# Patient Record
Sex: FEMALE | Race: WHITE | NOT HISPANIC OR LATINO | Employment: FULL TIME
[De-identification: names, ages, dates, MRNs, and addresses within clinical notes are randomized per-mention and may not be internally consistent; named-entity substitution may affect disease eponyms.]

---

## 2018-02-04 ENCOUNTER — HEALTH MAINTENANCE LETTER (OUTPATIENT)
Age: 63
End: 2018-02-04

## 2018-02-05 ENCOUNTER — OFFICE VISIT (OUTPATIENT)
Dept: FAMILY MEDICINE | Facility: CLINIC | Age: 63
End: 2018-02-05
Payer: COMMERCIAL

## 2018-02-05 VITALS
HEART RATE: 82 BPM | WEIGHT: 293 LBS | BODY MASS INDEX: 45.99 KG/M2 | OXYGEN SATURATION: 94 % | HEIGHT: 67 IN | TEMPERATURE: 98.4 F | SYSTOLIC BLOOD PRESSURE: 132 MMHG | DIASTOLIC BLOOD PRESSURE: 80 MMHG

## 2018-02-05 DIAGNOSIS — R01.1 UNDIAGNOSED CARDIAC MURMURS: ICD-10-CM

## 2018-02-05 DIAGNOSIS — Z13.6 CARDIOVASCULAR SCREENING; LDL GOAL LESS THAN 130: ICD-10-CM

## 2018-02-05 DIAGNOSIS — Z11.59 NEED FOR HEPATITIS C SCREENING TEST: ICD-10-CM

## 2018-02-05 DIAGNOSIS — Z23 NEED FOR TDAP VACCINATION: ICD-10-CM

## 2018-02-05 DIAGNOSIS — E66.01 MORBID OBESITY (H): ICD-10-CM

## 2018-02-05 DIAGNOSIS — R60.0 PERIPHERAL EDEMA: Primary | ICD-10-CM

## 2018-02-05 LAB — HBA1C MFR BLD: 5.7 % (ref 4.3–6)

## 2018-02-05 PROCEDURE — 90471 IMMUNIZATION ADMIN: CPT | Performed by: FAMILY MEDICINE

## 2018-02-05 PROCEDURE — 36415 COLL VENOUS BLD VENIPUNCTURE: CPT | Performed by: FAMILY MEDICINE

## 2018-02-05 PROCEDURE — 99203 OFFICE O/P NEW LOW 30 MIN: CPT | Mod: 25 | Performed by: FAMILY MEDICINE

## 2018-02-05 PROCEDURE — 90715 TDAP VACCINE 7 YRS/> IM: CPT | Performed by: FAMILY MEDICINE

## 2018-02-05 PROCEDURE — 80061 LIPID PANEL: CPT | Performed by: FAMILY MEDICINE

## 2018-02-05 PROCEDURE — 82947 ASSAY GLUCOSE BLOOD QUANT: CPT | Performed by: FAMILY MEDICINE

## 2018-02-05 PROCEDURE — 83036 HEMOGLOBIN GLYCOSYLATED A1C: CPT | Performed by: FAMILY MEDICINE

## 2018-02-05 PROCEDURE — 86803 HEPATITIS C AB TEST: CPT | Performed by: FAMILY MEDICINE

## 2018-02-05 NOTE — PROGRESS NOTES
SUBJECTIVE:   Madyson Murray is a 62 year old female who presents to clinic today for the following health issues:    Concern - leg swelling   Onset: 2012 May     Description:   Bilateral leg swelling     Intensity: moderate    Progression of Symptoms:  constant    Accompanying Signs & Symptoms:  Redness     Precipitating factors:   Worsened by: Constant    Alleviating factors:  Improved by: nothing    Therapies Tried and outcome: nothing tried     Bilateral LE edema and erythema- Pt has had bilateral LE edema and erythema since her last total right knee replacement in 2012. She also had total knee replacement in 2010 and one in 2012. She notes her legs are constantly red. Since her last knee surgery, her legs have never been swollen like they currently are. Her bilateral edema is worse during the day, is on her feet all day long. Her swelling improves overnight, is not as severe in the mornings. No chest pain, SOB, recurrent cough. She thinks she had echocardiogram before her knee surgeries, never was told the results. 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 She also notes she has back pain, she throws her back out a lot.    Sleeplessness- Pt is morbidly obese; she works early mornings so she does not sleep all night long, takes naps in the afternoons due to being tired. She normally works from 4am-12pm.        Problem list and histories reviewed & adjusted, as indicated.  Additional history: as documented    Reviewed and updated as needed this visit by clinical staff  Tobacco  Allergies  Meds  Problems  Med Hx  Surg Hx  Fam Hx  Soc Hx        Reviewed and updated as needed this visit by Provider  Allergies  Meds  Problems       ROS:  Constitutional, HEENT, cardiovascular, pulmonary, gi and gu systems are negative, except as otherwise noted.    This document serves as a record of the services and decisions personally performed and made by Ga Ken MD. It  "was created on his behalf by Tyshawn Palacios, a trained medical scribe. The creation of this document is based on the provider's statements to the medical scribe.  Tyshawn Palacios 3:20 PM February 5, 2018    OBJECTIVE:     /80  Pulse 82  Temp 98.4  F (36.9  C) (Oral)  Ht 1.702 m (5' 7\")  Wt 133.8 kg (295 lb)  SpO2 94%  BMI 46.2 kg/m2  Body mass index is 46.2 kg/(m^2).  GENERAL: healthy, alert and no distress  NECK: no adenopathy, no asymmetry, masses, or scars and thyroid normal to palpation  RESP: lungs clear to auscultation - no rales, rhonchi or wheezes  CV: regular rate and rhythm, normal S1 S2, no S3 or S4, no click or rub, peripheral pulses strong. Mild peripheral edema noted bilaterally. Diminished erythema with straight leg raise bilaterally. 2/6 systolic blowing murmur heard best at the right upper sternal border; does not radiate to carotid arteries or axilla.  ABDOMEN: soft, nontender, no hepatosplenomegaly, no masses and bowel sounds normal  SKIN: diffuse dryness, especially in lower extremities    Diagnostic Test Results:  No results found for this or any previous visit (from the past 24 hour(s)).    ASSESSMENT/PLAN:     (R60.9) Peripheral edema  (primary encounter diagnosis)  Comment: Given that she is asymptomatic for CHF, DVT, or pulmonary HTN, discussed with pt that venous insufficiency is likely contributing to her bilateral leg edema. Discussed with pt that obesity is a contributing factor. Furthermore, since her edema is improved in the mornings, this also suggests venous insufficiency is most likely. Discussed with pt complications of uncontrolled venous insufficiency such as lichenification, or venous ulcers; however I reassured her that did not see any signs of either of these things. Recommended frequent use of compression stockings, as well as healthy diet and frequent exercise to promote weight loss which in turn will help improve her venous insufficiency. Lastly, recommended " moisturizing lotions for her skin.  Plan: Follow up if needed.    (E66.01) Morbid obesity (H)  Comment: Given she is morbidly obese, I discussed with pt that this is a risk factor for DAE. Furthermore, I discussed with pt that undiagnosed DAE may be source of her daytime drowsiness; thus, I recommended she see sleep specialist for further evaluation. Discussed with her the importance of getting a good night sleep in regards to having energy during the day which will hopefully motivate her to start exercising and ultimately help her lose weight.  Plan: SLEEP EVALUATION & MANAGEMENT REFERRAL - [facility]         [phone]  (Age 18 and up), Glucose,         Hemoglobin A1c        Follow up if needed.    (R01.1) Undiagnosed cardiac murmurs  Comment: Given her systolic heart murmur heard today upon examination, I strongly recommended we have her  do echocardiogram; she was in agreement with this plan. Will order an echocardiogram in [place] for further evaluation. If echocardiogram does not show any significant findings, can simply continue to monitor her heart murmur.  Plan: Echocardiogram Complete        Follow up based on results.    (Z13.6) CARDIOVASCULAR SCREENING; LDL GOAL LESS THAN 130  Comment: Pt fasting today, will check her lipids today for further evaluation.  Plan: Lipid panel reflex to direct LDL Fasting        Follow up based on labs.    (Z11.59) Need for hepatitis C screening test  Comment: Pt due for Hep C screening, discussed with pt that people born between 5028-3057 are at higher risk of Hep C, pt agreed to screen for Hep C today.  Plan: Hepatitis C Screen Reflex to HCV RNA Quant and         Genotype        Follow up based on labs.    (Z23) Need for Tdap vaccination  Comment: Pt due for TDAP; she agreed to receive one today.  Plan: TDAP VACCINE (ADACEL)        Follow up if needed.    Pt is overdue for physical; she still has her uterus and is due for Pap.  Advised pt that we normally do Pap until 65. Pt was fasting today and agreed to do blood work while she was here. She will schedule an appointment to do her next physical within the next month.    The information in this document, created by the medical scribe for me, accurately reflects the services I personally performed and the decisions made by me. I have reviewed and approved this document for accuracy prior to leaving the patient care area.  February 5, 2018 3:20 PM    Ga Ken Jr, MD  [facility]

## 2018-02-05 NOTE — NURSING NOTE
"Chief Complaint   Patient presents with     Leg Swelling       Initial /80  Pulse 82  Temp 98.4  F (36.9  C) (Oral)  Ht 5' 7\" (1.702 m)  Wt 295 lb (133.8 kg)  SpO2 94%  BMI 46.2 kg/m2 Estimated body mass index is 46.2 kg/(m^2) as calculated from the following:    Height as of this encounter: 5' 7\" (1.702 m).    Weight as of this encounter: 295 lb (133.8 kg).  Medication Reconciliation: complete  "

## 2018-02-05 NOTE — MR AVS SNAPSHOT
After Visit Summary   2/5/2018    Madyson Murray    MRN: [id number]           Patient Information     Date Of Birth          1955        Visit Information        Provider Department      2/5/2018 2:40 PM Ga Ken Jr., MD [facility] [place]        Today's Diagnoses     Peripheral edema    -  1    Morbid obesity (H)        Undiagnosed cardiac murmurs        CARDIOVASCULAR SCREENING; LDL GOAL LESS THAN 130        Need for hepatitis C screening test        Need for Tdap vaccination           Follow-ups after your visit        Additional Services     SLEEP EVALUATION & MANAGEMENT REFERRAL - [facility] [phone]  (Age 18 and up)       Please be aware that coverage of these services is subject to the terms and limitations of your health insurance plan.  Call member services at your health plan with any benefit or coverage questions.      Please bring the following to your appointment:    >>   List of current medications   >>   This referral request   >>   Any documents/labs given to you for this referral                      Follow-up notes from your care team     Return in about 4 weeks (around 3/5/2018) for Preventative Visit.      Future tests that were ordered for you today     Open Future Orders        Priority Expected Expires Ordered    Echocardiogram Complete Routine  2/5/2019 2/5/2018    SLEEP EVALUATION & MANAGEMENT REFERRAL - [facility] [phone]  (Age 18 and up) Routine  2/5/2019 2/5/2018            Who to contact     If you have questions or need follow up information about today's clinic visit or your schedule please contact [facility] [place] directly at [phone].  Normal or non-critical lab and imaging results will be communicated to you by MyChart, letter or phone within 4 business days after the clinic has received the results. If you do not hear from us within 7 days, please contact the clinic  "through [employer] or phone. If you have a critical or abnormal lab result, we will notify you by phone as soon as possible.  Submit refill requests through [employer] or call your pharmacy and they will forward the refill request to us. Please allow 3 business days for your refill to be completed.          Additional Information About Your Visit        [employer] Information     [employer] lets you send messages to your doctor, view your test results, renew your prescriptions, schedule appointments and more. To sign up, go to [email or web address] . Click on \"Log in\" on the left side of the screen, which will take you to the Welcome page. Then click on \"Sign up Now\" on the right side of the page.     You will be asked to enter the access code listed below, as well as some personal information. Please follow the directions to create your username and password.     Your access code is: [email or web address]  Expires: 2018  3:56 PM     Your access code will  in 90 days. If you need help or a new code, please call your [place] clinic or [phone].        Care EveryWhere ID     This is your Care EveryWhere ID. This could be used by other organizations to access your [facility] medical records  [id number]        Your Vitals Were     Pulse Temperature Height Pulse Oximetry BMI (Body Mass Index)       82 98.4  F (36.9  C) (Oral) 5' 7\" (1.702 m) 94% 46.2 kg/m2        Blood Pressure from Last 3 Encounters:   18 132/80    Weight from Last 3 Encounters:   18 295 lb (133.8 kg)              We Performed the Following     Glucose     Hemoglobin A1c     Hepatitis C Screen Reflex to HCV RNA Quant and Genotype     Lipid panel reflex to direct LDL Fasting     TDAP VACCINE (ADACEL)        Primary Care Provider Office Phone # Fax #    Ga Ken Jr., [phone] [address]  [place] [address]        Equal Access to Services     DOMITILA YUN AH: wilda Guzman, " chase longoria barberhalina jeterjules chen ah. So [facility] [phone].    ATENCIÓN: Si habla faustino, tiene a israel disposición servicios gratuitos de asistencia lingüística. Llame al [phone].    We comply with applicable federal civil rights laws and [place] laws. We do not discriminate on the basis of race, color, national origin, age, disability, sex, sexual orientation, or gender identity.            Thank you!     Thank you for choosing [facility] [place]  for your care. Our goal is always to provide you with excellent care. Hearing back from our patients is one way we can continue to improve our services. Please take a few minutes to complete the written survey that you may receive in the mail after your visit with us. Thank you!             Your Updated Medication List - Protect others around you: Learn how to safely use, store and throw away your medicines at www.disposemymeds.org.      Notice  As of 2/5/2018  3:56 PM    You have not been prescribed any medications.

## 2018-02-06 LAB
CHOLEST SERPL-MCNC: 186 MG/DL
GLUCOSE SERPL-MCNC: 84 MG/DL (ref 70–99)
HCV AB SERPL QL IA: NONREACTIVE
HDLC SERPL-MCNC: 62 MG/DL
LDLC SERPL CALC-MCNC: 111 MG/DL
NONHDLC SERPL-MCNC: 124 MG/DL
TRIGL SERPL-MCNC: 66 MG/DL

## 2018-02-06 NOTE — PROGRESS NOTES
Ms. Murray,    -Cholesterol levels (LDL,HDL, Triglycerides) are normal. ADVISE: rechecking in 5 years.  -The 10-year ASCVD risk score (Linnette OPAL Jr, et al., 2013) is: 3.1%.  This is great Madyson.  Normal is less than 5%.    Values used to calculate the score:      Age: 62 years      Sex: Female      Is Non- : No      Diabetic: No      Tobacco smoker: No      Systolic Blood Pressure: 132 mmHg      Is BP treated: No      HDL Cholesterol: 62 mg/dL      Total Cholesterol: 186 mg/dL   -A1C (diabetic test) is normal and indicates that your blood sugar has been in a normal range the last 3 months.  -Glucose (diabetic screening test) is normal.  -Hepatitis C antibody screen test shows no signs of a previous hepatitis C infection.    If you have further questions about the interpretation of your labs, labtestsonline.org is a good website to check out for further information.    Please contact the clinic if you have additional questions.  Thank you.    Sincerely,    Ga Ken MD

## 2018-02-15 ENCOUNTER — OFFICE VISIT (OUTPATIENT)
Dept: SLEEP MEDICINE | Facility: CLINIC | Age: 63
End: 2018-02-15
Payer: COMMERCIAL

## 2018-02-15 VITALS
WEIGHT: 293 LBS | HEIGHT: 67 IN | BODY MASS INDEX: 45.99 KG/M2 | HEART RATE: 71 BPM | SYSTOLIC BLOOD PRESSURE: 140 MMHG | DIASTOLIC BLOOD PRESSURE: 87 MMHG | RESPIRATION RATE: 16 BRPM | OXYGEN SATURATION: 96 %

## 2018-02-15 DIAGNOSIS — G47.30 OBSERVED SLEEP APNEA: ICD-10-CM

## 2018-02-15 DIAGNOSIS — R06.83 SNORING: Primary | ICD-10-CM

## 2018-02-15 DIAGNOSIS — Z82.0 FAMILY HISTORY OF SLEEP APNEA: ICD-10-CM

## 2018-02-15 DIAGNOSIS — G47.26 SHIFT WORK SLEEP DISORDER: ICD-10-CM

## 2018-02-15 DIAGNOSIS — R03.0 ELEVATED BLOOD PRESSURE READING WITHOUT DIAGNOSIS OF HYPERTENSION: ICD-10-CM

## 2018-02-15 PROCEDURE — 99244 OFF/OP CNSLTJ NEW/EST MOD 40: CPT | Performed by: PHYSICIAN ASSISTANT

## 2018-02-15 RX ORDER — ZOLPIDEM TARTRATE 5 MG/1
TABLET ORAL
Qty: 1 TABLET | Refills: 0 | Status: SHIPPED | OUTPATIENT
Start: 2018-02-15 | End: 2018-04-16

## 2018-02-15 NOTE — PROGRESS NOTES
Sleep Consultation:    Date on this visit: 2/15/2018    Madyson Murray is sent by Ga Ken Jr, MD for a sleep consultation regarding morbid obesity.    Primary Physician: Ga Ken Jr.     Madyson Murray reports daytime sleepiness and middle of the night awakenings for over 10 years, worse in the last couple of years. Her medical history is significant for morbid obesity. She also has lower extremity edema and a heart murmur.    Madyson goes to sleep at 7:30-9:00 PM during the week. She is often asleep on the couch before that. She wakes up at 1:30-2:30 AM sometimes before her alarm. She has to get up early enough to take her dog for a walk before work.  She falls asleep in 5 minutes.  Madyson denies difficulty falling asleep.  She wakes up 2-4 times a night for 5-10 minutes before falling back to sleep.  Madyson wakes up to uncertain reasons.  She infrequently gets up for the restroom. On weekends, Madyson goes to sleep at 9:00 PM.  She wakes up at 4:00-5:00 AM without an alarm. She falls asleep in 5 minutes.  Patient gets an average of 5-6 hours of sleep per night.     Patient does watch TV in bed (mostly in the morning) and does not use electronics in bed, and read in bed. She sometimes worries about being able to get to sleep if she knows she has to get up at 1:30 AM.     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  She works early morning shifts.  She works 4 AM to noon. She works 3-11 AM on weekends. She works 6 days per week. She works at [employer]. She has been on this shift for a couple of years. Prior to that, she started at 6 AM. She thinks her sleep was probably a little better then. She is  and lives alone. Both of her siblings have DAE.    Madyson does snore but she does not know how loudly or how frequently. Patient does not have a regular bed partner. There is report of gasping and snorting.  She has had witnessed apneas in the past, when she was observed sleeping. This has been observed by a couple of  friends at different times when they room together. She may have had alcohol on some of those occasions. Patient sleeps on her back and side. She has occasional snort arousals and morning dry mouth, denies morning confusion and restless legs. She has [place] [employer] occasionally and her legs ache a lot. She wakes with a headache 1-2 times per week. It hurts at her temples and lasts a couple of hours. She takes ibuprofen almost every morning. She sometimes gets them in the day too. Madyson has occasional bruxism (no bite guard) and denies any sleep walking, sleep talking, dream enactment, sleep paralysis, cataplexy and hypnogogic/hypnopompic hallucinations.    Madyson has claustrophobia, denies difficulty breathing through her nose, reflux at night and heartburn.      Madyson has gained 25 pounds in 10 years.  Patient describes themself as a morning person.  She would prefer to go to sleep at 9:30 PM and wake up at 6:30 AM.  Patient's [place] Sleepiness score 17/24 consistent with excessive daytime sleepiness.      Madyson naps 6 times per week for 120-180 minutes, feels refreshed after naps. Her naps are usually around 4-6 PM. She sleeps more on the couch than in bed. She takes frequent inadvertant naps.  She admits dozing while driving. It is most likely to occur on longer drives. The most recent episode was 1 month(s) ago.  Patient was counseled on the importance of driving while alert, to pull over if drowsy, or nap before getting into the vehicle if sleepy.  She uses 1-2 cups/day (on days off) of coffee, 1 sodas/day. Last caffeine intake is usually with lunch.    Allergies:    Allergies   Allergen Reactions     Liquid Adhesive        Medications:    Current Outpatient Prescriptions   Medication Sig Dispense Refill     IBUPROFEN PO Take 200 mg by mouth daily 4 tablets daily         Problem List:  Patient Active Problem List    Diagnosis Date Noted     Morbid obesity (H) 02/05/2018     Priority: Medium     CARDIOVASCULAR  SCREENING; LDL GOAL LESS THAN 130 02/05/2018     Priority: Medium        Past Medical/Surgical History:  History reviewed. No pertinent past medical history.  Past Surgical History:   Procedure Laterality Date     ORTHOPEDIC SURGERY      2010 and 2012       Social History:  Social History     Social History     Marital status:      Spouse name: N/A     Number of children: N/A     Years of education: N/A     Occupational History     Not on file.     Social History Main Topics     Smoking status: Former Smoker     Packs/day: 1.00     Years: 15.00     Smokeless tobacco: Never Used      Comment: quit around age 32     Alcohol use Yes      Comment: 2-4 drinks couple times per week     Drug use: No     Sexual activity: Yes     Other Topics Concern     Not on file     Social History Narrative       Family History:  Family History   Problem Relation Age of Onset     Hypertension Mother      DIABETES Father      Hypertension Father      Parkinsonism Father      DIABETES Sister      Hypertension Sister      Sleep Apnea Sister      Sleep Apnea Brother        Review of Systems:  A complete review of systems reviewed by me is negative with the exeption of what has been mentioned in the history of present illness.  CONSTITUTIONAL: NEGATIVE for weight loss, fever, sweats or night sweats, drug allergies.  CONSTITUTIONAL:  POSITIVE for  weight gain and chills  EYES: NEGATIVE for blind spots, double vision.  EYES:  POSITIVE for changes in vision  ENT: NEGATIVE for ear pain, sore throat, sinus pain, bloody nose  ENT:  POSITIVE for  post-nasal drip and runny nose  CARDIAC: NEGATIVE for fast heartbeats or fluttering in chest, chest pain or pressure, breathlessness when lying flat.  CARDIAC:  POSITIVE for  swollen legs and swollen feet  NEUROLOGIC: NEGATIVE numbness in the arms or legs.  NEUROLOGIC:  POSITIVE for  headaches and weakness in the arms or legs  DERMATOLOGIC: NEGATIVE for rashes, new moles or change in  "mole(s)  PULMONARY: NEGATIVE SOB at rest, productive cough, coughing up blood, whistling when breathing.    PULMONARY:  POSITIVE for  SOB with activity, dry cough and wheezing   GASTROINTESTINAL: NEGATIVE for nausea or vomitting, loose or watery stools, fat or grease in stools, constipation, abdominal pain, bowel movements black in color or blood noted.  GENITOURINARY: NEGATIVE for pain during urination, blood in urine, urinating more frequently than usual, irregular menstrual periods.  MUSCULOSKELETAL:  POSITIVE for  muscle pain, bone or joint pain and swollen joints  ENDOCRINE: NEGATIVE for increased thirst or urination, diabetes.  LYMPHATIC: NEGATIVE for swollen lymph nodes, lumps or bumps in the breasts or nipple discharge.    Physical Examination:  Vitals: BP (!) 184/103  Pulse 71  Resp 16  Ht 1.702 m (5' 7\")  Wt 135.2 kg (298 lb)  SpO2 96%  BMI 46.67 kg/m2  Neck Circumference: 41 cm.     [place] Total Score 2/15/2018   Total score - [place] 17       GENERAL APPEARANCE: healthy, alert, no distress and cooperative  EYES: Eyes grossly normal to inspection, PERRL, conjunctivae and sclerae normal and lids and lashes normal  HENT: nose and mouth without ulcers or lesions, oropharynx small and crowded, tongue base enlarged, tonsillar hypertrophy and upper dentures. Several lower teeth are broken  NECK: no adenopathy, no asymmetry, masses, or scars, thyroid normal to palpation and trachea midline and normal to palpation  RESP: lungs clear to auscultation - no rales, rhonchi or wheezes  CV: regular rates and rhythm, normal S1 S2, no S3 or S4, no irregular beats and grade 2/6 systolic murmur heard best over the upper right sternal border  LYMPHATICS: normal ant/post cervical and supraclavicular nodes  MS: extremities normal- no gross deformities noted and pitting 2+ lower extremity edema bilaterally  NEURO: Normal strength and tone, mentation intact, speech normal and cranial nerves 2-12 intact  Mallampati Class: " IV.  Tonsillar Stage: 3  extending beyond pillars.    Impression/Plan:    (R06.83) Snoring  (primary encounter diagnosis), (G47.30) Observed sleep apnea, (Z68.42) BMI 45.0-49.9, adult (H), (R03.0) Elevated blood pressure reading without diagnosis of hypertension, (Z82.0) Family history of sleep apnea  Comment: Madyson has a high risk for sleep apnea. She snores, has observed apnea, daytime sleepiness (ESS 17/24), BMI 46, age >50 (60), neck >40 cm (41 cm). She also had high blood pressure readings today, but is not diagnosed with HTN. Her sleepiness is likely confounded by her irregular work schedule. She has morning headaches 1-2 times per week. She has some risk for hypoventilation given her BMI of 46. Her SpO2 was normal today at 96%. It was 94% at her office visit with her primary. She will be having an ECHO for her murmur next week.  Plan: Comprehensive Sleep Study        Split night PSG with CPAP/BiPAP titration if indicated. 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 She often falls asleep unintentionally after work. She sometimes has trouble getting to sleep on time if she naps close to bedtime.   Plan: We talked about scheduling a nap right when she gets home and setting an alarm to make sure naps are not too long (no more than 2 hours). She was advised to make sure that light is completely blocked from her windows. Consider sunglasses in the evening before bedtime. We will work on this more after her study if needed.      Literature provided regarding sleep apnea.      She will follow up with me in approximately two weeks after her sleep study has been competed to review the results and discuss plan of care.       Polysomnography reviewed.  Obstructive sleep apnea reviewed.  Complications of untreated sleep apnea were reviewed.  45 minutes was spent during this visit, over 50% in counseling and coordination of  care.   Bennett Goltz, PA-C    CC: Ga Ken Jr, MD

## 2018-02-15 NOTE — MR AVS SNAPSHOT
"              After Visit Summary   2/15/2018    Madyson Murray    MRN: [id number]           Patient Information     Date Of Birth          1955        Visit Information        Provider Department      2/15/2018 2:00 PM Goltz, Bennett Ezra, PA-C [facility]        Today's Diagnoses     Snoring    -  1    Observed sleep apnea        BMI 45.0-49.9, adult (H)        Elevated blood pressure reading without diagnosis of hypertension        Family history of sleep apnea        Shift work sleep disorder          Care Instructions    MY TREATMENT INFORMATION FOR SLEEP APNEA-  Madyson Murray    DOCTOR : Bennett Ezra Goltz, PA-C  SLEEP CENTER : [facility]     MY CONTACT NUMBER: [phone]    Am I having a sleep study at a sleep center?  Make sure you have an appointment for the study before you leave!    Am I having a home sleep study?  Watch this video:  https://www.Granicus.com/watch?v=CteI_GhyP9g&[email or web address]    Frequently asked questions:  1. What is Obstructive Sleep Apnea (DAE)? DAE is the most common type of sleep apnea. Apnea means, \"without breath.\"  Apnea is most often caused by narrowing or collapse of the upper airway as muscles relax during sleep.   Almost everyone has occasional apneas. Most people with sleep apnea have had brief interruptions at night frequently for many years.  The severity of sleep apnea is related to how frequent and severe the events are.   2. What are the consequences of DAE? Symptoms include: feeling sleepy during the day, snoring loudly, gasping or stopping of breathing, trouble sleeping, and occasionally morning headaches or heartburn at night.  Sleepiness can be serious and even increase the risk of falling asleep while driving. Other health consequences may include development of high blood pressure and other cardiovascular disease in persons who are susceptible. Untreated DAE can contribute to heart disease, stroke and diabetes.   3. What are " the treatment options? In most situations, sleep apnea is a lifelong disease that must be managed with daily therapy. Medications are not effective for sleep apnea and surgery is generally not considered until other therapies have been tried. Your treatment is your choice . Continuous Positive Airway (CPAP) works right away and is the therapy that is effective in nearly everyone. An oral device to hold your jaw forward is usually the next most reliable option. Other options include postioning devices (to keep you off your back), weight loss, and surgery including a tongue pacing device. There is more detail about some of these options below.    Important tips for using CPAP and similar devices   Know your equipment:  CPAP is continuous positive airway pressure that prevents obstructive sleep apnea by keeping the throat from collapsing while you are sleeping. In most cases, the device is  smart  and can slowly self-adjusts if your throat collapses and keeps a record every day of how well you are treated-this information is available to you and your care team.  BPAP is bilevel positive airway pressure that keeps your throat open and also assists each breath with a pressure boost to maintain adequate breathing.  Special kinds of BPAP are used in patients who have inadequate breathing from lung or heart disease. In most cases, the device is  smart  and can slowly self-adjusts to assist breathing. Like CPAP, the device keeps a record of how well you are treated.  Your mask is your connection to the device. You get to choose what feels most comfortable and the staff will help to make sure if fits. Here: are some examples of the different masks that are available:       Key points to remember on your journey with sleep apnea:  1. Sleep study.  PAP devices often need to be adjusted during a sleep study to show that they are effective and adjusted right.  2. Good tips to remember: Try wearing just the mask during a quiet  time during the day so your body adapts to wearing it. A humidifier is recommended for comfort in most cases to prevent drying of your nose and throat. Allergy medication from your provider may help you if you are having nasal congestion.  3. Getting settled-in. It takes more than one night for most of us to get used to wearing a mask. Try wearing just the mask during a quiet time during the day so your body adapts to wearing it. A humidifier is recommended for comfort in most cases. Our team will work with you carefully on the first day and will be in contact within 4 days and again at 2 and 4 weeks for advice and remote device adjustments. Your therapy is evaluated by the device each day.   4. Use it every night. The more you are able to sleep naturally for 7-8 hours, the more likely you will have good sleep and to prevent health risks or symptoms from sleep apnea. Even if you use it 4 hours it helps. Occasionally all of us are unable to use a medical therapy, in sleep apnea, it is not dangerous to miss one night.   5. Communicate. Call our skilled team on the number provided on the first day if your visit for problems that make it difficult to wear the device. Over 2 out of 3 patients can learn to wear the device long-term with help from our team. Remember to call our team or your sleep providers if you are unable to wear the device as we may have other solutions for those who cannot adapt to mask CPAP therapy. It is recommended that you sleep your sleep provider within the first 3 months and yearly after that if you are not having problems.   Take care of your equipment. Make sure you clean your mask and tubing using directions every day and that your filter and mask are replaced as recommended or if they are not working.     BESIDES CPAP, WHAT OTHER THERAPIES ARE THERE?    Positioning Device  Positioning devices are generally used when sleep apnea is mild and only occurs on your back.This example shows a pillow  that straps around the waist. It may be appropriate for those whose sleep study shows milder sleep apnea that occurs primarily when lying flat on one's back. Preliminary studies have shown benefit but effectiveness at home may need to be verified by a home sleep test. These devices are generally not covered by medical insurance.  Examples of devices that maintain sleeping on the back to prevent snoring and mild sleep apnea.    Belt type body positioner  Http://Prescreen.Ugenie/    Electronic reminder  Http://nightshifttherapy.com/  Http://www.FINXI.Ugenie.au/    Oral Appliance  What is oral appliance therapy?  An oral appliance device fits on your teeth at night like a retainer used after having braces. The device is made by a specialized dentist and requires several visits over 1-2 months before a manufactured device is made to fit your teeth and is adjusted to prevent your sleep apnea. Once an oral device is working properly, snoring should be improved. A home sleep test may be recommended at that time if to determine whether the sleep apnea is adequately treated.       Some things to remember:  -Oral devices are often, but not always, covered by your medical insurance. Be sure to check with your insurance provider.   -If you are referred for oral therapy, you will be given a list of specialized dentists to consider or you may choose to visit the Web site of the American Academy of Dental Sleep Medicine  -Oral devices are less likely to work if you have severe sleep apnea or are extremely overweight.     More detailed information  An oral appliance is a small acrylic device that fits over the upper and lower teeth  (similar to a retainer or a mouth guard). This device slightly moves jaw forward, which moves the base of the tongue forward, opens the airway, improves breathing for effective treat snoring and obstructive sleep apnea in perhaps 7 out of 10 people .  The best working devices are custom-made by a dental  device  after a mold is made of the teeth 1, 2, 3.  When is an oral appliance indicated?  Oral appliance therapy is recommended as a first-line treatment for patients with primary snoring, mild sleep apnea, and for patients with moderate sleep apnea who prefer appliance therapy to use of CPAP4, 5. Severity of sleep apnea is determined by sleep testing and is based on the number of respiratory events per hour of sleep.   How successful is oral appliance therapy?  The success rate of oral appliance therapy in patients with mild sleep apnea is 75-80% while in patients with moderate sleep apnea it is 50-70%. The chance of success in patients with severe sleep apnea is 40-50%. The research also shows that oral appliances have a beneficial effect on the cardiovascular health of DAE patients at the same magnitude as CPAP therapy7.  Oral appliances should be a second-line treatment in cases of severe sleep apnea, but if not completely successful then a combination therapy utilizing CPAP plus oral appliance therapy may be effective. Oral appliances tend to be effective in a broad range of patients although studies show that the patients who have the highest success are females, younger patients, those with milder disease, and less severe obesity. 3, 6.   Finding a dentist that practices dental sleep medicine  Specific training is available through the American Academy of Dental Sleep Medicine for dentists interested in working in the field of sleep. To find a dentist who is educated in the field of sleep and the use of oral appliances, near you, visit the Web site of the American Academy of Dental Sleep Medicine.    References  1. Brooklyn et al. Objectively measured vs self-reported compliance during oral appliance therapy for sleep-disordered breathing. Chest 2013; 144(5): 8835-8990.  2. Alex et al. Objective measurement of compliance during oral appliance therapy for sleep-disordered breathing.  Thorax 2013; 68(1): 91-96.  3. Luke et al. Mandibular advancement devices in 620 men and women with DAE and snoring: tolerability and predictors of treatment success. Chest 2004; 125: 3828-0574.  4. Mckinley et al. Oral appliances for snoring and ADE: a review. Sleep 2006; 29: 244-262.  5. Fatimah et al. Oral appliance treatment for DAE: an update. J Clin Sleep Med 2014; 10(2): 215-227.  6. Bere et al. Predictors of OSAH treatment outcome. J Dent Res 2007; 86: 0970-6915.  Weight Loss:    Weight loss is a long-term strategy that may improve sleep apnea in some patients.    Weight management is a personal decision and the decision should be based on your interest and the potential benefits.  If you are interested in exploring weight loss strategies, the following discussion covers the impact on weight loss on sleep apnea and the approaches that may be successful.    Being overweight does not necessarily mean you will have health consequences.  Those who have BMI over 35 or over 27 with existing medical conditions carries greater risk.   Weight loss decreases severity of sleep apnea in most people with obesity. For those with mild obesity who have developed snoring with weight gain, even 15-30 pound weight loss can improve and occasionally eliminate sleep apnea.  Structured and life-long dietary and health habits are necessary to lose weight and keep healthier weight levels.     Though there may be significant health benefits from weight loss, long-term weight loss is very difficult to achieve- studies show success with dietary management in less than 10% of people. In addition, substantial weight loss may require years of dietary control and may be difficult if patients have severe obesity. In these cases, surgical management may be considered.  Finally, older individuals who have tolerated obesity without health complications may be less likely to benefit from weight loss strategies.      Your BMI is  Body mass index is 46.67 kg/(m^2).  Weight management is a personal decision.  If you are interested in exploring weight loss strategies, the following discussion covers the approaches that may be successful. Body mass index (BMI) is one way to tell whether you are at a healthy weight, overweight, or obese. It measures your weight in relation to your height.  A BMI of 18.5 to 24.9 is in the healthy range. A person with a BMI of 25 to 29.9 is considered overweight, and someone with a BMI of 30 or greater is considered obese. More than two-thirds of American adults are considered overweight or obese.  Being overweight or obese increases the risk for further weight gain. Excess weight may lead to heart disease and diabetes.  Creating and following plans for healthy eating and physical activity may help you improve your health.  Weight control is part of healthy lifestyle and includes exercise, emotional health, and healthy eating habits. Careful eating habits lifelong are the mainstay of weight control. Though there are significant health benefits from weight loss, long-term weight loss with diet alone may be very difficult to achieve- studies show long-term success with dietary management in less than 10% of people. Attaining a healthy weight may be especially difficult to achieve in those with severe obesity. In some cases, medications, devices and surgical management might be considered.  What can you do?  If you are overweight or obese and are interested in methods for weight loss, you should discuss this with your provider.     Consider reducing daily calorie intake by 500 calories.     Keep a food journal.     Avoiding skipping meals, consider cutting portions instead.    Diet combined with exercise helps maintain muscle while optimizing fat loss. Strength training is particularly important for building and maintaining muscle mass. Exercise helps reduce stress, increase energy, and improves fitness. Increasing  exercise without diet control, however, may not burn enough calories to loose weight.       Start walking three days a week 10-20 minutes at a time    Work towards walking thirty minutes five days a week     Eventually, increase the speed of your walking for 1-2 minutes at time    In addition, we recommend that you review healthy lifestyles and methods for weight loss available through the National Institutes of Health patient information sites:  http://win.niddk.nih.gov/publications/index.htm    And look into health and wellness programs that may be available through your health insurance provider, employer, local community center, or ally club.    Weight management plan: Patient was referred to their PCP to discuss a diet and exercise plan.    Surgery:    Surgery for obstructive sleep apnea is considered generally only when other therapies fail to work. Surgery may be discussed with you if you are having a difficult time tolerating CPAP and or when there is an abnormal structure that requires surgical correction.  Nose and throat surgeries often enlarge the airway to prevent collapse.  Most of these surgeries create pain for 1-2 weeks and up to half of the most common surgeries are not effective throughout life.  You should carefully discuss the benefits and drawbacks to surgery with your sleep provider and surgeon to determine if it is the best solution for you.   More information  Surgery for DAE is directed at areas that are responsible for narrowing or complete obstruction of the airway during sleep.  There are a wide range of procedures available to enlarge and/or stabilize the airway to prevent blockage of breathing in the three major areas where it can occur: the palate, tongue, and nasal regions.  Successful surgical treatment depends on the accurate identification of the factors responsible for obstructive sleep apnea in each person.  A personalized approach is required because there is no single  treatment that works well for everyone.  Because of anatomic variation, consultation with an examination by a sleep surgeon is a critical first step in determining what surgical options are best for each patient.  In some cases, examination during sedation may be recommended in order to guide the selection of procedures.  Patients will be counseled about risks and benefits as well as the typical recovery course after surgery. Surgery is typically not a cure for a person s DAE.  However, surgery will often significantly improve one s DAE severity (termed  success rate ).  Even in the absence of a cure, surgery will decrease the cardiovascular risk associated with OSA7; improve overall quality of life8 (sleepiness, functionality, sleep quality, etc).  Palate Procedures:  Patients with DAE often have narrowing of their airway in the region of their tonsils and uvula.  The goals of palate procedures are to widen the airway in this region as well as to help the tissues resist collapse.  Modern palate procedure techniques focus on tissue conservation and soft tissue rearrangement, rather than tissue removal.  Often the uvula is preserved in this procedure. Residual sleep apnea is common in patient after pharyngoplasty with an average reduction in sleep apnea events of 33%2.    Tongue Procedures:  ExamWhile patients are awake, the muscles that surround the throat are active and keep this region open for breathing. These muscles relax during sleep, allowing the tongue and other structures to collapse and block breathing.  There are several different tongue procedures available.  Selection of a tongue base procedure depends on characteristics seen on physical exam.  Generally, procedures are aimed at removing bulky tissues in this area or preventing the back of the tongue from falling back during sleep.  Success rates for tongue surgery range from 50-62%3.  Hypoglossal Nerve Stimulation:  Hypoglossal nerve stimulation has  recently received approval from the [place] Food and Drug Administration for the treatment of obstructive sleep apnea.  This is based on research showing that the system was safe and effective in treating sleep apnea6.  Results showed that the median AHI score decreased 68%, from 29.3 to 9.0. This therapy uses an implant system that senses breathing patterns and delivers mild stimulation to airway muscles, which keeps the airway open during sleep.  The system consists of three fully implanted components: a small generator (similar in size to a pacemaker), a breathing sensor, and a stimulation lead.  Using a small handheld remote, a patient turns the therapy on before bed and off upon awakening.    Candidates for this device must be greater than 22 years of age, have moderate to severe DAE (AHI between 20-65), BMI less than 32, have tried CPAP/oral appliance without success, and have appropriate upper airway anatomy (determined by a sleep endoscopy performed by Dr. Max).  Hypoglossal Nerve Stimulation Pathway:    The sleep surgeon s office will work with the patient through the insurance prior-authorization process (including communications and appeals).    Nasal Procedures:  Nasal obstruction can interfere with nasal breathing during the day and night.  Studies have shown that relief of nasal obstruction can improve the ability of some patients to tolerate positive airway pressure therapy for obstructive sleep apnea1.  Treatment options include medications such as nasal saline, topical corticosteroid and antihistamine sprays, and oral medications such as antihistamines or decongestants. Non-surgical treatments can include external nasal dilators for selected patients. If these are not successful by themselves, surgery can improve the nasal airway either alone or in combination with these other options.  Combination Procedures:  Combination of surgical procedures and other treatments may be recommended,  particularly if patients have more than one area of narrowing or persistent positional disease.  The success rate of combination surgery ranges from 66-80%2,3.    References  1. Wayne CONTRERAS. The Role of the Nose in Snoring and Obstructive Sleep Apnoea: An Update.  Eur Arch Otorhinolaryngol. 2011; 268: 1365-73.  2.  Boubacar SM; Reji JA; Homero JR; Pallanch JF; Clarisa MB; Norma SG; Clive WOOD. Surgical modifications of the upper airway for obstructive sleep apnea in adults: a systematic review and meta-analysis. SLEEP 2010;33(10):5686-9994. Len THOMAS. Hypopharyngeal surgery in obstructive sleep apnea: an evidence-based medicine review.  Arch Otolaryngol Head Neck Surg. 2006 Feb;132(2):206-13.  3. Harvey YH1, Alvin Y, Vinnie ADDY. The efficacy of anatomically based multilevel surgery for obstructive sleep apnea. Otolaryngol Head Neck Surg. 2003 Oct;129(4):327-35.  4. Lne THOMAS, Goldberg A. Hypopharyngeal Surgery in Obstructive Sleep Apnea: An Evidence-Based Medicine Review. Arch Otolaryngol Head Neck Surg. 2006 Feb;132(2):206-13.  5. Vipul PJ et al. Upper-Airway Stimulation for Obstructive Sleep Apnea.  N Engl J Med. 2014 Jan 9;370(2):139-49.  6. April Y et al. Increased Incidence of Cardiovascular Disease in Middle-aged Men with Obstructive Sleep Apnea. Am J Respir Crit Care Med; 2002 166: 159-165  Morris EM et al. Studying Life Effects and Effectiveness of Palatopharyngoplasty (SLEEP) study: Subjective Outcomes of Isolated Uvulopalatopharyngoplasty. Otolaryngol Head Neck Surg. 2011; 144: 623-631.          Follow-ups after your visit        Follow-up notes from your care team     Return in about 2 weeks (around 3/1/2018) for Sleep Study Review.      Your next 10 appointments already scheduled     Feb 22, 2018  3:00 PM CST   Ech Complete with ECECH1   [facility] ([address] [address]   [phone]           1. Please bring or wear a  comfortable two-piece outfit. 2. You may eat, drink and take your normal medicines. 3. For any questions that cannot be answered, please contact the ordering physician            Mar 05, 2018  2:20 PM CST   SHORT with Ga Ken Jr., MD   [facility]    [address] [place] [address] [phone]            Mar 27, 2018  8:30 PM CDT   Psg Split W/Tcm with BED 2 SH SLEEP   [facility] ([facility])    [address]  [facility] [address] [phone]            Apr 16, 2018  3:30 PM CDT   Return Sleep Patient with Bennett Ezra Goltz, PA-C   [facility] ([facility])    [address] [address] [phone]              Future tests that were ordered for you today     Open Future Orders        Priority Expected Expires Ordered    Comprehensive Sleep Study Routine  8/14/2018 2/15/2018            Who to contact     If you have questions or need follow up information about today's clinic visit or your schedule please contact [facility] directly at [phone].  Normal or non-critical lab and imaging results will be communicated to you by Onyvaxhart, letter or phone within 4 business days after the clinic has received the results. If you do not hear from us within 7 days, please contact the clinic through Onyvaxhart or phone. If you have a critical or abnormal lab result, we will notify you by phone as soon as possible.  Submit refill requests through CRAZE or call your pharmacy and they will forward the refill request to us. Please allow 3 business days for your refill to be completed.          Additional Information About Your Visit        CRAZE Information     CRAZE gives you secure access to your electronic health record. If you see a primary care provider, you can also send messages to your care team and make  "appointments. If you have questions, please call your primary care clinic.  If you do not have a primary care provider, please call [phone] and they will assist you.        Care EveryWhere ID     This is your Care EveryWhere ID. This could be used by other organizations to access your [facility] medical records  [id number]        Your Vitals Were     Pulse Respirations Height Pulse Oximetry BMI (Body Mass Index)       71 16 1.702 m (5' 7\") 96% 46.67 kg/m2        Blood Pressure from Last 3 Encounters:   02/15/18 140/87   02/05/18 132/80    Weight from Last 3 Encounters:   02/15/18 135.2 kg (298 lb)   02/05/18 133.8 kg (295 lb)                 Today's Medication Changes          These changes are accurate as of 2/15/18  2:54 PM.  If you have any questions, ask your nurse or doctor.               Start taking these medicines.        Dose/Directions    zolpidem 5 MG tablet   Commonly known as:  AMBIEN   Started by:  Goltz, Bennett Ezra, PA-C        Take tablet by mouth 15 minutes prior to sleep, for Sleep Study   Quantity:  1 tablet   Refills:  0            Where to get your medicines      Some of these will need a paper prescription and others can be bought over the counter.  Ask your nurse if you have questions.     Bring a paper prescription for each of these medications     zolpidem 5 MG tablet                Primary Care Provider Office Phone # Fax #    Ga Ken Jr., [phone] [id number]  [place] [address]        Equal Access to Services     DOMITILA YUN AH: Hadii gil ku hadasho Soomaali, waaxda luqadaha, qaybta kaalmada coronaegyada, chase chen . So [facility] [phone].    ATENCIÓN: Si habla español, tiene a israel disposición servicios gratuitos de asistencia lingüística. Llame al [phone].    We comply with applicable federal civil rights laws and [place] laws. We do not discriminate on the basis of race, color, national origin, age, " disability, sex, sexual orientation, or gender identity.            Thank you!     Thank you for choosing [facility] [place]  for your care. Our goal is always to provide you with excellent care. Hearing back from our patients is one way we can continue to improve our services. Please take a few minutes to complete the written survey that you may receive in the mail after your visit with us. Thank you!             Your Updated Medication List - Protect others around you: Learn how to safely use, store and throw away your medicines at www.disposemymeds.org.          This list is accurate as of 2/15/18  2:54 PM.  Always use your most recent med list.                   Brand Name Dispense Instructions for use Diagnosis    IBUPROFEN PO      Take 200 mg by mouth daily 4 tablets daily        zolpidem 5 MG tablet    AMBIEN    1 tablet    Take tablet by mouth 15 minutes prior to sleep, for Sleep Study

## 2018-02-15 NOTE — PATIENT INSTRUCTIONS
"MY TREATMENT INFORMATION FOR SLEEP APNEA-  Madyson Murray    DOCTOR : Bennett Ezra Goltz, PA-C  SLEEP CENTER : [facility]     MY CONTACT NUMBER: [phone]    Am I having a sleep study at a sleep center?  Make sure you have an appointment for the study before you leave!    Am I having a home sleep study?  Watch this video:  https://www.[employer].com/watch?v=CteI_GhyP9g&list=[email or web address]_[email or web address]    Frequently asked questions:  1. What is Obstructive Sleep Apnea (DAE)? DAE is the most common type of sleep apnea. Apnea means, \"without breath.\"  Apnea is most often caused by narrowing or collapse of the upper airway as muscles relax during sleep.   Almost everyone has occasional apneas. Most people with sleep apnea have had brief interruptions at night frequently for many years.  The severity of sleep apnea is related to how frequent and severe the events are.   2. What are the consequences of DAE? Symptoms include: feeling sleepy during the day, snoring loudly, gasping or stopping of breathing, trouble sleeping, and occasionally morning headaches or heartburn at night.  Sleepiness can be serious and even increase the risk of falling asleep while driving. Other health consequences may include development of high blood pressure and other cardiovascular disease in persons who are susceptible. Untreated DAE can contribute to heart disease, stroke and diabetes.   3. What are the treatment options? In most situations, sleep apnea is a lifelong disease that must be managed with daily therapy. Medications are not effective for sleep apnea and surgery is generally not considered until other therapies have been tried. Your treatment is your choice . Continuous Positive Airway (CPAP) works right away and is the therapy that is effective in nearly everyone. An oral device to hold your jaw forward is usually the next most reliable option. Other options include postioning devices (to keep you off your back), weight loss, and " surgery including a tongue pacing device. There is more detail about some of these options below.    Important tips for using CPAP and similar devices   Know your equipment:  CPAP is continuous positive airway pressure that prevents obstructive sleep apnea by keeping the throat from collapsing while you are sleeping. In most cases, the device is  smart  and can slowly self-adjusts if your throat collapses and keeps a record every day of how well you are treated-this information is available to you and your care team.  BPAP is bilevel positive airway pressure that keeps your throat open and also assists each breath with a pressure boost to maintain adequate breathing.  Special kinds of BPAP are used in patients who have inadequate breathing from lung or heart disease. In most cases, the device is  smart  and can slowly self-adjusts to assist breathing. Like CPAP, the device keeps a record of how well you are treated.  Your mask is your connection to the device. You get to choose what feels most comfortable and the staff will help to make sure if fits. Here: are some examples of the different masks that are available:       Key points to remember on your journey with sleep apnea:  1. Sleep study.  PAP devices often need to be adjusted during a sleep study to show that they are effective and adjusted right.  2. Good tips to remember: Try wearing just the mask during a quiet time during the day so your body adapts to wearing it. A humidifier is recommended for comfort in most cases to prevent drying of your nose and throat. Allergy medication from your provider may help you if you are having nasal congestion.  3. Getting settled-in. It takes more than one night for most of us to get used to wearing a mask. Try wearing just the mask during a quiet time during the day so your body adapts to wearing it. A humidifier is recommended for comfort in most cases. Our team will work with you carefully on the first day and will be  in contact within 4 days and again at 2 and 4 weeks for advice and remote device adjustments. Your therapy is evaluated by the device each day.   4. Use it every night. The more you are able to sleep naturally for 7-8 hours, the more likely you will have good sleep and to prevent health risks or symptoms from sleep apnea. Even if you use it 4 hours it helps. Occasionally all of us are unable to use a medical therapy, in sleep apnea, it is not dangerous to miss one night.   5. Communicate. Call our skilled team on the number provided on the first day if your visit for problems that make it difficult to wear the device. Over 2 out of 3 patients can learn to wear the device long-term with help from our team. Remember to call our team or your sleep providers if you are unable to wear the device as we may have other solutions for those who cannot adapt to mask CPAP therapy. It is recommended that you sleep your sleep provider within the first 3 months and yearly after that if you are not having problems.   Take care of your equipment. Make sure you clean your mask and tubing using directions every day and that your filter and mask are replaced as recommended or if they are not working.     BESIDES CPAP, WHAT OTHER THERAPIES ARE THERE?    Positioning Device  Positioning devices are generally used when sleep apnea is mild and only occurs on your back.This example shows a pillow that straps around the waist. It may be appropriate for those whose sleep study shows milder sleep apnea that occurs primarily when lying flat on one's back. Preliminary studies have shown benefit but effectiveness at home may need to be verified by a home sleep test. These devices are generally not covered by medical insurance.  Examples of devices that maintain sleeping on the back to prevent snoring and mild sleep apnea.    Belt type body positioner  Http://Kodableosa.com/    Electronic reminder  Http://nightshifttherapy.com/   Http://www.PacketFront.Acquis[employer].au/    Oral Appliance  What is oral appliance therapy?  An oral appliance device fits on your teeth at night like a retainer used after having braces. The device is made by a specialized dentist and requires several visits over 1-2 months before a manufactured device is made to fit your teeth and is adjusted to prevent your sleep apnea. Once an oral device is working properly, snoring should be improved. A home sleep test may be recommended at that time if to determine whether the sleep apnea is adequately treated.       Some things to remember:  -Oral devices are often, but not always, covered by your medical insurance. Be sure to check with your insurance provider.   -If you are referred for oral therapy, you will be given a list of specialized dentists to consider or you may choose to visit the Web site of the American Academy of Dental Sleep Medicine  -Oral devices are less likely to work if you have severe sleep apnea or are extremely overweight.     More detailed information  An oral appliance is a small acrylic device that fits over the upper and lower teeth  (similar to a retainer or a mouth guard). This device slightly moves jaw forward, which moves the base of the tongue forward, opens the airway, improves breathing for effective treat snoring and obstructive sleep apnea in perhaps 7 out of 10 people .  The best working devices are custom-made by a dental device  after a mold is made of the teeth 1, 2, 3.  When is an oral appliance indicated?  Oral appliance therapy is recommended as a first-line treatment for patients with primary snoring, mild sleep apnea, and for patients with moderate sleep apnea who prefer appliance therapy to use of CPAP4, 5. Severity of sleep apnea is determined by sleep testing and is based on the number of respiratory events per hour of sleep.   How successful is oral appliance therapy?  The success rate of oral appliance therapy in patients with  mild sleep apnea is 75-80% while in patients with moderate sleep apnea it is 50-70%. The chance of success in patients with severe sleep apnea is 40-50%. The research also shows that oral appliances have a beneficial effect on the cardiovascular health of DAE patients at the same magnitude as CPAP therapy7.  Oral appliances should be a second-line treatment in cases of severe sleep apnea, but if not completely successful then a combination therapy utilizing CPAP plus oral appliance therapy may be effective. Oral appliances tend to be effective in a broad range of patients although studies show that the patients who have the highest success are females, younger patients, those with milder disease, and less severe obesity. 3, 6.   Finding a dentist that practices dental sleep medicine  Specific training is available through the American Academy of Dental Sleep Medicine for dentists interested in working in the field of sleep. To find a dentist who is educated in the field of sleep and the use of oral appliances, near you, visit the Web site of the American Academy of Dental Sleep Medicine.    References  1. Brooklyn et al. Objectively measured vs self-reported compliance during oral appliance therapy for sleep-disordered breathing. Chest 2013; 144(5): 7556-6759.  2. Alex, et al. Objective measurement of compliance during oral appliance therapy for sleep-disordered breathing. Thorax 2013; 68(1): 91-96.  3. Luke et al. Mandibular advancement devices in 620 men and women with DAE and snoring: tolerability and predictors of treatment success. Chest 2004; 125: 9949-3910.  4. Mckinley, et al. Oral appliances for snoring and DAE: a review. Sleep 2006; 29: 244-262.  5. Fatimah et al. Oral appliance treatment for DAE: an update. J Clin Sleep Med 2014; 10(2): 215-227.  6. Bere, et al. Predictors of OSAH treatment outcome. J Dent Res 2007; 86: 8347-6373.  Weight Loss:    Weight loss is a long-term strategy  that may improve sleep apnea in some patients.    Weight management is a personal decision and the decision should be based on your interest and the potential benefits.  If you are interested in exploring weight loss strategies, the following discussion covers the impact on weight loss on sleep apnea and the approaches that may be successful.    Being overweight does not necessarily mean you will have health consequences.  Those who have BMI over 35 or over 27 with existing medical conditions carries greater risk.   Weight loss decreases severity of sleep apnea in most people with obesity. For those with mild obesity who have developed snoring with weight gain, even 15-30 pound weight loss can improve and occasionally eliminate sleep apnea.  Structured and life-long dietary and health habits are necessary to lose weight and keep healthier weight levels.     Though there may be significant health benefits from weight loss, long-term weight loss is very difficult to achieve- studies show success with dietary management in less than 10% of people. In addition, substantial weight loss may require years of dietary control and may be difficult if patients have severe obesity. In these cases, surgical management may be considered.  Finally, older individuals who have tolerated obesity without health complications may be less likely to benefit from weight loss strategies.      Your BMI is Body mass index is 46.67 kg/(m^2).  Weight management is a personal decision.  If you are interested in exploring weight loss strategies, the following discussion covers the approaches that may be successful. Body mass index (BMI) is one way to tell whether you are at a healthy weight, overweight, or obese. It measures your weight in relation to your height.  A BMI of 18.5 to 24.9 is in the healthy range. A person with a BMI of 25 to 29.9 is considered overweight, and someone with a BMI of 30 or greater is considered obese. More than  two-thirds of American adults are considered overweight or obese.  Being overweight or obese increases the risk for further weight gain. Excess weight may lead to heart disease and diabetes.  Creating and following plans for healthy eating and physical activity may help you improve your health.  Weight control is part of healthy lifestyle and includes exercise, emotional health, and healthy eating habits. Careful eating habits lifelong are the mainstay of weight control. Though there are significant health benefits from weight loss, long-term weight loss with diet alone may be very difficult to achieve- studies show long-term success with dietary management in less than 10% of people. Attaining a healthy weight may be especially difficult to achieve in those with severe obesity. In some cases, medications, devices and surgical management might be considered.  What can you do?  If you are overweight or obese and are interested in methods for weight loss, you should discuss this with your provider.     Consider reducing daily calorie intake by 500 calories.     Keep a food journal.     Avoiding skipping meals, consider cutting portions instead.    Diet combined with exercise helps maintain muscle while optimizing fat loss. Strength training is particularly important for building and maintaining muscle mass. Exercise helps reduce stress, increase energy, and improves fitness. Increasing exercise without diet control, however, may not burn enough calories to loose weight.       Start walking three days a week 10-20 minutes at a time    Work towards walking thirty minutes five days a week     Eventually, increase the speed of your walking for 1-2 minutes at time    In addition, we recommend that you review healthy lifestyles and methods for weight loss available through the National Institutes of Health patient information sites:  http://win.niddk.nih.gov/publications/index.htm    And look into health and wellness  programs that may be available through your health insurance provider, employer, local community center, or ally club.    Weight management plan: Patient was referred to their PCP to discuss a diet and exercise plan.    Surgery:    Surgery for obstructive sleep apnea is considered generally only when other therapies fail to work. Surgery may be discussed with you if you are having a difficult time tolerating CPAP and or when there is an abnormal structure that requires surgical correction.  Nose and throat surgeries often enlarge the airway to prevent collapse.  Most of these surgeries create pain for 1-2 weeks and up to half of the most common surgeries are not effective throughout life.  You should carefully discuss the benefits and drawbacks to surgery with your sleep provider and surgeon to determine if it is the best solution for you.   More information  Surgery for DAE is directed at areas that are responsible for narrowing or complete obstruction of the airway during sleep.  There are a wide range of procedures available to enlarge and/or stabilize the airway to prevent blockage of breathing in the three major areas where it can occur: the palate, tongue, and nasal regions.  Successful surgical treatment depends on the accurate identification of the factors responsible for obstructive sleep apnea in each person.  A personalized approach is required because there is no single treatment that works well for everyone.  Because of anatomic variation, consultation with an examination by a sleep surgeon is a critical first step in determining what surgical options are best for each patient.  In some cases, examination during sedation may be recommended in order to guide the selection of procedures.  Patients will be counseled about risks and benefits as well as the typical recovery course after surgery. Surgery is typically not a cure for a person s DAE.  However, surgery will often significantly improve one s DAE  severity (termed  success rate ).  Even in the absence of a cure, surgery will decrease the cardiovascular risk associated with OSA7; improve overall quality of life8 (sleepiness, functionality, sleep quality, etc).  Palate Procedures:  Patients with DAE often have narrowing of their airway in the region of their tonsils and uvula.  The goals of palate procedures are to widen the airway in this region as well as to help the tissues resist collapse.  Modern palate procedure techniques focus on tissue conservation and soft tissue rearrangement, rather than tissue removal.  Often the uvula is preserved in this procedure. Residual sleep apnea is common in patient after pharyngoplasty with an average reduction in sleep apnea events of 33%2.    Tongue Procedures:  ExamWhile patients are awake, the muscles that surround the throat are active and keep this region open for breathing. These muscles relax during sleep, allowing the tongue and other structures to collapse and block breathing.  There are several different tongue procedures available.  Selection of a tongue base procedure depends on characteristics seen on physical exam.  Generally, procedures are aimed at removing bulky tissues in this area or preventing the back of the tongue from falling back during sleep.  Success rates for tongue surgery range from 50-62%3.  Hypoglossal Nerve Stimulation:  Hypoglossal nerve stimulation has recently received approval from the [place] Food and Drug Administration for the treatment of obstructive sleep apnea.  This is based on research showing that the system was safe and effective in treating sleep apnea6.  Results showed that the median AHI score decreased 68%, from 29.3 to 9.0. This therapy uses an implant system that senses breathing patterns and delivers mild stimulation to airway muscles, which keeps the airway open during sleep.  The system consists of three fully implanted components: a small generator (similar in  size to a pacemaker), a breathing sensor, and a stimulation lead.  Using a small handheld remote, a patient turns the therapy on before bed and off upon awakening.    Candidates for this device must be greater than 22 years of age, have moderate to severe DAE (AHI between 20-65), BMI less than 32, have tried CPAP/oral appliance without success, and have appropriate upper airway anatomy (determined by a sleep endoscopy performed by Dr. Max).  Hypoglossal Nerve Stimulation Pathway:    The sleep surgeon s office will work with the patient through the insurance prior-authorization process (including communications and appeals).    Nasal Procedures:  Nasal obstruction can interfere with nasal breathing during the day and night.  Studies have shown that relief of nasal obstruction can improve the ability of some patients to tolerate positive airway pressure therapy for obstructive sleep apnea1.  Treatment options include medications such as nasal saline, topical corticosteroid and antihistamine sprays, and oral medications such as antihistamines or decongestants. Non-surgical treatments can include external nasal dilators for selected patients. If these are not successful by themselves, surgery can improve the nasal airway either alone or in combination with these other options.  Combination Procedures:  Combination of surgical procedures and other treatments may be recommended, particularly if patients have more than one area of narrowing or persistent positional disease.  The success rate of combination surgery ranges from 66-80%2,3.    References  1. Wayne CONTRERAS. The Role of the Nose in Snoring and Obstructive Sleep Apnoea: An Update.  Eur Arch Otorhinolaryngol. 2011; 268: 1365-73.  2.  Boubacar SM; Reji JA; Homero JR; Pallanch JF; Clarisa LIMON; Norma ORTIZ; Clive WOOD. Surgical modifications of the upper airway for obstructive sleep apnea in adults: a systematic review and meta-analysis. SLEEP 2010;33(10):4190-7078.  Len THOMAS. Hypopharyngeal surgery in obstructive sleep apnea: an evidence-based medicine review.  Arch Otolaryngol Head Neck Surg. 2006 Feb;132(2):206-13.  3. Harvey YH1, Alvin Y, Vinnie ADDY. The efficacy of anatomically based multilevel surgery for obstructive sleep apnea. Otolaryngol Head Neck Surg. 2003 Oct;129(4):327-35.  4. Len THOMAS, Goldberg A. Hypopharyngeal Surgery in Obstructive Sleep Apnea: An Evidence-Based Medicine Review. Arch Otolaryngol Head Neck Surg. 2006 Feb;132(2):206-13.  5. Vipul PJ et al. Upper-Airway Stimulation for Obstructive Sleep Apnea.  N Engl J Med. 2014 Jan 9;370(2):139-49.  6. April Y et al. Increased Incidence of Cardiovascular Disease in Middle-aged Men with Obstructive Sleep Apnea. Am J Respir Crit Care Med; 2002 166: 159-165  Alexandra EM et al. Studying Life Effects and Effectiveness of Palatopharyngoplasty (SLEEP) study: Subjective Outcomes of Isolated Uvulopalatopharyngoplasty. Otolaryngol Head Neck Surg. 2011; 144: 623-631.

## 2018-02-15 NOTE — NURSING NOTE
"Chief Complaint   Patient presents with     Sleep Problem     Referred due to having a heart murmur, swelling in legs       Initial BP (!) 184/103  Pulse 71  Resp 16  Ht 1.702 m (5' 7\")  Wt 135.2 kg (298 lb)  SpO2 96%  BMI 46.67 kg/m2 Estimated body mass index is 46.67 kg/(m^2) as calculated from the following:    Height as of this encounter: 1.702 m (5' 7\").    Weight as of this encounter: 135.2 kg (298 lb).  Medication Reconciliation: complete  Neck 41cm   ESS 17  Prema Ba MA    "

## 2018-02-22 ENCOUNTER — RADIANT APPOINTMENT (OUTPATIENT)
Dept: CARDIOLOGY | Facility: CLINIC | Age: 63
End: 2018-02-22
Attending: FAMILY MEDICINE
Payer: COMMERCIAL

## 2018-02-22 DIAGNOSIS — R01.1 UNDIAGNOSED CARDIAC MURMURS: ICD-10-CM

## 2018-02-22 DIAGNOSIS — I27.20 PULMONARY HYPERTENSION (H): Primary | ICD-10-CM

## 2018-02-22 PROCEDURE — 93306 TTE W/DOPPLER COMPLETE: CPT | Performed by: INTERNAL MEDICINE

## 2018-02-22 NOTE — PROGRESS NOTES
Ms. Murray,    Your echocardiogram has demonstrated a few things.  First, it shows a possible explanation of your murmur which may be coming from a leaky mitral valve.  Second, it shows mild elevated pressures in the artery leading from your heart to your lungs.  This is called pulmonary hypertension.      At times, we need to go on a hunt to find out why you have pulmonary hypertension.  For this reason, I'd like you to see one of my cardiology colleagues at our [place] clinic.  I particularly like either Yovani Don or Jaden Forde for a cardiologist.  They may be able to help us figure out why you've developed this as well as things we can do to treat it.  As an aside, I have a suspicion that obstructive sleep apnea may be contributing to it so please follow through and have your sleep study done as treating your suspected obstructive sleep apnea may help with the pulmonary hypertension.    You can schedule with either Dr. Don or Dr. Forde by calling [facility] [phone].   [email or web address]     If you have further questions about the interpretation of your labs, [email or web address] is a good website to check out for further information.    Please contact the clinic if you have additional questions.  Thank you.    Sincerely,    Ga Ken MD

## 2018-02-23 ENCOUNTER — TELEPHONE (OUTPATIENT)
Dept: FAMILY MEDICINE | Facility: CLINIC | Age: 63
End: 2018-02-23

## 2018-02-23 NOTE — TELEPHONE ENCOUNTER
TC called patient to inform next Card appointment here at [facility] is 3/14/18  Patient stated her PCP suggested Dr HENSON and Eula HENSON does not come to  and Eula would have 3/14/18  TC did schedule patient for  3/14/18 (Wed) 2:45 PM 30 min Jaden Forde MD     Patient wants to know if her PCP is ok with this or if she should be seen sooner at the [place] location?  Please call patient back at  [phone]  Taylor ATKINS

## 2018-02-23 NOTE — TELEPHONE ENCOUNTER
Reason for Call:  Other appointment    Detailed comments: pt has referral for Cardiology order from Dr Ken. wants TC to call her and Schedule appt at [facility].    Phone Number Patient can be reached at: Home number on file [phone] (home)    Best Time: any    Can we leave a detailed message on this number? YES    Call taken on 2/23/2018 at 12:23 PM by Jessenia Pino

## 2018-03-05 ENCOUNTER — OFFICE VISIT (OUTPATIENT)
Dept: FAMILY MEDICINE | Facility: CLINIC | Age: 63
End: 2018-03-05
Payer: COMMERCIAL

## 2018-03-05 VITALS
OXYGEN SATURATION: 98 % | WEIGHT: 292 LBS | HEIGHT: 67 IN | BODY MASS INDEX: 45.83 KG/M2 | SYSTOLIC BLOOD PRESSURE: 136 MMHG | HEART RATE: 80 BPM | DIASTOLIC BLOOD PRESSURE: 84 MMHG | TEMPERATURE: 98.5 F

## 2018-03-05 DIAGNOSIS — R82.90 NONSPECIFIC FINDING ON EXAMINATION OF URINE: ICD-10-CM

## 2018-03-05 DIAGNOSIS — Z00.00 ROUTINE GENERAL MEDICAL EXAMINATION AT A HEALTH CARE FACILITY: ICD-10-CM

## 2018-03-05 DIAGNOSIS — Z12.31 VISIT FOR SCREENING MAMMOGRAM: ICD-10-CM

## 2018-03-05 DIAGNOSIS — R26.81 UNSTEADINESS: Primary | ICD-10-CM

## 2018-03-05 DIAGNOSIS — K08.9 POOR DENTITION: ICD-10-CM

## 2018-03-05 DIAGNOSIS — Z12.4 SCREENING FOR MALIGNANT NEOPLASM OF CERVIX: ICD-10-CM

## 2018-03-05 DIAGNOSIS — Z12.11 SCREEN FOR COLON CANCER: ICD-10-CM

## 2018-03-05 LAB
ALBUMIN UR-MCNC: NEGATIVE MG/DL
APPEARANCE UR: CLEAR
BACTERIA #/AREA URNS HPF: ABNORMAL /HPF
BILIRUB UR QL STRIP: NEGATIVE
COLOR UR AUTO: YELLOW
GLUCOSE UR STRIP-MCNC: NEGATIVE MG/DL
HGB UR QL STRIP: ABNORMAL
KETONES UR STRIP-MCNC: NEGATIVE MG/DL
LEUKOCYTE ESTERASE UR QL STRIP: ABNORMAL
MUCOUS THREADS #/AREA URNS LPF: PRESENT /LPF
NITRATE UR QL: NEGATIVE
NON-SQ EPI CELLS #/AREA URNS LPF: ABNORMAL /LPF
PH UR STRIP: 5.5 PH (ref 5–7)
RBC #/AREA URNS AUTO: ABNORMAL /HPF
SOURCE: ABNORMAL
SP GR UR STRIP: 1.02 (ref 1–1.03)
UROBILINOGEN UR STRIP-ACNC: 0.2 EU/DL (ref 0.2–1)
WBC #/AREA URNS AUTO: ABNORMAL /HPF

## 2018-03-05 PROCEDURE — 80053 COMPREHEN METABOLIC PANEL: CPT | Performed by: FAMILY MEDICINE

## 2018-03-05 PROCEDURE — 81001 URINALYSIS AUTO W/SCOPE: CPT | Performed by: FAMILY MEDICINE

## 2018-03-05 PROCEDURE — 99396 PREV VISIT EST AGE 40-64: CPT | Performed by: FAMILY MEDICINE

## 2018-03-05 PROCEDURE — 36415 COLL VENOUS BLD VENIPUNCTURE: CPT | Performed by: FAMILY MEDICINE

## 2018-03-05 PROCEDURE — G0145 SCR C/V CYTO,THINLAYER,RESCR: HCPCS | Performed by: FAMILY MEDICINE

## 2018-03-05 PROCEDURE — 87624 HPV HI-RISK TYP POOLED RSLT: CPT | Performed by: FAMILY MEDICINE

## 2018-03-05 PROCEDURE — 84443 ASSAY THYROID STIM HORMONE: CPT | Performed by: FAMILY MEDICINE

## 2018-03-05 PROCEDURE — 87086 URINE CULTURE/COLONY COUNT: CPT | Performed by: FAMILY MEDICINE

## 2018-03-05 NOTE — PROGRESS NOTES
SUBJECTIVE:   CC: Madyson Murray is an 62 year old woman who presents for preventive health visit.     Healthy Habits:    Do you get at least three servings of calcium containing foods daily (dairy, green leafy vegetables, etc.)? yes    Amount of exercise or daily activities, outside of work: no    Problems taking medications regularly No    Medication side effects: No    Have you had an eye exam in the past two years? yes    Do you see a dentist twice per year? no    Do you have sleep apnea, excessive snoring or daytime drowsiness?yes, pt already has a sleep study scheduled for the end of the month      Pulmonary HTN- Pt had echocardiogram done on 2/22/18 that showed she has pulmonary HTN. She has follow up appointment with Dr. Forde on 3/14/18 to see what we should do to manage this.    Preventative measures- Pt has not had mammogram for a few years, but when she did do them yearly in the past she never had to have a follow up US. She does not have family hx of breast cancer. Pt does not want to do tomogram given she has never had an abnormal mammogram in the past. Pt wants to do mammogram every 2 years. She has never had an abnormal Pap smear. She thinks her last Pap was done in 2010 or 2011. She is due for colon cancer screening. She has elected to do FIT kit today. She does not have an advanced directive. She has not been to the dentist in awhile, but knows it is going to be very expensive when she goes because she knows she has multiple dental problems. She knows it is going to cost much more than $3500 for her dental problems. Pt tries to avoid sun exposure as much as possible.    Balance issues- Pt has noticed her balance has been worsening; she is wondering if this is due to her aging.  She brings this up at the very end of her visit.    Pt has had both of her knees replaced, in 2010 and in 2012.    Pt has sleep study on 3/27/18.        Today's PHQ-2 Score:   PHQ-2 ( 1999 [employer]) 2/5/2018   Q1: Little  interest or pleasure in doing things 0   Q2: Feeling down, depressed or hopeless 0   PHQ-2 Score 0     Abuse: Current or Past(Physical, Sexual or Emotional)- No  Do you feel safe in your environment - Yes    Social History   Substance Use Topics     Smoking status: Former Smoker     Packs/day: 1.00     Years: 15.00     Smokeless tobacco: Never Used      Comment: quit around age 32     Alcohol use Yes      Comment: 2-4 drinks couple times per week     If you drink alcohol do you typically have >3 drinks per day or >7 drinks per week? No                     Reviewed orders with patient.  Reviewed health maintenance and updated orders accordingly - Yes    Patient over age 50, mutual decision to screen reflected in health maintenance.    Pertinent mammograms are reviewed under the imaging tab.  History of abnormal Pap smear: NO - age 30- 65 PAP every 3 years recommended    Reviewed and updated as needed this visit by clinical staff  Tobacco  Allergies  Meds       Reviewed and updated as needed this visit by Provider       ROS:  C: NEGATIVE for fever, chills, change in weight  I: NEGATIVE for worrisome rashes, moles or lesions  E: NEGATIVE for vision changes or irritation  ENT: NEGATIVE for ear, mouth and throat problems  R: NEGATIVE for significant cough or SOB  B: NEGATIVE for masses, tenderness or discharge  CV: NEGATIVE for chest pain, palpitations or peripheral edema  GI: NEGATIVE for nausea, abdominal pain, heartburn, or change in bowel habits  : NEGATIVE for unusual urinary or vaginal symptoms. No vaginal bleeding.  M: NEGATIVE for significant arthralgias or myalgia  N: NEGATIVE for weakness, dizziness or paresthesias. POSITIVE for unsteadiness.  P: NEGATIVE for changes in mood or affect     This document serves as a record of the services and decisions personally performed and made by Ga Ken MD. It was created on his behalf by Tyshawn Palacios, a trained medical scribe. The creation of this  "document is based on the provider's statements to the medical scribe.  Tyshawn Palacios 2:58 PM March 5, 2018    OBJECTIVE:   /84 (BP Location: Right arm, Patient Position: Sitting, Cuff Size: Adult Large)  Pulse 80  Temp 98.5  F (36.9  C) (Oral)  Ht 1.702 m (5' 7\")  Wt 132.5 kg (292 lb)  SpO2 98%  BMI 45.73 kg/m2  EXAM:  Patient was offered chaperone for her breast/pelvic exam today.  She was indifferent to this resource.    GENERAL APPEARANCE: healthy, alert and no distress  EYES: Eyes grossly normal to inspection, PERRL and conjunctivae and sclerae normal  HENT: ear canals and TM's normal, nose and mouth without ulcers or lesions, oropharynx clear and oral mucous membranes moist.  Very poor dentition.  NECK: no adenopathy, no asymmetry, masses, or scars and thyroid normal to palpation  RESP: lungs clear to auscultation - no rales, rhonchi or wheezes  BREAST: normal without masses, tenderness or nipple discharge and no palpable axillary masses or adenopathy  CV: regular rate and rhythm, normal S1 S2, no S3 or S4, no murmur, click or rub, no peripheral edema and peripheral pulses strong  ABDOMEN: soft, nontender, no hepatosplenomegaly, no masses and bowel sounds normal   (female): normal female external genitalia, normal urethral meatus, vaginal mucosal atrophy noted, normal cervix, adnexae, and uterus without masses or abnormal discharge  MS: no musculoskeletal defects are noted and gait is age appropriate without ataxia  SKIN: no suspicious lesions or rashes  NEURO: Normal strength and tone, sensory exam grossly normal, mentation intact and speech normal  PSYCH: mentation appears normal and affect normal/bright    Results for orders placed or performed in visit on 03/05/18 (from the past 24 hour(s))   *UA reflex to Microscopic   Result Value Ref Range    Color Urine Yellow     Appearance Urine Clear     Glucose Urine Negative NEG^Negative mg/dL    Bilirubin Urine Negative NEG^Negative    Ketones Urine " Negative NEG^Negative mg/dL    Specific Gravity Urine 1.025 1.003 - 1.035    Blood Urine Trace (A) NEG^Negative    pH Urine 5.5 5.0 - 7.0 pH    Protein Albumin Urine Negative NEG^Negative mg/dL    Urobilinogen Urine 0.2 0.2 - 1.0 EU/dL    Nitrite Urine Negative NEG^Negative    Leukocyte Esterase Urine Moderate (A) NEG^Negative    Source Midstream Urine    Urine Microscopic   Result Value Ref Range    WBC Urine 0 - 5 OTO5^0 - 5 /HPF    RBC Urine O - 2 OTO2^O - 2 /HPF    Squamous Epithelial /LPF Urine Moderate (A) FEW^Few /LPF    Bacteria Urine Many (A) NEG^Negative /HPF    Mucous Urine Present (A) NEG^Negative /LPF       ASSESSMENT/PLAN:     (R26.81) Unsteadiness  (primary encounter diagnosis)  Comment: Discussed with pt that we can check her electrolytes, thyroid and UA today for further evaluation; she was in agreement with this. Furthermore, offered to have pt see OT to evaluate her balance issues. Pt declined this resource at this time, but will continue to monitor her balance issue and will follow up if it worsens or persists.  Plan: Comprehensive metabolic panel (BMP + Alb, Alk         Phos, ALT, AST, Total. Bili, TP), TSH with free        T4 reflex, *UA reflex to Microscopic        Follow up based on labs.    (Z00.00) Routine general medical examination at a health care facility  Comment: Recent echocardiogram showed pt has pulmonary HTN; she has consult appointment with Dr. Forde on 3/14/18. Discussed with pt that Dr. Forde in [facility] may give us medication options to manage her pulmonary HTN, or may recommend further cardiac testing to hopefully find the underlying cause to her pulmonary hypertension. Lastly, pt has sleep study on 3/27/18.  Plan: Will have her follow up in 1 year for her next preventative visit.    (Z12.11) Screen for colon cancer  Comment: Pt due for colon cancer screening, pt has elected to do FIT kit today, can continue to do yearly FIT as long as they are consistently negative, will  recommend colonoscopy with positive FIT. Discussed with pt that we can stop doing colon cancer screening at age 75.  Plan: Fecal colorectal cancer screen (FIT)        Follow up based on labs.    (Z12.31) Visit for screening mammogram  Comment: Pt has never had an abnormal mammogram and does not have a family hx of breast cancer. Discussed with pt having her do a 3D mammogram; it can detect things that normal mammogram cannot, but is more expensive. Furthermore, insurance companies leave it up to patients to do mammogram every year or every other year. Pt declined to do 3D mammogram and wants to do mammogram every 2 years. Discussed with her that if her mammogram is abnormal, I will be able to see these results and will have her follow up with radiology to do a follow up US.  Plan: MA SCREENING DIGITAL BILAT - Future  (s+30)        Follow up based on results.    (Z12.4) Screening for malignant neoplasm of cervix  Comment: Pt last had Pap in 2010 or 2011 but has never had an abnormal Pap. Discussed with pt that HPV is the most common STD. Vast majority of women that contract this infection do not have residual effects from the infection after it resolves. However, some women have residual inflammation. Discussed with her that given she has never had an abnormal Pap smear, if her Pap smear is normal today with negative HPV testing, this will be the last Pap smear she will need to do.  Plan: Pap imaged thin layer screen with HPV -         recommended age 30 - 65 years (select HPV order        below)        Follow up based on labs.    (K08.9) Poor dentition  Comment: Pt has financial constraints that limit her dental care. Offered to have pt see our care coordinator Jericho Gallego to discuss financial options to help her afford dental care; pt agreed to talk to her. Advised pt that Jericho will reach out to her.  Plan: CARE COORDINATION REFERRAL        Follow up if needed.    COUNSELING:   Reviewed preventive health  "counseling, as reflected in patient instructions       Regular exercise       Healthy diet/nutrition       Immunizations    UTD          Safe sex practices/STD prevention       Colon cancer screening     reports that she has quit smoking. She has a 15.00 pack-year smoking history. She has never used smokeless tobacco.    Estimated body mass index is 45.73 kg/(m^2) as calculated from the following:    Height as of this encounter: 1.702 m (5' 7\").    Weight as of this encounter: 132.5 kg (292 lb).   Weight management plan: Discussed healthy diet and exercise guidelines and patient will follow up in 12 months in clinic to re-evaluate.    Counseling Resources:  ATP IV Guidelines  Pooled Cohorts Equation Calculator  Breast Cancer Risk Calculator  FRAX Risk Assessment  ICSI Preventive Guidelines  Dietary Guidelines for Americans, 2010  USDA's MyPlate  ASA Prophylaxis  Lung CA Screening    The information in this document, created by the medical scribe for me, accurately reflects the services I personally performed and the decisions made by me. I have reviewed and approved this document for accuracy prior to leaving the patient care area.  March 5, 2018 2:58 PM    Ga Ken Jr, MD  [facility]  "

## 2018-03-05 NOTE — NURSING NOTE
"Chief Complaint   Patient presents with     Physical       Initial /84 (BP Location: Right arm, Patient Position: Sitting, Cuff Size: Adult Large)  Pulse 80  Temp 98.5  F (36.9  C) (Oral)  Ht 5' 7\" (1.702 m)  Wt 292 lb (132.5 kg)  SpO2 98%  BMI 45.73 kg/m2 Estimated body mass index is 45.73 kg/(m^2) as calculated from the following:    Height as of this encounter: 5' 7\" (1.702 m).    Weight as of this encounter: 292 lb (132.5 kg).  Medication Reconciliation: complete   Lidia Dixon MA    "

## 2018-03-05 NOTE — MR AVS SNAPSHOT
After Visit Summary   3/5/2018    Madyson Murray    MRN: [id number]           Patient Information     Date Of Birth          1955        Visit Information        Provider Department      3/5/2018 2:20 PM Ga Ken Jr., MD [facility]        Today's Diagnoses     Routine general medical examination at a health care facility        Screen for colon cancer        Visit for screening mammogram        Screening for malignant neoplasm of cervix          Care Instructions      Preventive Health Recommendations  Female Ages 50 - 64    Yearly exam: See your health care provider every year in order to  o Review health changes.   o Discuss preventive care.    o Review your medicines if your doctor has prescribed any.      Get a Pap test every three years (unless you have an abnormal result and your provider advises testing more often).    If you get Pap tests with HPV test, you only need to test every 5 years, unless you have an abnormal result.     You do not need a Pap test if your uterus was removed (hysterectomy) and you have not had cancer.    You should be tested each year for STDs (sexually transmitted diseases) if you're at risk.     Have a mammogram every 1 to 2 years.    Have a colonoscopy at age 50, or have a yearly FIT test (stool test). These exams screen for colon cancer.      Have a cholesterol test every 5 years, or more often if advised.    Have a diabetes test (fasting glucose) every three years. If you are at risk for diabetes, you should have this test more often.     If you are at risk for osteoporosis (brittle bone disease), think about having a bone density scan (DEXA).    Shots: Get a flu shot each year. Get a tetanus shot every 10 years.    Nutrition:     Eat at least 5 servings of fruits and vegetables each day.    Eat whole-grain bread, whole-wheat pasta and brown rice instead of white grains and rice.    Talk to your provider about Calcium and Vitamin D.      Lifestyle    Exercise at least 150 minutes a week (30 minutes a day, 5 days a week). This will help you control your weight and prevent disease.    Limit alcohol to one drink per day.    No smoking.     Wear sunscreen to prevent skin cancer.     See your dentist every six months for an exam and cleaning.    See your eye doctor every 1 to 2 years.            Follow-ups after your visit        Follow-up notes from your care team     Return in about 1 year (around 3/5/2019) for Preventative Visit.      Your next 10 appointments already scheduled     Mar 14, 2018  2:45 PM CDT   New Visit with Jaden Forde MD   [facility] ([facility])    [address] [address]   [phone]            Mar 27, 2018  8:30 PM CDT   Psg Split W/Tcm with BED 2 SH SLEEP   [facility] ([facility])    [address] [address]   [phone]            Apr 16, 2018  3:30 PM CDT   Return Sleep Patient with Bennett Ezra Goltz, PA-C   [facility] ([facility])    [address]  [facility] [address]   [phone]              Future tests that were ordered for you today     Open Future Orders        Priority Expected Expires Ordered    MA SCREENING DIGITAL BILAT - Future  (s+30) Routine  3/5/2019 3/5/2018    Fecal colorectal cancer screen (FIT) Routine 3/26/2018 5/28/2018 3/5/2018            Who to contact     If you have questions or need follow up information about today's clinic visit or your schedule please contact [facility] [place] directly at [phone].  Normal or non-critical lab and imaging results will be communicated to you by MyChart, letter or phone within 4 business days after the clinic has received the results. If you do not hear from us within 7 days, please contact the clinic through [employer] or  "phone. If you have a critical or abnormal lab result, we will notify you by phone as soon as possible.  Submit refill requests through [employer] or call your pharmacy and they will forward the refill request to us. Please allow 3 business days for your refill to be completed.          Additional Information About Your Visit        [employer] Information     [employer] gives you secure access to your electronic health record. If you see a primary care provider, you can also send messages to your care team and make appointments. If you have questions, please call your primary care clinic.  If you do not have a primary care provider, please call [phone] and they will assist you.        Care EveryWhere ID     This is your Care EveryWhere ID. This could be used by other organizations to access your [place] medical records  [id number]        Your Vitals Were     Pulse Temperature Height Pulse Oximetry BMI (Body Mass Index)       80 98.5  F (36.9  C) (Oral) 5' 7\" (1.702 m) 98% 45.73 kg/m2        Blood Pressure from Last 3 Encounters:   03/05/18 136/84   02/15/18 140/87   02/05/18 132/80    Weight from Last 3 Encounters:   03/05/18 292 lb (132.5 kg)   02/15/18 298 lb (135.2 kg)   02/05/18 295 lb (133.8 kg)              We Performed the Following     Pap imaged thin layer screen with HPV - recommended age 30 - 65 years (select HPV order below)        Primary Care Provider Office Phone # Fax #    Ga Ken Jr., [phone] [address]  [place] [address]        Equal Access to Services     [facility]: Hadii aad ku hadasho Soomaali, waaxda luqadaha, qaybta kaalmada adeegcaro, chase chen . So [facility] [phone].    ATENCIÓN: Si habla español, tiene a israel disposición servicios gratuitos de asistencia lingüística. Llame al [phone].    We comply with applicable federal civil rights laws and [place] laws. We do not discriminate on the basis of race, color, " national origin, age, disability, sex, sexual orientation, or gender identity.            Thank you!     Thank you for choosing [facility]  for your care. Our goal is always to provide you with excellent care. Hearing back from our patients is one way we can continue to improve our services. Please take a few minutes to complete the written survey that you may receive in the mail after your visit with us. Thank you!             Your Updated Medication List - Protect others around you: Learn how to safely use, store and throw away your medicines at www.disposemymeds.org.          This list is accurate as of 3/5/18  3:21 PM.  Always use your most recent med list.                   Brand Name Dispense Instructions for use Diagnosis    IBUPROFEN PO      Take 200 mg by mouth daily 4 tablets daily        zolpidem 5 MG tablet    AMBIEN    1 tablet    Take tablet by mouth 15 minutes prior to sleep, for Sleep Study

## 2018-03-06 ENCOUNTER — CARE COORDINATION (OUTPATIENT)
Dept: CARE COORDINATION | Facility: CLINIC | Age: 63
End: 2018-03-06

## 2018-03-06 LAB
ALBUMIN SERPL-MCNC: 3.9 G/DL (ref 3.4–5)
ALP SERPL-CCNC: 88 U/L (ref 40–150)
ALT SERPL W P-5'-P-CCNC: 25 U/L (ref 0–50)
ANION GAP SERPL CALCULATED.3IONS-SCNC: 6 MMOL/L (ref 3–14)
AST SERPL W P-5'-P-CCNC: 26 U/L (ref 0–45)
BACTERIA SPEC CULT: NORMAL
BACTERIA SPEC CULT: NORMAL
BILIRUB SERPL-MCNC: 1.2 MG/DL (ref 0.2–1.3)
BUN SERPL-MCNC: 18 MG/DL (ref 7–30)
CALCIUM SERPL-MCNC: 9 MG/DL (ref 8.5–10.1)
CHLORIDE SERPL-SCNC: 104 MMOL/L (ref 94–109)
CO2 SERPL-SCNC: 28 MMOL/L (ref 20–32)
CREAT SERPL-MCNC: 0.85 MG/DL (ref 0.52–1.04)
GFR SERPL CREATININE-BSD FRML MDRD: 68 ML/MIN/1.7M2
GLUCOSE SERPL-MCNC: 81 MG/DL (ref 70–99)
POTASSIUM SERPL-SCNC: 4 MMOL/L (ref 3.4–5.3)
PROT SERPL-MCNC: 7.6 G/DL (ref 6.8–8.8)
SODIUM SERPL-SCNC: 138 MMOL/L (ref 133–144)
SPECIMEN SOURCE: NORMAL
TSH SERPL DL<=0.005 MIU/L-ACNC: 2.38 MU/L (ref 0.4–4)

## 2018-03-06 NOTE — PROGRESS NOTES
Clinic Care Coordination Contact    Referral source:  PCP -    CARE COORDINATION REFERRAL [[id number]]   Electronically signed by: Ga Ken Jr., MD on 03/05/18 1524 Status: Completed   Ordering user: Ga Ken Jr., MD 03/05/18 1524     Released by: Alexandria Horvath CMA 03/05/18 1524     Order History   Outpatient   Date/Time Action Taken User Additional Information   03/05/18 1521 Pend Ga Ken Jr., MD    03/05/18 1524 Sign Ga Ken Jr., MD    03/05/18 1524 Complete Ga Ken Jr., MD    Comments   Services are provided by a Care Coordinator for people with complex needs such as: medical, social, or financial troubles.  The Care Coordinator works with the patient and their Primary Care Provider to determine health goals, obtain resources, achieve outcomes, and develop care plans that help coordinate the patient's care.     Reason for Referral: Financial Support: dental work needed and concern about cost of this    Additional pertinent details:      Clinical Staff have discussed the Care Coordination Referral with the patient and/or caregiver: yes   Associated Diagnoses   Poor dentition [K08.9]           Per Dr. Ken's 03/05/2018 OV notes-   She has not been to the dentist in awhile, but knows it is going to be very expensive when she goes because she knows she has multiple dental problems. She knows it is going to cost much more than $3500 for her dental problems.    (K08.9) Poor dentition  Comment: Pt has financial constraints that limit her dental care. Offered to have pt see our care coordinator Jericho Gallego to discuss financial options to help her afford dental care; pt agreed to talk to her. Advised pt that Jericho will reach out to her.  Plan: CARE COORDINATION REFERRAL        Follow up if  needed.  -------------------------------------------------------------------------------------------------------------------------------------------------------------------------------------------------------------------------------------------------------------------------------------------------------------------------------------------------------------------------      Patient Active Problem List    Diagnosis Date Noted     Morbid obesity (H) 02/05/2018     Priority: Medium     CARDIOVASCULAR SCREENING; LDL GOAL LESS THAN 130 02/05/2018     Priority: Medium       UTC/Voicemail  Clinical Data: Care Coordinator Outreach  Outreach attempted x 1.  Left message on voicemail with call back information and requested return call.  Plan: Care Coordinator will try to reach patient again in 1-2 business days.    Aurora Steward, REJI  [facility] Care Coordinator - [place] and [place] Locations   Direct:  [phone] (voicemail available)

## 2018-03-06 NOTE — LETTER
"[facility] CARE COORDINATION        March 16, 2018    Madyson Murray  [address]    [facility] [address]      Dear Madyson,    I am a clinic care coordinator who works with Ga Ken Jr, MD at [facility] I have been trying to reach you recently to introduce Clinic Care Coordination and to see if there was anything I could assist you with.  I wanted to introduce myself and provide you with my contact information so that you can call me with questions or concerns about your health care. Below is a description of clinic care coordination and how I can further assist you.     The clinic care coordinator is a registered nurse and/or  who understand the health care system. The goal of clinic care coordination is to help you manage your health and improve access to the [facility] in the most efficient manner. The registered nurse can assist you in meeting your health care goals by providing education, coordinating services, and strengthening the communication among your providers. The  can assist you with financial, behavioral, psychosocial, chemical dependency, counseling, and/or psychiatric resources.    We at [facility] are focused on providing you with the highest-quality healthcare experience possible and that all starts with you.     Sincerely,     Yelena \"Jericho\" Latricia, RN -  [phone]  Aurora Steward, RN - [phone]                        Enclosed:   I have enclosed a copy of a 24 Hour Access Plan. This has helpful phone numbers for you to call when needed. Please keep this in an easy to access place to use as needed.   I have enclosed helpful educational material/resources. Please review and call me with any questions.                                          [facility]  This is a list of dental clinics in or around the [place] area that have indicated they provide dental services to patients who are on public care programs, or that offer " dental services at a reduced cost. Find a clinic in your area, and then call to make an appointment. Please note that services, hours and costs at these clinics vary, so you may find it helpful to visit the clinic website for more information.  Please note that this is not a comprehensive list of all [facility] dental clinics that provide such services, nor are these clinics expressly endorsed by the [facility]. In addition, many private practice dentists who are not listed here provide care to those who are enrolled in [place] Health Care Programs (Medical Assistance, [facility], and General Assistance Medical Care).  Also, there are special resources where limited amounts of reduced cost or free dental services are offered under certain circumstances.      [facility] (Dental cleaning,  deep cleaning - periodontal therapy  and x-rays)  [address] [place] [address]  [phone]    [facility]  [address] [address] [address] [phone]    Dentures ASAP  Dr. Unruly Bliss  [facility]  [address] [place], [place] [address] [phone]    [facility]  [address] [place], [place] [address] [phone]    [facility] (Dental cleaning,  deep cleaning- periodontal therapy  and x-rays)  [address] [place], [place] [address] [phone]        [facility] (Sliding fee scale dental services and some state medical assistance programs accepted)  [address] [place] [id number] [phone]    [facility] (Dr. Leon Charlton - specific criteria and medical necessity required)  [facility] Professional Building, 2nd Floor, [address]  [address] [place], [place] [address] [phone]    [facility]  [address] [place] [address] [place]  [phone] (main  clinic)  After Hours: Adult emergencies [phone] Pediatric emergencies [phone]    [facility]  [address] [place] [address]  [phone]    [facility] (Dental cleaning,  deep cleaning- periodontal therapy  and x-rays).  [address] [place] [address]  [phone]

## 2018-03-06 NOTE — LETTER
Health Care Home - Access Care Plan    About Me  Patient Name:  Madyson Murray    YOB: 1955  Age:                             62 year old   [place] MRN:            [id number] Telephone Information:     Home Phone [phone]   Mobile [phone]       Address:    [address] [place] [address] Email address:  [email or web address]      Emergency Contact(s)  Name Relationship Lgl Grd Work Phone Home Phone Mobile Phone   BERNADETTE DE SANTIAGO Brother   [phone]              Health Maintenance: Routine Health maintenance Reviewed: Not assessed    My Access Plan  Medical Emergency 911   Questions or concerns during clinic hours Primary Clinic Line, I will call the clinic directly: [facility] [phone]   24 Hour Appointment Line [phone] or  [phone]) (toll free)   24 Hour Nurse Line [phone] (toll free)   Questions or concerns outside clinic hours 24 Hour Appointment Line, I will call the after-hours on-call line:   [facility] [phone] or [phone]) (toll-free)   Preferred Urgent Care  (Unknown )   Preferred Hospital  (Unknown )   Preferred Pharmacy [id number] - [place], [place] - [address] Behavioral Health Crisis Line The National Suicide Prevention Lifeline at 1-274.164.3572 or 911     My Care Team Members  Patient Care Team       Relationship Specialty Notifications Start End    Ga Ken Jr., MD PCP - General Family Practice  1/29/18     Phone: [phone] Fax: [phone] [address] [place] [address]        My Medical and Care Information  Problem List   Patient Active Problem List   Diagnosis     Morbid obesity (H)     CARDIOVASCULAR SCREENING; LDL GOAL LESS THAN 130     Pulmonary hypertension     Non-rheumatic tricuspid valve insufficiency     Obstructive sleep apnea syndrome     Venous (peripheral) insufficiency      Current Medications and Allergies:  See printed  Medication Report

## 2018-03-07 NOTE — PROGRESS NOTES
Ms. Murray,    -Urine culture is normal.  There is no need for antibiotics at this point.  If new, worsening or persistent symptoms, then you should call or return for a recheck.    If you have further questions about the interpretation of your labs, [email or web address] is a good website to check out for further information.    Please contact the clinic if you have additional questions.  Thank you.    Sincerely,    Ga Ken MD

## 2018-03-08 LAB
COPATH REPORT: NORMAL
PAP: NORMAL

## 2018-03-08 NOTE — PROGRESS NOTES
Clinic Care Coordination Contact  RUST/Voicemail    Referral Source: PCP  Clinical Data: Care Coordinator Outreach  Outreach attempted x 2.  Left message on voicemail with call back information and requested return call.  Plan:  Care Coordinator will try to reach patient again in 3-5 business days.    Aurora Steward, RN  [facility] Care Coordinator - [place] and [place] Locations   Direct:  [phone] (voicemail available)

## 2018-03-09 LAB
FINAL DIAGNOSIS: NORMAL
HPV HR 12 DNA CVX QL NAA+PROBE: NEGATIVE
HPV16 DNA SPEC QL NAA+PROBE: NEGATIVE
HPV18 DNA SPEC QL NAA+PROBE: NEGATIVE
SPECIMEN DESCRIPTION: NORMAL
SPECIMEN SOURCE CVX/VAG CYTO: NORMAL

## 2018-03-13 PROCEDURE — 82274 ASSAY TEST FOR BLOOD FECAL: CPT | Performed by: FAMILY MEDICINE

## 2018-03-14 ENCOUNTER — OFFICE VISIT (OUTPATIENT)
Dept: CARDIOLOGY | Facility: CLINIC | Age: 63
End: 2018-03-14
Payer: COMMERCIAL

## 2018-03-14 VITALS
DIASTOLIC BLOOD PRESSURE: 75 MMHG | HEIGHT: 67 IN | OXYGEN SATURATION: 98 % | WEIGHT: 292 LBS | SYSTOLIC BLOOD PRESSURE: 144 MMHG | HEART RATE: 82 BPM | BODY MASS INDEX: 45.83 KG/M2

## 2018-03-14 DIAGNOSIS — G47.33 OBSTRUCTIVE SLEEP APNEA SYNDROME: ICD-10-CM

## 2018-03-14 DIAGNOSIS — I36.1 NON-RHEUMATIC TRICUSPID VALVE INSUFFICIENCY: ICD-10-CM

## 2018-03-14 DIAGNOSIS — I87.2 VENOUS (PERIPHERAL) INSUFFICIENCY: ICD-10-CM

## 2018-03-14 DIAGNOSIS — E66.01 MORBID OBESITY (H): Primary | ICD-10-CM

## 2018-03-14 DIAGNOSIS — I27.20 PULMONARY HYPERTENSION (H): ICD-10-CM

## 2018-03-14 DIAGNOSIS — R03.0 ELEVATED BLOOD PRESSURE READING WITHOUT DIAGNOSIS OF HYPERTENSION: ICD-10-CM

## 2018-03-14 PROCEDURE — 93000 ELECTROCARDIOGRAM COMPLETE: CPT | Performed by: INTERNAL MEDICINE

## 2018-03-14 PROCEDURE — 99244 OFF/OP CNSLTJ NEW/EST MOD 40: CPT | Performed by: INTERNAL MEDICINE

## 2018-03-14 NOTE — LETTER
3/14/2018    Ga Ken Jr, MD  [address] [place] [address]    RE: Madyson Murray       Dear Colleague,    I had the pleasure of seeing Madyson Murray in the [facility].    HPI and Plan:   I had the pleasure of seeing Madyson Murray in cardiology consultation on request of Dr. Ken for pulmonary hypertension.    Madyson is a pleasant 62-year-old female.  She has a past medical history of weight issues and some leg edema.  She has been using leg stockings that were given to her after her knee surgery.  She was seen by Dr. Ken recently and he heard a murmur and that prompted an echocardiogram.  Echocardiogram reveals normal LV systolic dysfunction with sigmoid septum.  There was evidence of mild to moderate tricuspid regurgitation and mild pulmonary hypertension with RVSP of 37+ right atrial pressure.  Inferior vena cava was normal in size with normal collapsibility.  The right ventricle was normal in size with normal function.  I did review the echocardiogram myself.  The septal wall thickness appears to be somewhat overestimated but it appears to be a sigmoid septum.    Patient has 15-pack-year history of smoking but none recently.  She has quit several years ago.  She does have history of snoring and inadequate sleep as well as daytime drowsiness and therefore has been referred for a sleep study which has been scheduled for end of March.    Her blood pressures are intermittently elevated.  However, there is no formal diagnosis of hypertension.  She does have some joint pains and therefore takes Motrin practically every day.    She denies chest pain.  There is no orthopnea or PND.    Her weight issues are not new but she is not a person who likes to do calorie counting.  She lives alone with her dog.  She was not interested at this time to be referred to a dietitian.    Physical examination revealed blood pressure 144/75, pulse 82/min regular.  Cardiac examination as  described below.    An EKG was done today and reviewed by me.  It reveals sinus rhythm without ischemic changes.     Impression  1.  Pulmonary hypertension  At this time, her pulmonary hypertension appears to be more secondary to elevated blood pressure untreated hypertension, sleep apnea and possibly diastolic dysfunction.  I agree with proceeding with sleep study.  I would also suggest doing a 24 hour ambulatory blood pressure instrument to see if there is evidence of hypertension and if confirmed, should be treated aggressively.  She does follow a low-salt diet.  Weight loss would be useful and she understands that.  Given her history of smoking, there may be an element of lung parenchymal disease but clinically with only 15-pack-year history of smoking and  based on lung auscultatory findings, it is less likely.    2.  Sleep apnea, will proceed with sleep study.    3.  Leg edema  This may be related to venous insufficiency.  Will start with venous incompetency testing.  If abnormal, we should get her prescription compression stockings.  If those fail, she may be a candidate for venous ablation.    4.  We talked at length about decreasing usage of Motrin only on as-needed basis and using instead extra strength Tylenol which is allowed up to less than 3 g per day.    I will see her back in follow-up after the 24 hour ambulatory blood pressure instrument and venous ultrasound is completed.  I will also review the sleep study results with her at that time.    Thank you for allowing us to participate in the care of this nice patient.    Sincerely,    Jaden Forde MD    Orders Placed This Encounter   Procedures     US Venous Competency Bilateral     Follow-Up with Cardiologist     24 Hour Blood Pressure Monitor - Adult       No orders of the defined types were placed in this encounter.      There are no discontinued medications.      Encounter Diagnoses   Name Primary?     Morbid obesity (H) Yes     Pulmonary  hypertension      Non-rheumatic tricuspid valve insufficiency      Obstructive sleep apnea syndrome      Venous (peripheral) insufficiency      Elevated blood pressure reading without diagnosis of hypertension        CURRENT MEDICATIONS:  Current Outpatient Prescriptions   Medication Sig Dispense Refill     IBUPROFEN PO Take 200 mg by mouth daily 4 tablets daily       zolpidem (AMBIEN) 5 MG tablet Take tablet by mouth 15 minutes prior to sleep, for Sleep Study 1 tablet 0       ALLERGIES     Allergies   Allergen Reactions     Liquid Adhesive        PAST MEDICAL HISTORY:  Past Medical History:   Diagnosis Date     Pulmonary hypertension      Tricuspid regurgitation        PAST SURGICAL HISTORY:  Past Surgical History:   Procedure Laterality Date     ORTHOPEDIC SURGERY      2010 and 2012       FAMILY HISTORY:  Family History   Problem Relation Age of Onset     Hypertension Mother      DIABETES Father      Hypertension Father      Parkinsonism Father      DIABETES Sister      Hypertension Sister      Sleep Apnea Sister      Sleep Apnea Brother        SOCIAL HISTORY:  Social History     Social History     Marital status:      Spouse name: N/A     Number of children: N/A     Years of education: N/A     Social History Main Topics     Smoking status: Former Smoker     Packs/day: 1.00     Years: 15.00     Smokeless tobacco: Never Used      Comment: quit around age 32     Alcohol use Yes      Comment: 2-4 drinks couple times per week     Drug use: No     Sexual activity: Yes     Other Topics Concern     None     Social History Narrative       Review of Systems:  Skin:  Negative       Eyes:  Positive for visual blurring;glasses    ENT:  Negative      Respiratory:  Positive for cough;shortness of breath     Cardiovascular:  Negative      Gastroenterology: Negative      Genitourinary:  Negative      Musculoskeletal:  Positive for back pain    Neurologic:  Positive for headaches    Psychiatric:  Negative     "  Heme/Lymph/Imm:  Negative      Endocrine:  Negative        Physical Exam:  Vitals: /75  Pulse 82  Ht 1.702 m (5' 7\")  Wt 132.5 kg (292 lb)  SpO2 98%  BMI 45.73 kg/m2    Constitutional:    obese      Skin:  warm and dry to the touch          Head:  normocephalic        Eyes:  pupils equal and round        Lymph:No Cervical lymphadenopathy present     ENT:           Neck:  carotid pulses are full and equal bilaterally        Respiratory:  clear to auscultation         Cardiac: regular rhythm;normal S1 and S2       systolic ejection murmur;grade 2;RUSB                                                 GI:    obese      Extremities and Muscular Skeletal:      1+;trace;bilateral LE edema          Neurological:  no gross motor deficits        Psych:  Alert and Oriented x 3        CC  Ga Ken Jr., MD  [address] [place] [address]                Thank you for allowing me to participate in the care of your patient.      Sincerely,     Jaden Forde MD     [facility]    cc:   Ga Ken Jr., MD  [address] [place] [address]        "

## 2018-03-14 NOTE — MR AVS SNAPSHOT
"              After Visit Summary   3/14/2018    Madyson Murray    MRN: [id number]           Patient Information     Date Of Birth          1955        Visit Information        Provider Department      3/14/2018 2:45 PM Jaden Forde MD [facility]        Today's Diagnoses     Morbid obesity (H)    -  1    Pulmonary hypertension        Non-rheumatic tricuspid valve insufficiency        Obstructive sleep apnea syndrome        Venous (peripheral) insufficiency        Elevated blood pressure reading without diagnosis of hypertension           Follow-ups after your visit        Additional Services     Follow-Up with Cardiologist                 Your next 10 appointments already scheduled     Mar 19, 2018  3:15 PM CDT   (Arrive by 3:00 PM)   MA SCREENING DIGITAL BILATERAL with SHBCMA2   [facility] [place] [facility])    [address] [place] [address], [address]  [facility] [address] [phone]           Do not use any powder, lotion or deodorant under your arms or on your breast. If you do, we will ask you to remove it before your exam.  Wear comfortable, two-piece clothing.  If you have any allergies, tell your care team.  Bring any previous mammograms from other facilities or have them mailed to the breast center. Three-dimensional (3D) mammograms are available at [place] locations in [facility], [facility], [place], [place], and [place]. [facility] locations include [place] and [facility] in [place]. Benefits of 3D mammograms include: - Improved rate of cancer detection - Decreases your chance of having to go back for more tests, which means fewer: - \"False-positive\" results (This means that there is an abnormal area but it isn't cancer.) - Invasive testing procedures, such as a biopsy or surgery - Can provide clearer images of the breast if you have dense breast tissue. 3D " mammography is an optional exam that anyone can have with a 2D mammogram. It doesn't replace or take the place of a 2D mammogram. 2D mammograms remain an effective screening test for all women.  Not all insurance companies cover the cost of a 3D mammogram. Check with your insurance.            Mar 20, 2018  2:00 PM CDT   24 Hour Blood Pressure Monitor with SHMON   [facility] ([facility])    [address]  [place] [address] [phone]            Mar 21, 2018  3:00 PM CDT   (Arrive by 2:45 PM)   US LOWER EXTREMITY VENOUS COMPETENCY BILATERAL with SUVUS1   [facility] [place] ([facility])    [address] [facility] [address]  [place] [address] [phone]           Please bring a list of your medicines (including vitamins, minerals and over-the-counter drugs). Also, tell your doctor about any allergies you may have. Wear comfortable clothes and leave your valuables at home.  You do not need to do anything special to prepare for your exam.  Please call the Imaging Department at your exam site with any questions.            Mar 27, 2018  8:30 PM CDT   Psg Split W/Tcm with BED 2 SH SLEEP   [facility] ([facility])    [address]  [place] [address] [phone]            Apr 16, 2018  3:30 PM CDT   Return Sleep Patient with Bennett Ezra Goltz, PA-C   [facility] ([facility])    [address]  [facility] [id number] [phone]              Future tests that were ordered for you today     Open Future Orders        Priority Expected Expires Ordered    Follow-Up with Cardiologist Routine 4/13/2018 3/14/2019 3/14/2018    US Venous Competency Bilateral Routine 4/13/2018 3/14/2019 3/14/2018    24 Hour Blood Pressure Monitor - Adult Routine 3/21/2018 3/14/2019 3/14/2018            Who to  "contact     If you have questions or need follow up information about today's clinic visit or your schedule please contact [facility]   [place] directly at [phone].  Normal or non-critical lab and imaging results will be communicated to you by MyChart, letter or phone within 4 business days after the clinic has received the results. If you do not hear from us within 7 days, please contact the clinic through MyChart or phone. If you have a critical or abnormal lab result, we will notify you by phone as soon as possible.  Submit refill requests through [employer] or call your pharmacy and they will forward the refill request to us. Please allow 3 business days for your refill to be completed.          Additional Information About Your Visit        5o9hart Information     [employer] gives you secure access to your electronic health record. If you see a primary care provider, you can also send messages to your care team and make appointments. If you have questions, please call your primary care clinic.  If you do not have a primary care provider, please call [phone] and they will assist you.        Care EveryWhere ID     This is your Care EveryWhere ID. This could be used by other organizations to access your [facility] medical records  [id number]        Your Vitals Were     Pulse Height Pulse Oximetry BMI (Body Mass Index)          82 1.702 m (5' 7\") 98% 45.73 kg/m2         Blood Pressure from Last 3 Encounters:   03/14/18 144/75   03/05/18 136/84   02/15/18 140/87    Weight from Last 3 Encounters:   03/14/18 132.5 kg (292 lb)   03/05/18 132.5 kg (292 lb)   02/15/18 135.2 kg (298 lb)              We Performed the Following     EKG 12-lead complete w/read - Clinics        Primary Care Provider Office Phone # Fax #    Ga Ken Jr., [phone] [address]  [place] [address]        Equal Access to Services     DOMITILA YUN AH: Pili mayes " Sergei, wilda lutonyadaha, rosi kamarisela brannon, [place] coronajules niurkavioleta laXochiltmanish sil. So [facility] [phone].    ATENCIÓN: Si habla faustino, tiene a israel disposición servicios gratuitos de asistencia lingüística. Carol al [phone].    We comply with applicable federal civil rights laws and [place] laws. We do not discriminate on the basis of race, color, national origin, age, disability, sex, sexual orientation, or gender identity.            Thank you!     Thank you for choosing [facility]   [place]  for your care. Our goal is always to provide you with excellent care. Hearing back from our patients is one way we can continue to improve our services. Please take a few minutes to complete the written survey that you may receive in the mail after your visit with us. Thank you!             Your Updated Medication List - Protect others around you: Learn how to safely use, store and throw away your medicines at www.disposemymeds.org.          This list is accurate as of 3/14/18  3:51 PM.  Always use your most recent med list.                   Brand Name Dispense Instructions for use Diagnosis    IBUPROFEN PO      Take 200 mg by mouth daily 4 tablets daily        zolpidem 5 MG tablet    AMBIEN    1 tablet    Take tablet by mouth 15 minutes prior to sleep, for Sleep Study

## 2018-03-14 NOTE — LETTER
3/14/2018    Ga Ken Jr, MD  [address] [place] [address]    RE: Madyson Murray       Dear Colleague,    I had the pleasure of seeing Madyson Murray in the [facility].    HPI and Plan:   I had the pleasure of seeing Madyson Murray in cardiology consultation on request of Dr. Ken for pulmonary hypertension.    Madyson is a pleasant 62-year-old female.  She has a past medical history of weight issues and some leg edema.  She has been using leg stockings that were given to her after her knee surgery.  She was seen by Dr. Ken recently and he heard a murmur and that prompted an echocardiogram.  Echocardiogram reveals normal LV systolic dysfunction with sigmoid septum.  There was evidence of mild to moderate tricuspid regurgitation and mild pulmonary hypertension with RVSP of 37+ right atrial pressure.  Inferior vena cava was normal in size with normal collapsibility.  The right ventricle was normal in size with normal function.  I did review the echocardiogram myself.  The septal wall thickness appears to be somewhat overestimated but it appears to be a sigmoid septum.    Patient has 15-pack-year history of smoking but none recently.  She has quit several years ago.  She does have history of snoring and inadequate sleep as well as daytime drowsiness and therefore has been referred for a sleep study which has been scheduled for end of March.    Her blood pressures are intermittently elevated.  However, there is no formal diagnosis of hypertension.  She does have some joint pains and therefore takes Motrin practically every day.    She denies chest pain.  There is no orthopnea or PND.    Her weight issues are not new but she is not a person who likes to do calorie counting.  She lives alone with her dog.  She was not interested at this time to be referred to a dietitian.    Physical examination revealed blood pressure 144/75, pulse 82/min regular.  Cardiac examination as  described below.    An EKG was done today and reviewed by me.  It reveals sinus rhythm without ischemic changes.     Impression  1.  Pulmonary hypertension  At this time, her pulmonary hypertension appears to be more secondary to elevated blood pressure untreated hypertension, sleep apnea and possibly diastolic dysfunction.  I agree with proceeding with sleep study.  I would also suggest doing a 24 hour ambulatory blood pressure instrument to see if there is evidence of hypertension and if confirmed, should be treated aggressively.  She does follow a low-salt diet.  Weight loss would be useful and she understands that.  Given her history of smoking, there may be an element of lung parenchymal disease but clinically with only 15-pack-year history of smoking and  based on lung auscultatory findings, it is less likely.    2.  Sleep apnea, will proceed with sleep study.    3.  Leg edema  This may be related to venous insufficiency.  Will start with venous incompetency testing.  If abnormal, we should get her prescription compression stockings.  If those fail, she may be a candidate for venous ablation.    4.  We talked at length about decreasing usage of Motrin only on as-needed basis and using instead extra strength Tylenol which is allowed up to less than 3 g per day.    I will see her back in follow-up after the 24 hour ambulatory blood pressure instrument and venous ultrasound is completed.  I will also review the sleep study results with her at that time.    Thank you for allowing us to participate in the care of this nice patient.    Sincerely,    Jaden Forde MD    Orders Placed This Encounter   Procedures     US Venous Competency Bilateral     Follow-Up with Cardiologist     24 Hour Blood Pressure Monitor - Adult       No orders of the defined types were placed in this encounter.      There are no discontinued medications.      Encounter Diagnoses   Name Primary?     Morbid obesity (H) Yes     Pulmonary  hypertension      Non-rheumatic tricuspid valve insufficiency      Obstructive sleep apnea syndrome      Venous (peripheral) insufficiency      Elevated blood pressure reading without diagnosis of hypertension        CURRENT MEDICATIONS:  Current Outpatient Prescriptions   Medication Sig Dispense Refill     IBUPROFEN PO Take 200 mg by mouth daily 4 tablets daily       zolpidem (AMBIEN) 5 MG tablet Take tablet by mouth 15 minutes prior to sleep, for Sleep Study 1 tablet 0       ALLERGIES     Allergies   Allergen Reactions     Liquid Adhesive        PAST MEDICAL HISTORY:  Past Medical History:   Diagnosis Date     Pulmonary hypertension      Tricuspid regurgitation        PAST SURGICAL HISTORY:  Past Surgical History:   Procedure Laterality Date     ORTHOPEDIC SURGERY      2010 and 2012       FAMILY HISTORY:  Family History   Problem Relation Age of Onset     Hypertension Mother      DIABETES Father      Hypertension Father      Parkinsonism Father      DIABETES Sister      Hypertension Sister      Sleep Apnea Sister      Sleep Apnea Brother        SOCIAL HISTORY:  Social History     Social History     Marital status:      Spouse name: N/A     Number of children: N/A     Years of education: N/A     Social History Main Topics     Smoking status: Former Smoker     Packs/day: 1.00     Years: 15.00     Smokeless tobacco: Never Used      Comment: quit around age 32     Alcohol use Yes      Comment: 2-4 drinks couple times per week     Drug use: No     Sexual activity: Yes     Other Topics Concern     None     Social History Narrative       Review of Systems:  Skin:  Negative       Eyes:  Positive for visual blurring;glasses    ENT:  Negative      Respiratory:  Positive for cough;shortness of breath     Cardiovascular:  Negative      Gastroenterology: Negative      Genitourinary:  Negative      Musculoskeletal:  Positive for back pain    Neurologic:  Positive for headaches    Psychiatric:  Negative     "  Heme/Lymph/Imm:  Negative      Endocrine:  Negative        Physical Exam:  Vitals: /75  Pulse 82  Ht 1.702 m (5' 7\")  Wt 132.5 kg (292 lb)  SpO2 98%  BMI 45.73 kg/m2    Constitutional:    obese      Skin:  warm and dry to the touch          Head:  normocephalic        Eyes:  pupils equal and round        Lymph:No Cervical lymphadenopathy present     ENT:           Neck:  carotid pulses are full and equal bilaterally        Respiratory:  clear to auscultation         Cardiac: regular rhythm;normal S1 and S2       systolic ejection murmur;grade 2;RUSB                                                 GI:    obese      Extremities and Muscular Skeletal:      1+;trace;bilateral LE edema          Neurological:  no gross motor deficits        Psych:  Alert and Oriented x 3        Thank you for allowing me to participate in the care of your patient.    Sincerely,     Jaden Forde MD     [facility]    " Statement Selected

## 2018-03-14 NOTE — PROGRESS NOTES
HPI and Plan:   I had the pleasure of seeing Madyson Murray in cardiology consultation on request of Dr. Ken for pulmonary hypertension.    Madyson is a pleasant 62-year-old female.  She has a past medical history of weight issues and some leg edema.  She has been using leg stockings that were given to her after her knee surgery.  She was seen by Dr. Ken recently and he heard a murmur and that prompted an echocardiogram.  Echocardiogram reveals normal LV systolic dysfunction with sigmoid septum.  There was evidence of mild to moderate tricuspid regurgitation and mild pulmonary hypertension with RVSP of 37+ right atrial pressure.  Inferior vena cava was normal in size with normal collapsibility.  The right ventricle was normal in size with normal function.  I did review the echocardiogram myself.  The septal wall thickness appears to be somewhat overestimated but it appears to be a sigmoid septum.    Patient has 15-pack-year history of smoking but none recently.  She has quit several years ago.  She does have history of snoring and inadequate sleep as well as daytime drowsiness and therefore has been referred for a sleep study which has been scheduled for end of March.    Her blood pressures are intermittently elevated.  However, there is no formal diagnosis of hypertension.  She does have some joint pains and therefore takes Motrin practically every day.    She denies chest pain.  There is no orthopnea or PND.    Her weight issues are not new but she is not a person who likes to do calorie counting.  She lives alone with her dog.  She was not interested at this time to be referred to a dietitian.    Physical examination revealed blood pressure 144/75, pulse 82/min regular.  Cardiac examination as described below.    An EKG was done today and reviewed by me.  It reveals sinus rhythm without ischemic changes.     Impression  1.  Pulmonary hypertension  At this time, her pulmonary hypertension appears to be more  secondary to elevated blood pressure untreated hypertension, sleep apnea and possibly diastolic dysfunction.  I agree with proceeding with sleep study.  I would also suggest doing a 24 hour ambulatory blood pressure instrument to see if there is evidence of hypertension and if confirmed, should be treated aggressively.  She does follow a low-salt diet.  Weight loss would be useful and she understands that.  Given her history of smoking, there may be an element of lung parenchymal disease but clinically with only 15-pack-year history of smoking and  based on lung auscultatory findings, it is less likely.    2.  Sleep apnea, will proceed with sleep study.    3.  Leg edema  This may be related to venous insufficiency.  Will start with venous incompetency testing.  If abnormal, we should get her prescription compression stockings.  If those fail, she may be a candidate for venous ablation.    4.  We talked at length about decreasing usage of Motrin only on as-needed basis and using instead extra strength Tylenol which is allowed up to less than 3 g per day.    I will see her back in follow-up after the 24 hour ambulatory blood pressure instrument and venous ultrasound is completed.  I will also review the sleep study results with her at that time.    Thank you for allowing us to participate in the care of this nice patient.    Sincerely,    Jaden Forde MD    Orders Placed This Encounter   Procedures     US Venous Competency Bilateral     Follow-Up with Cardiologist     24 Hour Blood Pressure Monitor - Adult       No orders of the defined types were placed in this encounter.      There are no discontinued medications.      Encounter Diagnoses   Name Primary?     Morbid obesity (H) Yes     Pulmonary hypertension      Non-rheumatic tricuspid valve insufficiency      Obstructive sleep apnea syndrome      Venous (peripheral) insufficiency      Elevated blood pressure reading without diagnosis of hypertension        CURRENT  "MEDICATIONS:  Current Outpatient Prescriptions   Medication Sig Dispense Refill     IBUPROFEN PO Take 200 mg by mouth daily 4 tablets daily       zolpidem (AMBIEN) 5 MG tablet Take tablet by mouth 15 minutes prior to sleep, for Sleep Study 1 tablet 0       ALLERGIES     Allergies   Allergen Reactions     Liquid Adhesive        PAST MEDICAL HISTORY:  Past Medical History:   Diagnosis Date     Pulmonary hypertension      Tricuspid regurgitation        PAST SURGICAL HISTORY:  Past Surgical History:   Procedure Laterality Date     ORTHOPEDIC SURGERY      2010 and 2012       FAMILY HISTORY:  Family History   Problem Relation Age of Onset     Hypertension Mother      DIABETES Father      Hypertension Father      Parkinsonism Father      DIABETES Sister      Hypertension Sister      Sleep Apnea Sister      Sleep Apnea Brother        SOCIAL HISTORY:  Social History     Social History     Marital status:      Spouse name: N/A     Number of children: N/A     Years of education: N/A     Social History Main Topics     Smoking status: Former Smoker     Packs/day: 1.00     Years: 15.00     Smokeless tobacco: Never Used      Comment: quit around age 32     Alcohol use Yes      Comment: 2-4 drinks couple times per week     Drug use: No     Sexual activity: Yes     Other Topics Concern     None     Social History Narrative       Review of Systems:  Skin:  Negative       Eyes:  Positive for visual blurring;glasses    ENT:  Negative      Respiratory:  Positive for cough;shortness of breath     Cardiovascular:  Negative      Gastroenterology: Negative      Genitourinary:  Negative      Musculoskeletal:  Positive for back pain    Neurologic:  Positive for headaches    Psychiatric:  Negative      Heme/Lymph/Imm:  Negative      Endocrine:  Negative        Physical Exam:  Vitals: /75  Pulse 82  Ht 1.702 m (5' 7\")  Wt 132.5 kg (292 lb)  SpO2 98%  BMI 45.73 kg/m2    Constitutional:    obese      Skin:  warm and dry to the " touch          Head:  normocephalic        Eyes:  pupils equal and round        Lymph:No Cervical lymphadenopathy present     ENT:           Neck:  carotid pulses are full and equal bilaterally        Respiratory:  clear to auscultation         Cardiac: regular rhythm;normal S1 and S2       systolic ejection murmur;grade 2;RUSB                                                 GI:    obese      Extremities and Muscular Skeletal:      1+;trace;bilateral LE edema          Neurological:  no gross motor deficits        Psych:  Alert and Oriented x 3        CC  Ga Ken Jr., MD  [address] [place] [address]

## 2018-03-14 NOTE — NURSING NOTE
"Chief Complaint   Patient presents with     New Patient     Pulmonary Hypertension       Initial /75  Pulse 82  Ht 1.702 m (5' 7\")  Wt 132.5 kg (292 lb)  SpO2 98%  BMI 45.73 kg/m2 Estimated body mass index is 45.73 kg/(m^2) as calculated from the following:    Height as of this encounter: 1.702 m (5' 7\").    Weight as of this encounter: 132.5 kg (292 lb).  Medication Reconciliation: complete  "

## 2018-03-16 DIAGNOSIS — Z12.11 SCREEN FOR COLON CANCER: ICD-10-CM

## 2018-03-16 NOTE — PROGRESS NOTES
Clinic Care Coordination Contact  [facility]/Voicemail    Referral Source: PCP  Clinical Data: Care Coordinator Outreach  Outreach attempted x 3.    Plan:   Care Coordinator will mail out care coordination introduction letter with care coordinator contact information and explanation of care coordination services.   Will include resources for dental care in the mail.   Care Coordinator will do no further outreaches at this time.    Aurora Steward, RN  [facility] Care Coordinator - [place] and [place] Locations   Direct:  [phone] (voicemail available)

## 2018-03-18 LAB — HEMOCCULT STL QL IA: NEGATIVE

## 2018-03-18 NOTE — PROGRESS NOTES
Ms. Murray,    -FIT test (screening test for colon cancer) was normal. ADVISE - rechecking in 1 year.    If you have further questions about the interpretation of your labs, [email or web address] is a good website to check out for further information.    Please contact the clinic if you have additional questions.  Thank you.    Sincerely,    Ga Ken MD

## 2018-03-20 ENCOUNTER — HOSPITAL ENCOUNTER (OUTPATIENT)
Dept: CARDIOLOGY | Facility: CLINIC | Age: 63
Discharge: HOME | End: 2018-03-20
Attending: INTERNAL MEDICINE | Admitting: INTERNAL MEDICINE
Payer: COMMERCIAL

## 2018-03-20 DIAGNOSIS — R03.0 ELEVATED BLOOD PRESSURE READING WITHOUT DIAGNOSIS OF HYPERTENSION: ICD-10-CM

## 2018-03-20 PROCEDURE — 93790 AMBL BP MNTR W/SW I&R: CPT | Performed by: INTERNAL MEDICINE

## 2018-03-20 PROCEDURE — 93786 AMBL BP MNTR W/SW REC ONLY: CPT

## 2018-03-21 ENCOUNTER — RADIANT APPOINTMENT (OUTPATIENT)
Dept: VASCULAR ULTRASOUND | Facility: CLINIC | Age: 63
End: 2018-03-21
Attending: INTERNAL MEDICINE
Payer: COMMERCIAL

## 2018-03-21 DIAGNOSIS — I87.2 VENOUS (PERIPHERAL) INSUFFICIENCY: ICD-10-CM

## 2018-03-21 PROCEDURE — 93970 EXTREMITY STUDY: CPT | Mod: 26 | Performed by: INTERNAL MEDICINE

## 2018-03-27 ENCOUNTER — THERAPY VISIT (OUTPATIENT)
Dept: SLEEP MEDICINE | Facility: CLINIC | Age: 63
End: 2018-03-27
Payer: COMMERCIAL

## 2018-03-27 DIAGNOSIS — Z82.0 FAMILY HISTORY OF SLEEP APNEA: ICD-10-CM

## 2018-03-27 DIAGNOSIS — G47.30 OBSERVED SLEEP APNEA: ICD-10-CM

## 2018-03-27 DIAGNOSIS — R06.83 SNORING: ICD-10-CM

## 2018-03-27 DIAGNOSIS — R03.0 ELEVATED BLOOD PRESSURE READING WITHOUT DIAGNOSIS OF HYPERTENSION: ICD-10-CM

## 2018-03-27 PROCEDURE — 95811 POLYSOM 6/>YRS CPAP 4/> PARM: CPT | Performed by: PSYCHIATRY & NEUROLOGY

## 2018-03-27 NOTE — MR AVS SNAPSHOT
After Visit Summary   3/27/2018    Madyson Murray    MRN: [id number]           Patient Information     Date Of Birth          1955        Visit Information        Provider Department      3/27/2018 8:30 PM BED 2 SH SLEEP [facility]        Today's Diagnoses     Snoring        Observed sleep apnea        BMI 45.0-49.9, adult (H)        Elevated blood pressure reading without diagnosis of hypertension        Family history of sleep apnea          Care Instructions    [facility]    1. Your sleep study will be reviewed by a sleep physician within the next few days.     2. Please follow up in the sleep clinic as scheduled, or, make an appointment with your sleep provider to be seen within two weeks to discuss the results of the sleep study.    3. If you have any questions or problems with your treatment plan, please contact your sleep clinic provider at [phone] to further manage your condition.    4. Please review your attached medication list, and, at your follow-up appointment advise your sleep clinic provider about any changes.    5. Go to [email or web address] for more information about your sleep problems.    ONEYDA Garcia  March 28, 2018                  Follow-ups after your visit        Your next 10 appointments already scheduled     Apr 16, 2018  3:30 PM CDT   Return Sleep Patient with Bennett Ezra Goltz, PA-C   [facility] ([facility])    [address] [place] [id number] [phone]            May 23, 2018  3:45 PM CDT   Return Visit with Jaden Forde MD   [facility] ([address] [id number] [phone]              Who to contact     If you have questions or need follow up information about today's clinic visit or your schedule please contact  [facility] directly at [phone].  Normal or non-critical lab and imaging results will be communicated to you by MyChart, letter or phone within 4 business days after the clinic has received the results. If you do not hear from us within 7 days, please contact the clinic through MyChart or phone. If you have a critical or abnormal lab result, we will notify you by phone as soon as possible.  Submit refill requests through [employer] or call your pharmacy and they will forward the refill request to us. Please allow 3 business days for your refill to be completed.          Additional Information About Your Visit        [employer] Information     [employer] gives you secure access to your electronic health record. If you see a primary care provider, you can also send messages to your care team and make appointments. If you have questions, please call your primary care clinic.  If you do not have a primary care provider, please call [phone] and they will assist you.        Care EveryWhere ID     This is your Care EveryWhere ID. This could be used by other organizations to access your [facility] medical records  [id number]         Blood Pressure from Last 3 Encounters:   03/14/18 144/75   03/05/18 136/84   02/15/18 140/87    Weight from Last 3 Encounters:   03/14/18 132.5 kg (292 lb)   03/05/18 132.5 kg (292 lb)   02/15/18 135.2 kg (298 lb)              We Performed the Following     Comprehensive Sleep Study        Primary Care Provider Office Phone # Fax #    Ga Ken Jr., [phone] [address]  [place] [address]        Equal Access to Services     [facility]: Hadii aad ku hadasho Soomaali, waaxda luqadaha, qaybta kaalmada chase brannon . So [facility] [phone].    ATENCIÓN: Si habla español, tiene a israel disposición servicios gratuitos de asistencia lingüística. Llame al [phone].    We comply with applicable federal civil  rights laws and [place] laws. We do not discriminate on the basis of race, color, national origin, age, disability, sex, sexual orientation, or gender identity.            Thank you!     Thank you for choosing [facility]  for your care. Our goal is always to provide you with excellent care. Hearing back from our patients is one way we can continue to improve our services. Please take a few minutes to complete the written survey that you may receive in the mail after your visit with us. Thank you!             Your Updated Medication List - Protect others around you: Learn how to safely use, store and throw away your medicines at www.disposemymeds.org.          This list is accurate as of 3/27/18 11:59 PM.  Always use your most recent med list.                   Brand Name Dispense Instructions for use Diagnosis    IBUPROFEN PO      Take 200 mg by mouth daily 4 tablets daily        zolpidem 5 MG tablet    AMBIEN    1 tablet    Take tablet by mouth 15 minutes prior to sleep, for Sleep Study

## 2018-03-28 NOTE — PROCEDURES
" SLEEP STUDY INTERPRETATION  SPLIT NIGHT STUDY      Patient: CARRI ANDERS  YOB: 1955  Study Date: 3/27/2018  MRN: [id number]  Referring Provider: Jr Ken MD, Stephen  Ordering Provider: Goltz, PA-C, Bennett    Indications for Polysomnography: The patient is a 62 y old Female who is 5' 7\" and weighs 298.0 lbs. Her BMI is 46.8, [place] sleepiness scale 17.0 and neck circumference is 41.0 cm. Relevant medical history includes snoring, witnessed apneas. A diagnostic polysomnogram was performed to evaluate for sleep apnea. After 125.5 minutes of sleep time the patient exhibited sufficient respiratory events qualifying her for a CPAP trial which was then initiated.    Polysomnogram Data: A full night polysomnogram recorded the standard physiologic parameters including EEG, EOG, EMG, ECG, nasal and oral airflow. Respiratory parameters of chest and abdominal movements were recorded with respiratory inductance plethysmography. Oxygen saturation was recorded by pulse oximetry.  Hypopnea scoring rule used: 1B 4%    Diagnostic PSG  Sleep Architecture: Sleep fragmentation  The total recording time of the polysomnogram was 149.9 minutes. The total sleep time was 125.5 minutes. Sleep latency was 5.4 minutes. REM latency was 112.5 minutes. Arousal index was 34.9 arousals per hour. Sleep efficiency was 83.7%. Wake after sleep onset was 14.0 minutes. The patient spent 6.4% of total sleep time in Stage N1, 54.2% in Stage N2, 26.7% in Stage N3, and 12.7% in REM. Time in REM supine was 16.0 minutes.    Respiration: Severe DAE with hypoxemia    Events ? The polysomnogram revealed a presence of 29 obstructive, - central, and - mixed apneas resulting in an apnea index of 13.9 events per hour. There were 53 obstructive hypopneas and - central hypopneas resulting in an obstructive hypopnea index of 25.3 and central hypopnea index of - events per hour. The combined apnea/hypopnea index was 39.2 events per hour (central " apnea/hypopnea index was - events per hour).  The REM AHI was 71.3 events per hour. The supine AHI was 40.5 events per hour. The RERA index was 2.4 events per hour. The RDI was 41.6 events per hour.    Snoring - was reported as mild.    Respiratory rate and pattern - was notable for normal respiratory rate and pattern.    Sustained Sleep Associated Hypoventilation - Transcutaneous carbon dioxide monitoring was used, however significant hypoventilation was not present with a maximum change from 42.1 to 48.5 mmHg and 0 minutes at or greater than 55 mmHg.    Sleep Associated Hypoxemia - (Greater than 5 minutes O2 sat at or below 88%) was present. Baseline oxygen saturation was 89.8%. Lowest oxygen saturation was 64.0%. Time spent less than or equal to 88% was 31.0 minutes. Time spent less than or equal to 89% was 46.1 minutes.     Treatment PSG  Sleep Architecture: Decreased sleep fragmentation  At 12:20:43 AM the patient was placed on PAP treatment and was titrated at pressures ranging from CPAP 5 cmH2O up to CPAP 13 cmH2O. The total recording time of the treatment portion of the study was 275.3 minutes. The total sleep time was 239.5 minutes. During the treatment portion of the study the sleep latency was 4.0 minutes. REM latency was 47.0 minutes. Arousal index was 8.5 arousals per hour. Sleep efficiency was 87.0%. Wake after sleep onset was 23.5 minutes. The patient spent 3.3% of total sleep time in Stage N1, 37.6% in Stage N2, 21.3% in Stage N3, and 37.8% in REM. Time in REM supine was 90.5 minutes.     Respiration: Sleep disordered breathing noted on the baseline portion of the study was nearly fully resolved with CPAP therapy.  Of note the patient did have REM supine during this portion of the study.      The final pressure was CPAP 13 cmH2O with an AHI of 7.3 events per hour. Time in REM supine on final pressure was 22.0 minutes.     Movement Activity: The patient had elevated periodic limb movements (PLMs)  during the baseline portion of the study. The majority of these were not associated with cortical arousal.    Periodic Limb Movements  o During the diagnostic portion of the study, there were 61 PLMs recorded. The PLM index was 29.2 movements per hour. The PLM Arousal Index was 2.9 per hour.  o During the treatment portion of the study, there were 44 PLMs recorded. The PLM index was 11.0 movements per hour. The PLM Arousal Index was 0.3 per hour.    REM EMG Activity - Excessive muscle activity was not present.    Nocturnal Behavior - Abnormal sleep related behaviors were not noted.    Bruxism - None apparent.    Cardiac Summary: Limited one lead EKG which demonstrated predominantly narrow QRS complexes preceded by P waves suggestive of sinus rhythm.  Occasional wide QRS complexes suggestive of PVC s.  During the diagnostic portion of the study, the average pulse rate was 65.5 bpm. The minimum pulse rate was 54.9 bpm while the maximum pulse rate was 73.2 bpm.    During the treatment portion of the study, the average pulse rate was 58.7 bpm. The minimum pulse rate was 50.7 bpm while the maximum pulse rate was 73.2 bpm.     Assessment/Recommendations:     Severe DAE with hypoxemia   o Sleep disordered breathing noted on the baseline portion of the study was nearly fully resolved with CPAP therapy.  Of note the patient did have REM supine during this portion of the study.    o Recommend autotitration CPAP and close clinical follow up.     The patient had elevated periodic limb movements (PLMs) during the baseline portion of the study. The majority of these were not associated with cortical arousal.    Advice regarding the risks of drowsy driving.    Suggest optimizing sleep schedule and avoiding sleep deprivation.    Weight management     Diagnostic Code(s): G47.33, G47.36      Cortez Cornejo MD 3-

## 2018-03-28 NOTE — PROGRESS NOTES
Completed a split night PSG per provider order.    Preliminary AHI >30.  A final therapeutic PAP pressure was achieved.    Supine REM was seen on therapeutic pressure.    Patient reports feeling refreshed in AM.

## 2018-03-28 NOTE — PATIENT INSTRUCTIONS
[place] [facility]    1. Your sleep study will be reviewed by a sleep physician within the next few days.     2. Please follow up in the sleep clinic as scheduled, or, make an appointment with your sleep provider to be seen within two weeks to discuss the results of the sleep study.    3. If you have any questions or problems with your treatment plan, please contact your sleep clinic provider at [phone] to further manage your condition.    4. Please review your attached medication list, and, at your follow-up appointment advise your sleep clinic provider about any changes.    5. Go to [email or web address] for more information about your sleep problems.    Angela Davies, DEDEGT  March 28, 2018

## 2018-03-29 LAB — SLPCOMP: NORMAL

## 2018-04-04 ENCOUNTER — TELEPHONE (OUTPATIENT)
Dept: CARDIOLOGY | Facility: CLINIC | Age: 63
End: 2018-04-04

## 2018-04-05 NOTE — TELEPHONE ENCOUNTER
Elevated blood pressures. Start lotrel 2.5/10mg per day and bmp at next visit. Follow low salt diet

## 2018-04-05 NOTE — TELEPHONE ENCOUNTER
Call out to Pt to discuss BP results, and medication recommendation.  Dr Forde would also like her to see Vida in vein clinic.     Notes Recorded by Jaden Forde MD on 4/5/2018 at 8:43 AM  See emelina in vein clinic to discuss compression therapy. Can see her and cancel appt with me in May. Vida can fu on BP also.  ------     DAYO Gonsalez RN

## 2018-04-16 ENCOUNTER — OFFICE VISIT (OUTPATIENT)
Dept: SLEEP MEDICINE | Facility: CLINIC | Age: 63
End: 2018-04-16
Payer: COMMERCIAL

## 2018-04-16 VITALS
RESPIRATION RATE: 16 BRPM | DIASTOLIC BLOOD PRESSURE: 72 MMHG | WEIGHT: 293 LBS | SYSTOLIC BLOOD PRESSURE: 142 MMHG | OXYGEN SATURATION: 95 % | HEART RATE: 81 BPM | BODY MASS INDEX: 45.99 KG/M2 | HEIGHT: 67 IN

## 2018-04-16 DIAGNOSIS — G47.33 OSA (OBSTRUCTIVE SLEEP APNEA): Primary | ICD-10-CM

## 2018-04-16 DIAGNOSIS — G47.34 NOCTURNAL HYPOXEMIA: ICD-10-CM

## 2018-04-16 PROCEDURE — 99214 OFFICE O/P EST MOD 30 MIN: CPT | Performed by: PHYSICIAN ASSISTANT

## 2018-04-16 NOTE — MR AVS SNAPSHOT
After Visit Summary   4/16/2018    Madyson Murray    MRN: [id number]           Patient Information     Date Of Birth          1955        Visit Information        Provider Department      4/16/2018 3:30 PM Goltz, Bennett Ezra, PA-C [facility]        Today's Diagnoses     DAE (obstructive sleep apnea)    -  1    Nocturnal hypoxemia          Care Instructions    You will be provided with an auto-titrating CPAP with a pressure range of 13-16 cm with heated humidity to limit nasal congestion. Adjust the heat level on humidifier to find a setting that prevents dry nose but does not cause condensation in the hose or mask. Use distilled water in the humidifier.  The CPAP has a ramp function that starts the pressure lower than your prescribed pressure and gradually increases it over a number of minutes.  This may make it easier to fall asleep.    Try to use the CPAP every-night, all night (minimum of 4 hours). Many insurances require that we prove you are using the CPAP at least 4 hours on at least 70% of nights over a 30 day period. We have 90 days to meet those criteria.            Discussed weight management and the impact of weight gain on sleep apnea.  Let me know if you snore or feel the pressure is too high.    You can get new supplies (mask, hose and filter) for your CPAP every 3-6 months, covered by insurance. You do not need to get supplies that often, but they are available if you would like them.  You may exchange the mask once within the first month if you feel the initial mask does not fit well.  Contact your medical equipment provider for equipment issues.  Please let me know if you have any return of snoring, daytime sleepiness or poor sleep quality. We will want to make sure your CPAP is adequately treating your apnea.  There is a website called CPAP.com that has accessories that may make CPAP use easier. Please visit it at your convenience.  Our phone number is  [phone].    Follow-up 2 month.  Bring your CPAP machine with you to the follow up appointment.    Your BMI is Body mass index is 45.88 kg/(m^2).  Weight management is a personal decision.  If you are interested in exploring weight loss strategies, the following discussion covers the approaches that may be successful. Body mass index (BMI) is one way to tell whether you are at a healthy weight, overweight, or obese. It measures your weight in relation to your height.  A BMI of 18.5 to 24.9 is in the healthy range. A person with a BMI of 25 to 29.9 is considered overweight, and someone with a BMI of 30 or greater is considered obese. More than two-thirds of American adults are considered overweight or obese.  Being overweight or obese increases the risk for further weight gain. Excess weight may lead to heart disease and diabetes.  Creating and following plans for healthy eating and physical activity may help you improve your health.  Weight control is part of healthy lifestyle and includes exercise, emotional health, and healthy eating habits. Careful eating habits lifelong are the mainstay of weight control. Though there are significant health benefits from weight loss, long-term weight loss with diet alone may be very difficult to achieve- studies show long-term success with dietary management in less than 10% of people. Attaining a healthy weight may be especially difficult to achieve in those with severe obesity. In some cases, medications, devices and surgical management might be considered.  What can you do?  If you are overweight or obese and are interested in methods for weight loss, you should discuss this with your provider.     Consider reducing daily calorie intake by 500 calories.     Keep a food journal.     Avoiding skipping meals, consider cutting portions instead.    Diet combined with exercise helps maintain muscle while optimizing fat loss. Strength training is particularly important for  building and maintaining muscle mass. Exercise helps reduce stress, increase energy, and improves fitness. Increasing exercise without diet control, however, may not burn enough calories to loose weight.       Start walking three days a week 10-20 minutes at a time    Work towards walking thirty minutes five days a week     Eventually, increase the speed of your walking for 1-2 minutes at time    In addition, we recommend that you review healthy lifestyles and methods for weight loss available through the National Institutes of Health patient information sites:  http://win.niddk.nih.gov/publications/index.htm    And look into health and wellness programs that may be available through your health insurance provider, employer, local community center, or ally club.    Weight management plan: Patient was referred to their PCP to discuss a diet and exercise plan.              Follow-ups after your visit        Follow-up notes from your care team     Return in about 2 months (around 6/16/2018) for CPAP compliance recheck.      Your next 10 appointments already scheduled     May 23, 2018  3:45 PM CDT   Return Visit with Jaden Forde MD   [address] [address] [phone]              Who to contact     If you have questions or need follow up information about today's clinic visit or your schedule please contact [facility] directly at [phone].  Normal or non-critical lab and imaging results will be communicated to you by MyChart, letter or phone within 4 business days after the clinic has received the results. If you do not hear from us within 7 days, please contact the clinic through MyChart or phone. If you have a critical or abnormal lab result, we will notify you by phone as soon as possible.  Submit refill requests through anywayanyday or call your pharmacy and they  "will forward the refill request to us. Please allow 3 business days for your refill to be completed.          Additional Information About Your Visit        [employer]Career[employer] Information     [email or web address] gives you secure access to your electronic health record. If you see a primary care provider, you can also send messages to your care team and make appointments. If you have questions, please call your primary care clinic.  If you do not have a primary care provider, please call [phone] and they will assist you.        Care EveryWhere ID     This is your Care EveryWhere ID. This could be used by other organizations to access your [place] medical records  [id number]        Your Vitals Were     Pulse Respirations Height Pulse Oximetry BMI (Body Mass Index)       81 16 1.702 m (5' 7.01\") 95% 45.88 kg/m2        Blood Pressure from Last 3 Encounters:   04/16/18 142/72   03/14/18 144/75   03/05/18 136/84    Weight from Last 3 Encounters:   04/16/18 132.9 kg (293 lb)   03/14/18 132.5 kg (292 lb)   03/05/18 132.5 kg (292 lb)              We Performed the Following     Comprehensive DME          Today's Medication Changes          These changes are accurate as of 4/16/18  4:08 PM.  If you have any questions, ask your nurse or doctor.               Stop taking these medicines if you haven't already. Please contact your care team if you have questions.     zolpidem 5 MG tablet   Commonly known as:  AMBIEN                    Primary Care Provider Office Phone # Fax #    Ga Ken Jr., [phone] [address]  [place] [address]        Equal Access to Services     [facility]: Hadii aad torri hadasho Soomaali, waaxda luqadaha, qaybta kaalmada chase brannon . So [facility] [phone].    ATENCIÓN: Si habla español, tiene a israel disposición servicios gratuitos de asistencia lingüística. Llame al [phone].    We comply with applicable federal civil rights laws and " [place] laws. We do not discriminate on the basis of race, color, national origin, age, disability, sex, sexual orientation, or gender identity.            Thank you!     Thank you for choosing [place] [facility]  for your care. Our goal is always to provide you with excellent care. Hearing back from our patients is one way we can continue to improve our services. Please take a few minutes to complete the written survey that you may receive in the mail after your visit with us. Thank you!             Your Updated Medication List - Protect others around you: Learn how to safely use, store and throw away your medicines at www.disposemymeds.org.          This list is accurate as of 4/16/18  4:08 PM.  Always use your most recent med list.                   Brand Name Dispense Instructions for use Diagnosis    IBUPROFEN PO      Take 200 mg by mouth daily 4 tablets daily

## 2018-04-16 NOTE — NURSING NOTE
"Chief Complaint   Patient presents with     Study Results     Follow up psg       Initial /72  Pulse 81  Resp 16  Ht 1.702 m (5' 7.01\")  Wt 132.9 kg (293 lb)  SpO2 95%  BMI 45.88 kg/m2 Estimated body mass index is 45.88 kg/(m^2) as calculated from the following:    Height as of this encounter: 1.702 m (5' 7.01\").    Weight as of this encounter: 132.9 kg (293 lb).  Medication Reconciliation: complete     ESS 17  Prema Ba MA      "

## 2018-04-16 NOTE — PROGRESS NOTES
Sleep Study Follow-Up Visit:    Date on this visit: 4/16/2018    Madyson Murray comes in today for follow-up of her sleep study done on 3/27/2018 at the [facility] for daytime sleepiness and middle of the night awakenings for over 10 years, worse in the last couple of years. Her medical history is significant for morbid obesity. She also has lower extremity edema and a heart murmur.    Sleep latency 5.4 minutes with Ambien.  REM achieved.   REM latency 112.5 minutes.  Sleep efficiency 83.7%. Total sleep time 125.5 minutes.    Sleep architecture:  Stage 1, 6.4% (5%), stage 2, 54.2% (45-55%), stage 3, 26.7% (15-20%), stage REM, 12.7% (20-25%).  AHI was 39.2/hr, with desaturations down to 66%. S/He spent 46.1 minutes below 90% SpO2, 31 minutes below 89%. RDI 41.6/hr.  REM RDI 71.3/hr, consistent with severe REM DAE.  Supine RDI 41.2/hr, consistent with severe SUPINE DAE.  Periodic Limb Movement Index 29.2/hour, 2.9/hr were associated with arousals. Her CO2 ranged from 43 to 48 mmHg, not suggestive of hypoventilation.       CPAP titration:  Sleep latency 4 minutes.  REM latency 47 minutes.  Sleep efficiency 87%. Total sleep time 239.5 minutes. Sleep architecture:  Stage 1, 3.3% (5%), stage 2, 37.6% (45-55%), stage 3, 21.3% (15-20%), stage REM, 37.8% (20-25%).  CPAP was titrated to 13 cm with near complete elimination of apneas, hypopneas and desaturations. Supine REM was noted at this pressure. These findings were reviewed with the patient.    Past medical/surgical history, family history, social history, medications and allergies were reviewed.      Problem List:  Patient Active Problem List    Diagnosis Date Noted     Pulmonary hypertension 03/14/2018     Priority: Medium     Non-rheumatic tricuspid valve insufficiency 03/14/2018     Priority: Medium     Obstructive sleep apnea syndrome 03/14/2018     Priority: Medium     Venous (peripheral) insufficiency 03/14/2018     Priority: Medium     Morbid  obesity (H) 02/05/2018     Priority: Medium     CARDIOVASCULAR SCREENING; LDL GOAL LESS THAN 130 02/05/2018     Priority: Medium        Impression/Plan:    (G47.33) DAE (obstructive sleep apnea)  (primary encounter diagnosis), (G47.34) Nocturnal hypoxemia  Comment: Severe DAE with hypoxemia. CPAP 13 cm was nearly completely effective in a short period of REM supine. She did not have residual hypoxemia, but her baseline was sitting at 90% for periods of the night.  Plan: Comprehensive DME        Auto 13-16 cm, heated humidifier and compliance meter. The patient was informed of the mask exchange policy and the compliance goals. They were also informed that they would be followed by the sleep therapy management team during the first month of CPAP use.     She will follow up with me in about 2 month(s).     Twenty-five minutes spent with patient, all of which were spent face-to-face counseling, consulting, coordinating plan of care.      Bennett Goltz, PA-C    CC: Ga Ken Jr, MD

## 2018-04-16 NOTE — PATIENT INSTRUCTIONS
You will be provided with an auto-titrating CPAP with a pressure range of 13-16 cm with heated humidity to limit nasal congestion. Adjust the heat level on humidifier to find a setting that prevents dry nose but does not cause condensation in the hose or mask. Use distilled water in the humidifier.  The CPAP has a ramp function that starts the pressure lower than your prescribed pressure and gradually increases it over a number of minutes.  This may make it easier to fall asleep.    Try to use the CPAP every-night, all night (minimum of 4 hours). Many insurances require that we prove you are using the CPAP at least 4 hours on at least 70% of nights over a 30 day period. We have 90 days to meet those criteria.            Discussed weight management and the impact of weight gain on sleep apnea.  Let me know if you snore or feel the pressure is too high.    You can get new supplies (mask, hose and filter) for your CPAP every 3-6 months, covered by insurance. You do not need to get supplies that often, but they are available if you would like them.  You may exchange the mask once within the first month if you feel the initial mask does not fit well.  Contact your medical equipment provider for equipment issues.  Please let me know if you have any return of snoring, daytime sleepiness or poor sleep quality. We will want to make sure your CPAP is adequately treating your apnea.  There is a website called CPAP.com that has accessories that may make CPAP use easier. Please visit it at your convenience.  Our phone number is [phone].    Follow-up 2 month.  Bring your CPAP machine with you to the follow up appointment.    Your BMI is Body mass index is 45.88 kg/(m^2).  Weight management is a personal decision.  If you are interested in exploring weight loss strategies, the following discussion covers the approaches that may be successful. Body mass index (BMI) is one way to tell whether you are at a healthy weight,  overweight, or obese. It measures your weight in relation to your height.  A BMI of 18.5 to 24.9 is in the healthy range. A person with a BMI of 25 to 29.9 is considered overweight, and someone with a BMI of 30 or greater is considered obese. More than two-thirds of American adults are considered overweight or obese.  Being overweight or obese increases the risk for further weight gain. Excess weight may lead to heart disease and diabetes.  Creating and following plans for healthy eating and physical activity may help you improve your health.  Weight control is part of healthy lifestyle and includes exercise, emotional health, and healthy eating habits. Careful eating habits lifelong are the mainstay of weight control. Though there are significant health benefits from weight loss, long-term weight loss with diet alone may be very difficult to achieve- studies show long-term success with dietary management in less than 10% of people. Attaining a healthy weight may be especially difficult to achieve in those with severe obesity. In some cases, medications, devices and surgical management might be considered.  What can you do?  If you are overweight or obese and are interested in methods for weight loss, you should discuss this with your provider.     Consider reducing daily calorie intake by 500 calories.     Keep a food journal.     Avoiding skipping meals, consider cutting portions instead.    Diet combined with exercise helps maintain muscle while optimizing fat loss. Strength training is particularly important for building and maintaining muscle mass. Exercise helps reduce stress, increase energy, and improves fitness. Increasing exercise without diet control, however, may not burn enough calories to loose weight.       Start walking three days a week 10-20 minutes at a time    Work towards walking thirty minutes five days a week     Eventually, increase the speed of your walking for 1-2 minutes at time    In  addition, we recommend that you review healthy lifestyles and methods for weight loss available through the National Institutes of Health patient information sites:  http://win.niddk.nih.gov/publications/index.htm    And look into health and wellness programs that may be available through your health insurance provider, employer, local community center, or ally club.    Weight management plan: Patient was referred to their PCP to discuss a diet and exercise plan.

## 2018-04-27 ENCOUNTER — DOCUMENTATION ONLY (OUTPATIENT)
Dept: SLEEP MEDICINE | Facility: CLINIC | Age: 63
End: 2018-04-27

## 2018-04-27 NOTE — PROGRESS NOTES
Patient was offered choice of vendor and chose [facility].  Patient Madyson Murray was set up at [place] on April 27, 2018. Patient received a [employer] DreamStation Auto. Pressures were set at 13-16 cm H2O.   Patient s ramp is 7 cm H2O for 20 min and FLEX/EPR is A Flex.  Patient received a [employer] Mask name: Dreamwear  Full Face mask Size Medium, heated tubing and heated humidifier.  Patient is enrolled in the STM Program and does not need to meet compliance.

## 2018-04-30 ENCOUNTER — DOCUMENTATION ONLY (OUTPATIENT)
Dept: SLEEP MEDICINE | Facility: CLINIC | Age: 63
End: 2018-04-30

## 2018-04-30 NOTE — PROGRESS NOTES
3 DAY STM VISIT    Diagnostic AHI: 39.2    Patient contacted for 3 day STM visit  Subjective measures:  Pt states that her mask is leaking quite a bit and is very dry.  She doesn't feel like her mask is fitting her properly.  She made an appointment for a mask exchange.     Device type: Auto-CPAP  PAP settings from order::  CPAP min 13 cm  H20       CPAP max 16 cm  H20  Device settings from machine      Min CPAP 13            Max CPAP 16      Assessment: Nightly usage over four hours.  Action plan: Pt to have f/u 14 day STM visit.

## 2018-05-14 ENCOUNTER — DOCUMENTATION ONLY (OUTPATIENT)
Dept: SLEEP MEDICINE | Facility: CLINIC | Age: 63
End: 2018-05-14

## 2018-05-14 NOTE — PROGRESS NOTES
14 DAY STM VISIT    Diagnostic AHI: 39.2        Subjective measures:   Pt feeling benefit from therapy.  She is not feeling mask leak.  She is having some issues with dry mouth.      Assessment: Pt not meeting objective benchmarks for leak Patient failing following subjective benchmarks: dryness of the mouth.   Dry mouth may be caused by mask leak.   Action plan: pt to have 30 day STM visit.  Pt will watch mask fit and try using an oral rinse for dry mouth.    Device type: Auto-CPAP  PAP settings: CPAP min 13 cm  H20     CPAP max 16 cm  H20     90th % xoamjnya12.8 cm  H20    Objective measures: 14 day rolling measures         Compliance  92 %      % of night spent in large leak  11 % last  upload      AHI 2.35   last  upload      Average number of minutes 326     Average hours of usage 5.4              Objective measure goal  Compliance   Goal >70%  Leak   Goal < 10%  AHI  Goal < 5  Usage  Goal >240

## 2018-05-23 ENCOUNTER — OFFICE VISIT (OUTPATIENT)
Dept: CARDIOLOGY | Facility: CLINIC | Age: 63
End: 2018-05-23
Attending: INTERNAL MEDICINE
Payer: COMMERCIAL

## 2018-05-23 VITALS
OXYGEN SATURATION: 96 % | SYSTOLIC BLOOD PRESSURE: 136 MMHG | BODY MASS INDEX: 45.2 KG/M2 | HEIGHT: 67 IN | DIASTOLIC BLOOD PRESSURE: 74 MMHG | HEART RATE: 82 BPM | WEIGHT: 288 LBS

## 2018-05-23 DIAGNOSIS — I36.1 NON-RHEUMATIC TRICUSPID VALVE INSUFFICIENCY: ICD-10-CM

## 2018-05-23 DIAGNOSIS — E66.01 MORBID OBESITY (H): ICD-10-CM

## 2018-05-23 DIAGNOSIS — I87.2 VENOUS (PERIPHERAL) INSUFFICIENCY: ICD-10-CM

## 2018-05-23 DIAGNOSIS — I27.20 PULMONARY HYPERTENSION (H): Primary | ICD-10-CM

## 2018-05-23 PROCEDURE — 99214 OFFICE O/P EST MOD 30 MIN: CPT | Performed by: INTERNAL MEDICINE

## 2018-05-23 NOTE — MR AVS SNAPSHOT
After Visit Summary   5/23/2018    Madyson Murray    MRN: [id number]           Patient Information     Date Of Birth          1955        Visit Information        Provider Department      5/23/2018 3:45 PM Jaden Forde MD [facility]   [place]        Today's Diagnoses     Pulmonary hypertension    -  1    Non-rheumatic tricuspid valve insufficiency        Morbid obesity (H)        Venous (peripheral) insufficiency           Follow-ups after your visit        Additional Services     Follow-Up with Vascular Cardiologist           Follow-Up with Cardiologist                 Future tests that were ordered for you today     Open Future Orders        Priority Expected Expires Ordered    Echocardiogram Routine 5/23/2019 5/24/2019 5/23/2018    Follow-Up with Cardiologist Routine 5/23/2019 5/24/2019 5/23/2018    Follow-Up with Vascular Cardiologist Routine 6/6/2018 5/23/2019 5/23/2018            Who to contact     If you have questions or need follow up information about today's clinic visit or your schedule please contact [facility]   [place] directly at [phone].  Normal or non-critical lab and imaging results will be communicated to you by TVDeckhart, letter or phone within 4 business days after the clinic has received the results. If you do not hear from us within 7 days, please contact the clinic through [employer] or phone. If you have a critical or abnormal lab result, we will notify you by phone as soon as possible.  Submit refill requests through [employer] or call your pharmacy and they will forward the refill request to us. Please allow 3 business days for your refill to be completed.          Additional Information About Your Visit        TVDeckhart Information     [employer] gives you secure access to your electronic health record. If you see a primary care provider, you can also send messages to your care team and make  "appointments. If you have questions, please call your primary care clinic.  If you do not have a primary care provider, please call [phone] and they will assist you.        Care EveryWhere ID     This is your Care EveryWhere ID. This could be used by other organizations to access your [facility] medical records  [id number]        Your Vitals Were     Pulse Height Pulse Oximetry BMI (Body Mass Index)          82 1.702 m (5' 7.01\") 96% 45.09 kg/m2         Blood Pressure from Last 3 Encounters:   05/23/18 136/74   04/16/18 142/72   03/14/18 144/75    Weight from Last 3 Encounters:   05/23/18 130.6 kg (288 lb)   04/16/18 132.9 kg (293 lb)   03/14/18 132.5 kg (292 lb)              We Performed the Following     Follow-Up with Cardiologist        Primary Care Provider Office Phone # Fax #    Ga Ken Jr., [phone] [address]  [place] [address]        Equal Access to Services     [facility]: Hadii aad ku hadasho Soomaali, waaxda luqadaha, qaybta kaalmada adeegyada, waxay idiin haytrinon tanmay chen . So [facility] [phone].    ATENCIÓN: Si habla español, tiene a israel disposición servicios gratuitos de asistencia lingüística. Llame al [phone].    We comply with applicable federal civil rights laws and [place] laws. We do not discriminate on the basis of race, color, national origin, age, disability, sex, sexual orientation, or gender identity.            Thank you!     Thank you for choosing [facility]   [facility]  for your care. Our goal is always to provide you with excellent care. Hearing back from our patients is one way we can continue to improve our services. Please take a few minutes to complete the written survey that you may receive in the mail after your visit with us. Thank you!             Your Updated Medication List - Protect others around you: Learn how to safely use, store and throw away your medicines at " www.disposemymeds.org.          This list is accurate as of 5/23/18  4:03 PM.  Always use your most recent med list.                   Brand Name Dispense Instructions for use Diagnosis    IBUPROFEN PO      Take 200 mg by mouth daily 4 tablets daily

## 2018-05-23 NOTE — LETTER
5/23/2018    Ga Ken Jr, MD  [address] [place] [address]    RE: Madyson Murray       Dear Colleague,    I had the pleasure of seeing Madyson Murray in the [facility].    HPI and Plan:   I had the pleasure of seeing Madyson Murray in follow-up for pulmonary hypertension.  Today, we discussed the results of her sleep study, venous ultrasound and 24 hour ambulatory blood pressure instrument.     Her blood pressure assessment revealed an average blood pressure 150s with a night blood pressure which was higher at 170 average.  Thus she had diurnal variation with nocturnal hypertension more than daytime hypertension.  Since this was done, she has had a sleep study and was diagnosed with severe sleep apnea.  Now she is on CPAP therapy and is able to sleep better.  She is more rested in the morning.    She denies any chest pain or shortness of breath.  We did a venous ultrasound which revealed significant venous insufficiency or reflux and thus she has a venous insufficiency contributing to her leg edema.  She has not been able to find stockings that can fit her legs.    Her echocardiogram in February had shown mild to moderate pulmonary hypertension with RVSP of 37+ right atrial pressure with normal RV size and function with mild to moderate tricuspid regurgitation.      Patient has 15-pack-year history of smoking but none recently.  She has quit several years ago.       Physical examination  See below      Impression  1.  Pulmonary hypertension  At this time, her pulmonary hypertension appears to be more secondary to systemic hypertension and sleep apnea.  Now she is on CPAP therapy for sleep apnea.  This is not been optimized and she is getting used to it.  Although her blood pressures are better today, I recommended starting ACE inhibition for her blood pressure control.  However, she is very reluctant and does not want to start pills.  She tells me that she cannot remember taking  pills regularly.  We talked about following a low-salt diet.  I will have her make an appointment with her primary care physician to discuss starting ACE inhibition.  I would have a low threshold to start therapy.     2.  Systemic hypertension  See above.  She is refusing medications at this time.  I will have her see her primary care physician and recheck blood pressure at the venous clinic    3 Sleep apnea, started on CPAP.     4.   Leg edema  Secondary to venous insufficiency.  I recommended follow-up in the venous clinic and see our nurse practitioner Vida.  We will start compression stockings first and if that fails, she may be a candidate for venous ablation.     5.   We talked at length about decreasing usage of Motrin only on as-needed basis and using instead extra strength Tylenol which is allowed up to less than 3 g per day.       Thank you for allowing us to participate in the care of this nice patient.  She will follow-up in the venous clinic and with me in a year with a repeat echocardiogram prior to visit with me.     Sincerely,     Jaden Forde MD       Orders Placed This Encounter   Procedures     Follow-Up with Vascular Cardiologist     Follow-Up with Cardiologist     Echocardiogram       No orders of the defined types were placed in this encounter.      There are no discontinued medications.      Encounter Diagnoses   Name Primary?     Pulmonary hypertension Yes     Non-rheumatic tricuspid valve insufficiency      Morbid obesity (H)      Venous (peripheral) insufficiency        CURRENT MEDICATIONS:  Current Outpatient Prescriptions   Medication Sig Dispense Refill     IBUPROFEN PO Take 200 mg by mouth daily 4 tablets daily         ALLERGIES     Allergies   Allergen Reactions     Liquid Adhesive        PAST MEDICAL HISTORY:  Past Medical History:   Diagnosis Date     Pulmonary hypertension      Tricuspid regurgitation        PAST SURGICAL HISTORY:  Past Surgical History:   Procedure Laterality Date  "    ORTHOPEDIC SURGERY      2010 and 2012       FAMILY HISTORY:  Family History   Problem Relation Age of Onset     Hypertension Mother      DIABETES Father      Hypertension Father      Parkinsonism Father      DIABETES Sister      Hypertension Sister      Sleep Apnea Sister      Sleep Apnea Brother        SOCIAL HISTORY:  Social History     Social History     Marital status:      Spouse name: N/A     Number of children: N/A     Years of education: N/A     Social History Main Topics     Smoking status: Former Smoker     Packs/day: 1.00     Years: 15.00     Smokeless tobacco: Never Used      Comment: quit around age 32     Alcohol use Yes      Comment: 2-4 drinks couple times per week     Drug use: No     Sexual activity: Yes     Other Topics Concern     None     Social History Narrative       Review of Systems:  Skin:  Negative       Eyes:  Positive for visual blurring    ENT:  Negative      Respiratory:  Negative       Cardiovascular:  Negative      Gastroenterology: Negative      Genitourinary:  Negative      Musculoskeletal:  Positive for back pain    Neurologic:  Positive for headaches    Psychiatric:  Negative      Heme/Lymph/Imm:    allergies    Endocrine:  Negative        Physical Exam:  Vitals: /74  Pulse 82  Ht 1.702 m (5' 7.01\")  Wt 130.6 kg (288 lb)  SpO2 96%  BMI 45.09 kg/m2    Constitutional:    obese      Skin:  warm and dry to the touch          Head:  normocephalic        Eyes:  pupils equal and round        Lymph:No Cervical lymphadenopathy present     ENT:           Neck:  carotid pulses are full and equal bilaterally        Respiratory:  clear to auscultation         Cardiac: regular rhythm;normal S1 and S2       systolic ejection murmur;grade 2;RUSB                                                 GI:    obese      Extremities and Muscular Skeletal:      1+;trace;bilateral LE edema          Neurological:  no gross motor deficits        Psych:  Alert and Oriented x 3  "       CC  Jaden Forde MD  [address]  [address]  [place] [id number]                Thank you for allowing me to participate in the care of your patient.      Sincerely,     Jaden Forde MD     [facility]    cc:   Jaden Forde MD  [address]  [address]  [place] [address]

## 2018-05-23 NOTE — PROGRESS NOTES
HPI and Plan:   I had the pleasure of seeing Madyson Murray in follow-up for pulmonary hypertension.  Today, we discussed the results of her sleep study, venous ultrasound and 24 hour ambulatory blood pressure instrument.     Her blood pressure assessment revealed an average blood pressure 150s with a night blood pressure which was higher at 170 average.  Thus she had diurnal variation with nocturnal hypertension more than daytime hypertension.  Since this was done, she has had a sleep study and was diagnosed with severe sleep apnea.  Now she is on CPAP therapy and is able to sleep better.  She is more rested in the morning.    She denies any chest pain or shortness of breath.  We did a venous ultrasound which revealed significant venous insufficiency or reflux and thus she has a venous insufficiency contributing to her leg edema.  She has not been able to find stockings that can fit her legs.    Her echocardiogram in February had shown mild to moderate pulmonary hypertension with RVSP of 37+ right atrial pressure with normal RV size and function with mild to moderate tricuspid regurgitation.      Patient has 15-pack-year history of smoking but none recently.  She has quit several years ago.       Physical examination  See below      Impression  1.  Pulmonary hypertension  At this time, her pulmonary hypertension appears to be more secondary to systemic hypertension and sleep apnea.  Now she is on CPAP therapy for sleep apnea.  This is not been optimized and she is getting used to it.  Although her blood pressures are better today, I recommended starting ACE inhibition for her blood pressure control.  However, she is very reluctant and does not want to start pills.  She tells me that she cannot remember taking pills regularly.  We talked about following a low-salt diet.  I will have her make an appointment with her primary care physician to discuss starting ACE inhibition.  I would have a low threshold to start  therapy.     2.  Systemic hypertension  See above.  She is refusing medications at this time.  I will have her see her primary care physician and recheck blood pressure at the venous clinic    3 Sleep apnea, started on CPAP.     4.   Leg edema  Secondary to venous insufficiency.  I recommended follow-up in the venous clinic and see our nurse practitioner Vida.  We will start compression stockings first and if that fails, she may be a candidate for venous ablation.     5.   We talked at length about decreasing usage of Motrin only on as-needed basis and using instead extra strength Tylenol which is allowed up to less than 3 g per day.       Thank you for allowing us to participate in the care of this nice patient.  She will follow-up in the venous clinic and with me in a year with a repeat echocardiogram prior to visit with me.     Sincerely,     Jaden Forde MD       Orders Placed This Encounter   Procedures     Follow-Up with Vascular Cardiologist     Follow-Up with Cardiologist     Echocardiogram       No orders of the defined types were placed in this encounter.      There are no discontinued medications.      Encounter Diagnoses   Name Primary?     Pulmonary hypertension Yes     Non-rheumatic tricuspid valve insufficiency      Morbid obesity (H)      Venous (peripheral) insufficiency        CURRENT MEDICATIONS:  Current Outpatient Prescriptions   Medication Sig Dispense Refill     IBUPROFEN PO Take 200 mg by mouth daily 4 tablets daily         ALLERGIES     Allergies   Allergen Reactions     Liquid Adhesive        PAST MEDICAL HISTORY:  Past Medical History:   Diagnosis Date     Pulmonary hypertension      Tricuspid regurgitation        PAST SURGICAL HISTORY:  Past Surgical History:   Procedure Laterality Date     ORTHOPEDIC SURGERY      2010 and 2012       FAMILY HISTORY:  Family History   Problem Relation Age of Onset     Hypertension Mother      DIABETES Father      Hypertension Father      Parkinsonism  "Father      DIABETES Sister      Hypertension Sister      Sleep Apnea Sister      Sleep Apnea Brother        SOCIAL HISTORY:  Social History     Social History     Marital status:      Spouse name: N/A     Number of children: N/A     Years of education: N/A     Social History Main Topics     Smoking status: Former Smoker     Packs/day: 1.00     Years: 15.00     Smokeless tobacco: Never Used      Comment: quit around age 32     Alcohol use Yes      Comment: 2-4 drinks couple times per week     Drug use: No     Sexual activity: Yes     Other Topics Concern     None     Social History Narrative       Review of Systems:  Skin:  Negative       Eyes:  Positive for visual blurring    ENT:  Negative      Respiratory:  Negative       Cardiovascular:  Negative      Gastroenterology: Negative      Genitourinary:  Negative      Musculoskeletal:  Positive for back pain    Neurologic:  Positive for headaches    Psychiatric:  Negative      Heme/Lymph/Imm:    allergies    Endocrine:  Negative        Physical Exam:  Vitals: /74  Pulse 82  Ht 1.702 m (5' 7.01\")  Wt 130.6 kg (288 lb)  SpO2 96%  BMI 45.09 kg/m2    Constitutional:    obese      Skin:  warm and dry to the touch          Head:  normocephalic        Eyes:  pupils equal and round        Lymph:No Cervical lymphadenopathy present     ENT:           Neck:  carotid pulses are full and equal bilaterally        Respiratory:  clear to auscultation         Cardiac: regular rhythm;normal S1 and S2       systolic ejection murmur;grade 2;RUSB                                                 GI:    obese      Extremities and Muscular Skeletal:      1+;trace;bilateral LE edema          Neurological:  no gross motor deficits        Psych:  Alert and Oriented x 3        CC  Jaden Forde MD  [address]  [address]  [place] [address]              "

## 2018-05-23 NOTE — LETTER
5/23/2018    Ga Ken Jr, MD  [address] [place] [address]    RE: Madyson Murray       Dear Colleague,    I had the pleasure of seeing Madyson Murray in the [facility].    HPI and Plan:   I had the pleasure of seeing Madyson Murray in follow-up for pulmonary hypertension.  Today, we discussed the results of her sleep study, venous ultrasound and 24 hour ambulatory blood pressure instrument.     Her blood pressure assessment revealed an average blood pressure 150s with a night blood pressure which was higher at 170 average.  Thus she had diurnal variation with nocturnal hypertension more than daytime hypertension.  Since this was done, she has had a sleep study and was diagnosed with severe sleep apnea.  Now she is on CPAP therapy and is able to sleep better.  She is more rested in the morning.    She denies any chest pain or shortness of breath.  We did a venous ultrasound which revealed significant venous insufficiency or reflux and thus she has a venous insufficiency contributing to her leg edema.  She has not been able to find stockings that can fit her legs.    Her echocardiogram in February had shown mild to moderate pulmonary hypertension with RVSP of 37+ right atrial pressure with normal RV size and function with mild to moderate tricuspid regurgitation.      Patient has 15-pack-year history of smoking but none recently.  She has quit several years ago.       Physical examination  See below      Impression  1.  Pulmonary hypertension  At this time, her pulmonary hypertension appears to be more secondary to systemic hypertension and sleep apnea.  Now she is on CPAP therapy for sleep apnea.  This is not been optimized and she is getting used to it.  Although her blood pressures are better today, I recommended starting ACE inhibition for her blood pressure control.  However, she is very reluctant and does not want to start pills.  She tells me that she cannot remember taking  pills regularly.  We talked about following a low-salt diet.  I will have her make an appointment with her primary care physician to discuss starting ACE inhibition.  I would have a low threshold to start therapy.     2.  Systemic hypertension  See above.  She is refusing medications at this time.  I will have her see her primary care physician and recheck blood pressure at the venous clinic    3 Sleep apnea, started on CPAP.     4.   Leg edema  Secondary to venous insufficiency.  I recommended follow-up in the venous clinic and see our nurse practitioner Vida.  We will start compression stockings first and if that fails, she may be a candidate for venous ablation.     5.   We talked at length about decreasing usage of Motrin only on as-needed basis and using instead extra strength Tylenol which is allowed up to less than 3 g per day.       Thank you for allowing us to participate in the care of this nice patient.  She will follow-up in the venous clinic and with me in a year with a repeat echocardiogram prior to visit with me.     Sincerely,     Jaden Forde MD       Orders Placed This Encounter   Procedures     Follow-Up with Vascular Cardiologist     Follow-Up with Cardiologist     Echocardiogram       No orders of the defined types were placed in this encounter.      There are no discontinued medications.      Encounter Diagnoses   Name Primary?     Pulmonary hypertension Yes     Non-rheumatic tricuspid valve insufficiency      Morbid obesity (H)      Venous (peripheral) insufficiency        CURRENT MEDICATIONS:  Current Outpatient Prescriptions   Medication Sig Dispense Refill     IBUPROFEN PO Take 200 mg by mouth daily 4 tablets daily         ALLERGIES     Allergies   Allergen Reactions     Liquid Adhesive        PAST MEDICAL HISTORY:  Past Medical History:   Diagnosis Date     Pulmonary hypertension      Tricuspid regurgitation        PAST SURGICAL HISTORY:  Past Surgical History:   Procedure Laterality Date  "    ORTHOPEDIC SURGERY      2010 and 2012       FAMILY HISTORY:  Family History   Problem Relation Age of Onset     Hypertension Mother      DIABETES Father      Hypertension Father      Parkinsonism Father      DIABETES Sister      Hypertension Sister      Sleep Apnea Sister      Sleep Apnea Brother        SOCIAL HISTORY:  Social History     Social History     Marital status:      Spouse name: N/A     Number of children: N/A     Years of education: N/A     Social History Main Topics     Smoking status: Former Smoker     Packs/day: 1.00     Years: 15.00     Smokeless tobacco: Never Used      Comment: quit around age 32     Alcohol use Yes      Comment: 2-4 drinks couple times per week     Drug use: No     Sexual activity: Yes     Other Topics Concern     None     Social History Narrative       Review of Systems:  Skin:  Negative       Eyes:  Positive for visual blurring    ENT:  Negative      Respiratory:  Negative       Cardiovascular:  Negative      Gastroenterology: Negative      Genitourinary:  Negative      Musculoskeletal:  Positive for back pain    Neurologic:  Positive for headaches    Psychiatric:  Negative      Heme/Lymph/Imm:    allergies    Endocrine:  Negative        Physical Exam:  Vitals: /74  Pulse 82  Ht 1.702 m (5' 7.01\")  Wt 130.6 kg (288 lb)  SpO2 96%  BMI 45.09 kg/m2    Constitutional:    obese      Skin:  warm and dry to the touch          Head:  normocephalic        Eyes:  pupils equal and round        Lymph:No Cervical lymphadenopathy present     ENT:           Neck:  carotid pulses are full and equal bilaterally        Respiratory:  clear to auscultation         Cardiac: regular rhythm;normal S1 and S2       systolic ejection murmur;grade 2;RUSB                                                 GI:    obese      Extremities and Muscular Skeletal:      1+;trace;bilateral LE edema          Neurological:  no gross motor deficits        Psych:  Alert and Oriented x 3  "         Thank you for allowing me to participate in the care of your patient.    Sincerely,     Jaden Forde MD     [facility]

## 2018-05-29 ENCOUNTER — DOCUMENTATION ONLY (OUTPATIENT)
Dept: SLEEP MEDICINE | Facility: CLINIC | Age: 63
End: 2018-05-29
Payer: COMMERCIAL

## 2018-06-01 ENCOUNTER — OFFICE VISIT (OUTPATIENT)
Dept: CARDIOLOGY | Facility: CLINIC | Age: 63
End: 2018-06-01
Attending: INTERNAL MEDICINE
Payer: COMMERCIAL

## 2018-06-01 VITALS
WEIGHT: 285 LBS | SYSTOLIC BLOOD PRESSURE: 145 MMHG | BODY MASS INDEX: 44.73 KG/M2 | DIASTOLIC BLOOD PRESSURE: 87 MMHG | HEIGHT: 67 IN | HEART RATE: 77 BPM

## 2018-06-01 DIAGNOSIS — I87.2 VENOUS (PERIPHERAL) INSUFFICIENCY: ICD-10-CM

## 2018-06-01 PROCEDURE — 99213 OFFICE O/P EST LOW 20 MIN: CPT | Performed by: NURSE PRACTITIONER

## 2018-06-01 NOTE — PROGRESS NOTES
Vein and Edema Clinic Progress Note  Madyson Murray MRN# [id number]   YOB: 1955 Age: 63 year old     Reason for visit:  Bilateral lower extremity edema           Assessment and Plan:     1. Bilateral lower extremity edema    Velcro compression stockings     Consider diuretic for both lower extremity edema and hypertension    Reviewed with Dr. Wong and at this time venous intervention would not likely benefit the patient.     In the future if edema is worse in the right lower extremity, could consider glue for the right GSV below the knee    Follow up in vein clinic as needed    2. Hypertension    Dr. Forde recommends ACE inhibitor, but patient is hesitant to take medications    3. Sleep apnea    Compliant with CPAP         History of Presenting Illness:    Madyson Murray is a very pleasant 63 year old patient of Dr. Forde who presents today to discuss bilateral lower extremity edema and hypertension.  She is a past medical history significant for pulmonary hypertension, recent diagnosis of sleep apnea (compliant with CPAP), history of tobacco abuse (15-pack-year history, but no longer smoking) and obesity.    She was referred to venous treatment clinic for an ongoing history of bilateral lower extremity edema.  Her most recent echocardiogram showed normal LV systolic function with sigmoid septum.  There is evidence to mild to moderate tricuspid regurgitation and mild pulmonary hypertension with RV SP of 37+ right atrial pressure.  She had a venous competency study that showed significant reflux in the great saphenous vein predominantly in the mid calf and ankle segments.  The left saphenous vein was incompetent at the saphenofemoral junction.  There is also bilateral small saphenous vein reflux present.  At her last visit she was prescribed compression stockings.  Unfortunately, she is unable to wear them due to discomfort.          This note was completed in part using Dragon voice recognition software.  "Although reviewed after completion, some word and grammatical errors may occur       Review of Systems:   Review of Systems:  Skin:  Negative     Eyes:  Positive for glasses  ENT:  Positive for  (2 teeth pulled this morning.)  Respiratory:  Negative    Cardiovascular:    Positive for;edema;lower extremity symptoms  Gastroenterology: Negative    Genitourinary:  not assessed    Musculoskeletal:  Positive for nocturnal cramping;back pain  Neurologic:  Positive for headaches  Psychiatric:  Negative    Heme/Lymph/Imm:  Negative    Endocrine:  Negative                Physical Exam:     Vitals: /87  Pulse 77  Ht 1.702 m (5' 7\")  Wt 129.3 kg (285 lb)  BMI 44.64 kg/m2  Constitutional:    obese      Skin:  warm and dry to the touch        Head:  normocephalic        Eyes:  pupils equal and round        Neck:  carotid pulses are full and equal bilaterally        Chest:  clear to auscultation        Cardiac: regular rhythm;normal S1 and S2       systolic ejection murmur;grade 2;RUSB          Abdomen:    obese      Extremities and Back:       Bilateral LE edema 1+    Neurological:  no gross motor deficits        CEAP classification for chronic venous disorders   Clinical classification   C0 No visible or palpable signs of venous disease   C1 Telangiectasias, reticular veins, malleolar flares   C2 Varicose veins   C3 Edema without skin changes   C4 Skin changes ascribed to venous disease (eg, pigmentation, venous eczema, lipodermatosclerosis)   C4a Pigmentation or eczema   C4b Lipodermatosclerosis or atrophie rhonda   C5 Skin changes as defined above with healed ulceration   C6 Skin changes as defined above with active ulceration   S Symptomatic, including ache, pain, tightness, skin irritation, heaviness, and muscle cramps, and other complaints attributable to venous dysfunction   A Asymptomatic   Etiologic classification   Ec Congenital   Ep Primary   Es Secondary (postthrombotic)   En No venous cause identified "   Anatomic classification   As Superficial veins   Ap  veins   Ad Deep veins   An No venous location identified   Pathophysiologic classification   Pr Reflux   Po Obstruction   Pr,o Reflux and obstruction   Pn No venous pathophysiology identifiable          Medications:     Current Outpatient Prescriptions   Medication Sig Dispense Refill     IBUPROFEN PO Take 200 mg by mouth daily 4 tablets daily             Family History   Problem Relation Age of Onset     Hypertension Mother      DIABETES Father      Hypertension Father      Parkinsonism Father      DIABETES Sister      Hypertension Sister      Sleep Apnea Sister      Sleep Apnea Brother        Social History     Social History     Marital status:      Spouse name: N/A     Number of children: N/A     Years of education: N/A     Occupational History     Not on file.     Social History Main Topics     Smoking status: Former Smoker     Packs/day: 1.00     Years: 15.00     Smokeless tobacco: Never Used      Comment: quit around age 32     Alcohol use Yes      Comment: 2-4 drinks couple times per week     Drug use: No     Sexual activity: Yes     Other Topics Concern     Not on file     Social History Narrative            Past Medical History:     Past Medical History:   Diagnosis Date     Pulmonary hypertension      Tricuspid regurgitation               Past Surgical History:     Past Surgical History:   Procedure Laterality Date     ORTHOPEDIC SURGERY      2010 and 2012              Allergies:   Liquid adhesive       Data:   All laboratory data reviewed:    LAST CHOLESTEROL:  Lab Results   Component Value Date    CHOL 186 02/05/2018     Lab Results   Component Value Date    HDL 62 02/05/2018     Lab Results   Component Value Date     02/05/2018     Lab Results   Component Value Date    TRIG 66 02/05/2018     No results found for: CHOLHDLRATIO    LAST BMP:  Lab Results   Component Value Date     03/05/2018      Lab Results   Component  Value Date    POTASSIUM 4.0 03/05/2018     Lab Results   Component Value Date    CHLORIDE 104 03/05/2018     Lab Results   Component Value Date    STUART 9.0 03/05/2018     Lab Results   Component Value Date    CO2 28 03/05/2018     Lab Results   Component Value Date    BUN 18 03/05/2018     Lab Results   Component Value Date    CR 0.85 03/05/2018     Lab Results   Component Value Date    GLC 81 03/05/2018       ALICIA NASCIMENTO, APRN, CNP      1. Compression stockings for 3 months: No    2.Weight maintenance or loss:            A. Exercise program: taking the dog for short walks 3 x day. 15-30 min.            B. Weight loss:3 lbs since last OV        C. Diet plan: none      3. Leg elevation: occasional 30 min after work.    4.Functionality Limitations:       A.Fatigue: soreness       B.Limited mobility: lots of standing at work.       C.Other:        5. Chronic use of analgesic use:         A. Medication type: occasional Ibuprofen    6. Venous ultrasound done within last 6 months: 3/2018

## 2018-06-01 NOTE — MR AVS SNAPSHOT
After Visit Summary   6/1/2018    Madyson Murray    MRN: [id number]           Patient Information     Date Of Birth          1955        Visit Information        Provider Department      6/1/2018 3:30 PM Vida Hernández APRN CNP [facility]        Today's Diagnoses     Venous (peripheral) insufficiency          Care Instructions    Today's Recommendations    1. Velcro compression stocking 20 to 30 mmHg  2. Elevation of legs  3. Low sodium diet  4. Consider blood pressure medication that is also a water pill  5. Vein study to be reviewed with Dr. Wong and will call in follow up.    Please send a [employer] [address] message or call [phone] with questions or concerns.     Scheduling number [phone].            Follow-ups after your visit        Who to contact     If you have questions or need follow up information about today's clinic visit or your schedule please contact [facility] directly at [phone].  Normal or non-critical lab and imaging results will be communicated to you by [employer], letter or phone within 4 business days after the clinic has received the results. If you do not hear from us within 7 days, please contact the clinic through [address] or phone. If you have a critical or abnormal lab result, we will notify you by phone as soon as possible.  Submit refill requests through [employer] or call your pharmacy and they will forward the refill request to us. Please allow 3 business days for your refill to be completed.          Additional Information About Your Visit        [employer] Information     [employer] [address] gives you secure access to your electronic health record. If you see a primary care provider, you can also send messages to your care team and make appointments. If you have questions, please call your primary care clinic.  If you do not have a primary care provider, please call [phone] and they will assist you.    "     Care EveryWhere ID     This is your Care EveryWhere ID. This could be used by other organizations to access your [place] medical records  [id number]        Your Vitals Were     Pulse Height BMI (Body Mass Index)             77 1.702 m (5' 7\") 44.64 kg/m2          Blood Pressure from Last 3 Encounters:   06/01/18 145/87   05/23/18 136/74   04/16/18 142/72    Weight from Last 3 Encounters:   06/01/18 129.3 kg (285 lb)   05/23/18 130.6 kg (288 lb)   04/16/18 132.9 kg (293 lb)              We Performed the Following     Follow-Up with Vascular Cardiologist        Primary Care Provider Office Phone # Fax #    Ga Ken Jr., [phone] [address]  [place] [address]        Equal Access to Services     [facility]: Hadii aad ku hadasho Soomaali, waaxda luqadaha, qaybta kaalmada adeegyada, chase looneyin hayaan tanmay chen . So [facility] [phone].    ATENCIÓN: Si habla español, tiene a israel disposición servicios gratuitos de asistencia lingüística. Radhaame al [phone].    We comply with applicable federal civil rights laws and [place] laws. We do not discriminate on the basis of race, color, national origin, age, disability, sex, sexual orientation, or gender identity.            Thank you!     Thank you for choosing [facility]  for your care. Our goal is always to provide you with excellent care. Hearing back from our patients is one way we can continue to improve our services. Please take a few minutes to complete the written survey that you may receive in the mail after your visit with us. Thank you!             Your Updated Medication List - Protect others around you: Learn how to safely use, store and throw away your medicines at www.disposemymeds.org.          This list is accurate as of 6/1/18  3:50 PM.  Always use your most recent med list.                   Brand Name Dispense Instructions for use Diagnosis    IBUPROFEN PO "      Take 200 mg by mouth daily 4 tablets daily

## 2018-06-01 NOTE — PATIENT INSTRUCTIONS
Today's Recommendations    1. Velcro compression stocking 20 to 30 mmHg  2. Elevation of legs  3. Low sodium diet  4. Consider blood pressure medication that is also a water pill  5. Vein study to be reviewed with Dr. Wong and will call in follow up.    Please send a [employer] message or call [phone] with questions or concerns.     Scheduling number [phone].

## 2018-06-01 NOTE — LETTER
6/1/2018    Ga Ken Jr, MD  [address] [facility] [address]    RE: Madyson Murray       Dear Colleague,    I had the pleasure of seeing Madyson Murray in the [facility].    Vein and Edema Clinic Progress Note  Madyson Murray MRN# [id number]   YOB: 1955 Age: 63 year old     Reason for visit:  Bilateral lower extremity edema           Assessment and Plan:     1. Bilateral lower extremity edema    Velcro compression stockings     Consider diuretic for both lower extremity edema and hypertension    Reviewed with Dr. Wong and at this time venous intervention would not likely benefit the patient.     In the future if edema is worse in the right lower extremity, could consider glue for the right GSV below the knee    Follow up in vein clinic as needed    2. Hypertension    Dr. Forde recommends ACE inhibitor, but patient is hesitant to take medications    3. Sleep apnea    Compliant with CPAP         History of Presenting Illness:    Madyson Murray is a very pleasant 63 year old patient of Dr. Forde who presents today to discuss bilateral lower extremity edema and hypertension.  She is a past medical history significant for pulmonary hypertension, recent diagnosis of sleep apnea (compliant with CPAP), history of tobacco abuse (15-pack-year history, but no longer smoking) and obesity.    She was referred to venous treatment clinic for an ongoing history of bilateral lower extremity edema.  Her most recent echocardiogram showed normal LV systolic function with sigmoid septum.  There is evidence to mild to moderate tricuspid regurgitation and mild pulmonary hypertension with RV SP of 37+ right atrial pressure.  She had a venous competency study that showed significant reflux in the great saphenous vein predominantly in the mid calf and ankle segments.  The left saphenous vein was incompetent at the saphenofemoral junction.  There is also bilateral small saphenous vein reflux present.   "At her last visit she was prescribed compression stockings.  Unfortunately, she is unable to wear them due to discomfort.          This note was completed in part using Dragon voice recognition software. Although reviewed after completion, some word and grammatical errors may occur       Review of Systems:   Review of Systems:  Skin:  Negative     Eyes:  Positive for glasses  ENT:  Positive for  (2 teeth pulled this morning.)  Respiratory:  Negative    Cardiovascular:    Positive for;edema;lower extremity symptoms  Gastroenterology: Negative    Genitourinary:  not assessed    Musculoskeletal:  Positive for nocturnal cramping;back pain  Neurologic:  Positive for headaches  Psychiatric:  Negative    Heme/Lymph/Imm:  Negative    Endocrine:  Negative                Physical Exam:     Vitals: /87  Pulse 77  Ht 1.702 m (5' 7\")  Wt 129.3 kg (285 lb)  BMI 44.64 kg/m2  Constitutional:    obese      Skin:  warm and dry to the touch        Head:  normocephalic        Eyes:  pupils equal and round        Neck:  carotid pulses are full and equal bilaterally        Chest:  clear to auscultation        Cardiac: regular rhythm;normal S1 and S2       systolic ejection murmur;grade 2;RUSB          Abdomen:    obese      Extremities and Back:       Bilateral LE edema 1+    Neurological:  no gross motor deficits        CEAP classification for chronic venous disorders   Clinical classification   C0 No visible or palpable signs of venous disease   C1 Telangiectasias, reticular veins, malleolar flares   C2 Varicose veins   C3 Edema without skin changes   C4 Skin changes ascribed to venous disease (eg, pigmentation, venous eczema, lipodermatosclerosis)   C4a Pigmentation or eczema   C4b Lipodermatosclerosis or atrophie rhonda   C5 Skin changes as defined above with healed ulceration   C6 Skin changes as defined above with active ulceration   S Symptomatic, including ache, pain, tightness, skin irritation, heaviness, and muscle " cramps, and other complaints attributable to venous dysfunction   A Asymptomatic   Etiologic classification   Ec Congenital   Ep Primary   Es Secondary (postthrombotic)   En No venous cause identified   Anatomic classification   As Superficial veins   Ap  veins   Ad Deep veins   An No venous location identified   Pathophysiologic classification   Pr Reflux   Po Obstruction   Pr,o Reflux and obstruction   Pn No venous pathophysiology identifiable          Medications:     Current Outpatient Prescriptions   Medication Sig Dispense Refill     IBUPROFEN PO Take 200 mg by mouth daily 4 tablets daily             Family History   Problem Relation Age of Onset     Hypertension Mother      DIABETES Father      Hypertension Father      Parkinsonism Father      DIABETES Sister      Hypertension Sister      Sleep Apnea Sister      Sleep Apnea Brother        Social History     Social History     Marital status:      Spouse name: N/A     Number of children: N/A     Years of education: N/A     Occupational History     Not on file.     Social History Main Topics     Smoking status: Former Smoker     Packs/day: 1.00     Years: 15.00     Smokeless tobacco: Never Used      Comment: quit around age 32     Alcohol use Yes      Comment: 2-4 drinks couple times per week     Drug use: No     Sexual activity: Yes     Other Topics Concern     Not on file     Social History Narrative            Past Medical History:     Past Medical History:   Diagnosis Date     Pulmonary hypertension      Tricuspid regurgitation               Past Surgical History:     Past Surgical History:   Procedure Laterality Date     ORTHOPEDIC SURGERY      2010 and 2012              Allergies:   Liquid adhesive       Data:   All laboratory data reviewed:    LAST CHOLESTEROL:  Lab Results   Component Value Date    CHOL 186 02/05/2018     Lab Results   Component Value Date    HDL 62 02/05/2018     Lab Results   Component Value Date      02/05/2018     Lab Results   Component Value Date    TRIG 66 02/05/2018     No results found for: JONNIE    LAST BMP:  Lab Results   Component Value Date     03/05/2018      Lab Results   Component Value Date    POTASSIUM 4.0 03/05/2018     Lab Results   Component Value Date    CHLORIDE 104 03/05/2018     Lab Results   Component Value Date    STUART 9.0 03/05/2018     Lab Results   Component Value Date    CO2 28 03/05/2018     Lab Results   Component Value Date    BUN 18 03/05/2018     Lab Results   Component Value Date    CR 0.85 03/05/2018     Lab Results   Component Value Date    GLC 81 03/05/2018       PB JOHNSON, CNP      1. Compression stockings for 3 months: No    2.Weight maintenance or loss:            A. Exercise program: taking the dog for short walks 3 x day. 15-30 min.            B. Weight loss:3 lbs since last OV        C. Diet plan: none      3. Leg elevation: occasional 30 min after work.    4.Functionality Limitations:       A.Fatigue: soreness       B.Limited mobility: lots of standing at work.       C.Other:        5. Chronic use of analgesic use:         A. Medication type: occasional Ibuprofen    6. Venous ultrasound done within last 6 months: 3/2018                              Thank you for allowing me to participate in the care of your patient.      Sincerely,     PB JOHNSON CNP     [facility]

## 2018-06-06 ENCOUNTER — TELEPHONE (OUTPATIENT)
Dept: CARDIOLOGY | Facility: CLINIC | Age: 63
End: 2018-06-06

## 2018-06-06 NOTE — TELEPHONE ENCOUNTER
Staff message received:     Please let Madyson know that I reviewed her study with Dr. Wong. At this time, we do not believe venous intervention will be beneficial. Continue compression therapy, elevation and could consider diuretics in the future. Follow up as needed with vein clinic.     Thanks     Vida      Spoke to patient and informed her of Vida's message above. Pt verbalized understanding and thanked writer for the call. No further questions or concerns.

## 2018-10-26 ENCOUNTER — DOCUMENTATION ONLY (OUTPATIENT)
Dept: SLEEP MEDICINE | Facility: CLINIC | Age: 63
End: 2018-10-26

## 2018-10-26 NOTE — PROGRESS NOTES
6 Month [facility] visit    Diagnostic AHI: 39.2    PSG    Data only recheck     Assessment: Pt meeting objective benchmarks.     Action plan:   pt to follow up per provider request (1-2 yrs)  Device type: Auto-CPAP  PAP settings: CPAP min 13 cm  H20     CPAP max 16 cm  H20     90th % [id number] cm  H20    Objective measures: 14 day rolling measures         Compliance  92 %     % of night spent in large leak  6 % last  upload      AHI 1.82   last  upload      Average number of minutes 341           Objective measure goal  Compliance   Goal >70%  Leak   Goal < 10%  AHI  Goal < 5  Usage  Goal >240

## 2019-05-06 DIAGNOSIS — G47.33 OSA (OBSTRUCTIVE SLEEP APNEA): Primary | ICD-10-CM

## 2019-07-03 ENCOUNTER — TELEPHONE (OUTPATIENT)
Dept: SLEEP MEDICINE | Facility: CLINIC | Age: 64
End: 2019-07-03

## 2019-07-03 NOTE — TELEPHONE ENCOUNTER
PATIENT CALLED STATING MACHINE IS NOT RECORDING AT NIGHT. PATIENT IS SCHEDULE FOR A PAP TROUBLESHOOT WITH SRAVANI AT THE SLEEP LAB IN [place] ON 07/08/2019 AT 2:00PM. PATIENT STATED THAT IF ISSUE RESOLVES ITSELF SHE WILL CALL AND CANCEL APPOINTMENT.

## 2019-09-18 ENCOUNTER — ANCILLARY PROCEDURE (OUTPATIENT)
Dept: GENERAL RADIOLOGY | Facility: CLINIC | Age: 64
End: 2019-09-18
Attending: FAMILY MEDICINE
Payer: COMMERCIAL

## 2019-09-18 ENCOUNTER — OFFICE VISIT (OUTPATIENT)
Dept: FAMILY MEDICINE | Facility: CLINIC | Age: 64
End: 2019-09-18
Payer: COMMERCIAL

## 2019-09-18 VITALS
WEIGHT: 288 LBS | HEIGHT: 67 IN | SYSTOLIC BLOOD PRESSURE: 138 MMHG | OXYGEN SATURATION: 94 % | DIASTOLIC BLOOD PRESSURE: 82 MMHG | BODY MASS INDEX: 45.2 KG/M2 | HEART RATE: 100 BPM | TEMPERATURE: 98.1 F

## 2019-09-18 DIAGNOSIS — I27.20 PULMONARY HYPERTENSION (H): ICD-10-CM

## 2019-09-18 DIAGNOSIS — M47.816 SPONDYLOSIS OF LUMBAR REGION WITHOUT MYELOPATHY OR RADICULOPATHY: ICD-10-CM

## 2019-09-18 DIAGNOSIS — G89.29 CHRONIC BILATERAL THORACIC BACK PAIN: ICD-10-CM

## 2019-09-18 DIAGNOSIS — Z11.4 SCREENING FOR HIV (HUMAN IMMUNODEFICIENCY VIRUS): ICD-10-CM

## 2019-09-18 DIAGNOSIS — G89.29 CHRONIC BILATERAL LOW BACK PAIN WITHOUT SCIATICA: Primary | ICD-10-CM

## 2019-09-18 DIAGNOSIS — E66.01 MORBID OBESITY (H): ICD-10-CM

## 2019-09-18 DIAGNOSIS — G89.29 CHRONIC BILATERAL LOW BACK PAIN WITHOUT SCIATICA: ICD-10-CM

## 2019-09-18 DIAGNOSIS — M54.50 CHRONIC BILATERAL LOW BACK PAIN WITHOUT SCIATICA: Primary | ICD-10-CM

## 2019-09-18 DIAGNOSIS — M54.6 CHRONIC BILATERAL THORACIC BACK PAIN: ICD-10-CM

## 2019-09-18 DIAGNOSIS — Z12.31 ENCOUNTER FOR SCREENING MAMMOGRAM FOR BREAST CANCER: ICD-10-CM

## 2019-09-18 DIAGNOSIS — M54.50 CHRONIC BILATERAL LOW BACK PAIN WITHOUT SCIATICA: ICD-10-CM

## 2019-09-18 PROCEDURE — 72100 X-RAY EXAM L-S SPINE 2/3 VWS: CPT | Mod: FY

## 2019-09-18 PROCEDURE — 99214 OFFICE O/P EST MOD 30 MIN: CPT | Performed by: FAMILY MEDICINE

## 2019-09-18 PROCEDURE — 72070 X-RAY EXAM THORAC SPINE 2VWS: CPT | Mod: FY

## 2019-09-18 RX ORDER — LISINOPRIL 5 MG/1
5 TABLET ORAL DAILY
Qty: 90 TABLET | Refills: 3 | Status: SHIPPED | OUTPATIENT
Start: 2019-09-18 | End: 2019-12-12 | Stop reason: SINTOL

## 2019-09-18 ASSESSMENT — MIFFLIN-ST. JEOR: SCORE: 1898.99

## 2019-09-18 NOTE — PROGRESS NOTES
"Subjective     Madyson Murray is a 64 year old female who presents to clinic today for the following health issues:    HPI   Back Pain       Duration: Chronic Back Pain         Specific cause: none.  Has been ongoing for at least two years.    Description:   Location of pain: low back left  Character of pain: shooting pain in a spot   Pain radiation:none  New numbness or weakness in legs, not attributed to pain:  no     Intensity: moderate, constant     History:   Pain interferes with job: No  History of back problems: recurrent self limited episodes of low back pain in the past  Any previous MRI or X-rays: Yes--at [facility].  Date 2018  Sees a specialist for back pain:  No  Therapies tried without relief: ibuprofen  Only last for a while     Alleviating factors:   Improved by: chiropractor, heat, massage and NSAIDs      Precipitating factors:  Worsened by: Lifting, Bending, Standing, Sitting, Lying Flat, Walking and Coughing    Balance is more problematic recently. Hasn't fallen, however.            Accompanying Signs & Symptoms:  Risk of Fracture:  None  Risk of Cauda Equina:  None  Risk of Infection:  None  Risk of Cancer:  None  Risk of Ankylosing Spondylitis:  Onset at age <35, male, AND morning back stiffness. no                Has a history of pulmonary hypertension for which it was recommended that she begin an ACE inhibitor.  She declined to do this with cardiology consultation last summer.    She is also due for breast cancer screening and HIV screening.  She reports is been 7 or 8 years since her last mammogram.          Reviewed and updated as needed this visit by Provider         Review of Systems   ROS COMP: Constitutional, HEENT, cardiovascular, pulmonary, gi and gu systems are negative, except as otherwise noted.      Objective    /82   Pulse 100   Temp 98.1  F (36.7  C) (Oral)   Ht 1.702 m (5' 7\")   Wt 130.6 kg (288 lb)   SpO2 94%   BMI 45.11 kg/m    Body mass index is 45.11 " kg/m .  Physical Exam   GENERAL: healthy, alert and no distress  BACK: no CVA tenderness, no paralumbar tenderness.  She does have some parathoracic tenderness on the left.  No sciatic notch tenderness.    Diagnostic Test Results:  Labs reviewed in Epic  No results found for this or any previous visit (from the past 24 hour(s)).    X-rays of the thoracic and lumbar spine demonstrate diffuse spondylosis.    Assessment & Plan     1. Chronic bilateral low back pain without sciatica  Chronic thoracic and lumbar pain secondary to spondylosis.  She is using primarily ibuprofen and chiropractic care to manage her pain.  She advises me she does not want to continue seeing her chiropractor indefinitely and therefore presented today for a second opinion.  I think she deserves evaluation by 1 of my medical spine specialist to see how we can optimize her pain control.  I think she may likely benefit from physical therapy.  I am not clear if she would benefit from additional imaging however.  May also want to involve our chronic pain clinic and perhaps surgical spine specialty if medical spine feels this is indicated.  I encouraged her to consider use of Tylenol rather than ibuprofen for pain control given the improved safety profile of Tylenol.  - XR Lumbar Spine 2/3 Views; Future  - ORTHO  REFERRAL    2. Chronic bilateral thoracic back pain  As above.  - ORTHO  REFERRAL    3. Morbid obesity (H)  This is undoubtedly contributing to some of her low back pain.  Praised her for her weight loss and encouraged her to continue this.    4. Screening for HIV (human immunodeficiency virus)  Reviewed CDC guidelines for HIV screening once during adulthood.  She believes she had this done many years ago and was negative and declined screening today.    5. Encounter for screening mammogram for breast cancer  She consents to mammography screening today.  Orders were placed for mammogram.  - MA SCREENING DIGITAL BILAT -  "Future  (s+30); Future    6. Spondylosis of lumbar region without myelopathy or radiculopathy  Osteoarthritis of thoracic and lumbar spine demonstrated on x-ray.  She is status post bilateral total knee arthroplasty and has demonstrable osteoarthritis and multiple other joints.  Please see above.  - XR Thoracic Spine 2 Views; Future    7. Pulmonary hypertension (H)  We discussed the role of ACE inhibitors in the treatment of pulmonary hypertension.  Discussed implications of pulmonary hypertension has on development of heart failure and encouraged more aggressive treatment of her hypertension with ACE inhibitor.  She consents to treatment today.  Consider recheck a basic metabolic panel at next clinic appointment.  - lisinopril (PRINIVIL/ZESTRIL) 5 MG tablet; Take 1 tablet (5 mg) by mouth daily  Dispense: 90 tablet; Refill: 3     BMI:   Estimated body mass index is 45.11 kg/m  as calculated from the following:    Height as of this encounter: 1.702 m (5' 7\").    Weight as of this encounter: 130.6 kg (288 lb).   Weight management plan: Discussed healthy diet and exercise guidelines        Work on weight loss  Regular exercise  See Patient Instructions    Return in about 4 weeks (around 10/16/2019) for sports medicine.    Ga Ken Jr, MD  [facility] [place]      "

## 2019-10-08 NOTE — PROGRESS NOTES
[facility]    Date of visit: 10/9/2019    Primary Care Provider: Dr. Ga Ken Jr.    Reason for consultation:    Madyson Murray is a 64 year old female who is seen in consultation today at the request of her primary care physician, Ga Ken Jr..     Consultation and Evaluation for: Medical spine evaluation    Review of [place] Prescription Monitoring Program (): Today I have also reviewed the patient's history of controlled substance use, as provided by [place] licensed pharmacies and prescriber dispensers.       Review of Electronic Chart: Today I have also reviewed available medical information in the patient's medical record at [facility] [place] ([facility]), including relevant provider notes, laboratory work, and imaging.       Chief Complaint:    Left low back pain    Pain history:  Madyson Murray is a 64 year old female who first started having problems with intermittent left low back pain many years ago. Over the past 2 years she has had consistent pain in her left low back. She denies any pain radiating further down her buttock, thigh or into her lower extremities. She denies pain, paresthesias or numbness in her legs. Last year she went to PT and chiropractic at [facility]. This seemed to help but pain relief lasted only about a week. She did get some exercises to do at home but doesn't do these regularly.    Pain is localized to left low back   Radiation of pain: denies  Other associated symptoms: denies  History of cancer or weight loss: denies    Pain is described as Unbearable , sharp  Quality and timing of pain: constant  Pain rating: intensity ranges from 10/10 to 4/10, and Averages 7/10 on a 0-10 scale.  Aggravating factors include: Working, being on her feet all day, lifting  Relieving factors include: Rest  Denies red flags including: bowel or bladder symptoms, fever, chills, saddle anesthesia, profound motor loss, history of cancer, history of  immune compromise, weight loss.     Impact of Pain: There is significant limitation with activity, mobility.    Current medication treatments include:  -Ibuprofen 400mg BID, typically daily, avoids on days she doesn't work        Previous medication treatments included:  none    Other treatments have included:  Madyson Murray has not been seen at a pain clinic in the past.    Behavioral interventions: none  PT: did this last year no long term benefit  Manual Medicine: did this last year, no long term benefit    Interventional:  Acupuncture: none  TENs Unit: none  Injections: none    Past Medical History:  Past Medical History:   Diagnosis Date     Pulmonary hypertension (H)      Tricuspid regurgitation      Past Surgical History:  Past Surgical History:   Procedure Laterality Date     ORTHOPEDIC SURGERY      2010 and 2012     Medications:  Current Outpatient Medications   Medication Sig Dispense Refill     IBUPROFEN PO Take 200 mg by mouth daily 4 tablets daily       lisinopril (PRINIVIL/ZESTRIL) 5 MG tablet Take 1 tablet (5 mg) by mouth daily 90 tablet 3     Allergies:     Allergies   Allergen Reactions     Liquid Adhesive        Social History:  Home situation: Lives in [place]  Occupation/Schooling:Works in Food Reporter dept at [employer]  Tobacco use: denies  Alcohol use: couple drinks/week  Drug use: denies    Chemical dependency: The patient denies any history of treatment for alcohol or drug abuse.    Family history:  Family History   Problem Relation Age of Onset     Hypertension Mother      Diabetes Father      Hypertension Father      Parkinsonism Father      Diabetes Sister      Hypertension Sister      Sleep Apnea Sister      Sleep Apnea Brother              Review of Systems:    POSTIVE IN BOLD  GENERAL: fever/chills, fatigue, general unwell feeling, weight gain/loss.  HEAD/EYES:  headache, dizziness, or vision changes.    EARS/NOSE/THROAT:  Nosebleeds, hearing loss, sinus infection, earache,  tinnitus.  IMMUNE:  Allergies, cancer, immune deficiency, or infections.  SKIN:  Urticaria, rash, hives  HEME/Lymphatic:   anemia, easy bruising, easy bleeding.  RESPIRATORY:  cough, wheezing, or shortness of breath  CARDIOVASCULAR/Circulation:  Extremity edema, syncope, hypertension, tachycardia, or angina.  GASTROINTESTINAL:  abdominal pain, nausea/emesis, diarrhea, constipation,  hematochezia, or melena.  ENDOCRINE:  Diabetes, steroid use,  thyroid disease or osteoporosis.  MUSCULOSKELETAL: neck pain, back pain, arthralgia, arthritis, or gout.  GENITOURINARY:  frequency, urgency, dysuria, difficulty voiding, hematuria or incontinence.  NEUROLOGIC:  weakness, numbness, paresthesias, seizure, tremor, stroke or memory loss.  PSYCHIATRIC:  depression, anxiety, stress, suicidal thoughts or mood swings.     Physical Exam:  Vitals:    10/09/19 1047 10/09/19 1052   BP: (!) 164/82 (!) 148/78   Pulse: 73    SpO2: 96%    Weight: 135.2 kg (298 lb 1.6 oz)      Exam:  Constitutional: healthy, alert and no distress  Head: normocephalic. Atraumatic.   Eyes: no redness or jaundice noted   ENT: oropharnx normal.  MMM.  Neck supple.    Cardiovascular: RRR no m/g/r   Respiratory: clear bilaterally  Gastrointestinal: soft, non-tender, normoactive bowel sounds   Skin: no suspicious lesions or rashes  Psychiatric: mentation appears normal and affect normal/bright    Musculoskeletal exam:  Gait/Station/Posture: Gait is normal, no antalgia. Good stride and arm swing.   Cervical spine: normal ROM  Thoracic spine:  Normal ROM  Lumbar spine: Mildly reduced in extension and rotation bilaterally. Extension and rotation to both sides causes pain in the left low back.  Myofascial tenderness:  Left lower thoracic/upper lumbar paraspinals.   Straight leg exam: neg  Octavio's maneuver: neg  Sacroiliac (SI) joint tenderness: neg  Greater trochanteric tenderness: neg  Piriformis tenderness: neg  Bilateral hip motion without discomfort     Neurologic  exam:  Motor:  5/5 UE and LE strength  Reflexes:     Biceps:     R:  2/4 L: 2/4   Brachioradialis   R:  2/4 L: 2/4   Triceps:  R:  2/4 L: 2/4   Patella:  R:  2/4 L: 2/4   Achilles:  R:  2/4 L: 2/4   Sensory:  (upper and lower extremities):   Light touch: normal     Diagnostic tests:   THORACIC SPINE TWO VIEWS 9/18/2019 4:13 PM      HISTORY: Spondylosis of lumbar region without myelopathy or  radiculopathy     COMPARISON: None.     FINDINGS: There is normal osseous alignment.  No fractures are  identified.  There is mild curvature of the spine convex towards the  right. There is loss of disc space height throughout the thoracic  spine. Prominent anterior and lateral osteophytes are seen throughout  the thoracic spine.                                                                      IMPRESSION: Multilevel degenerative change.     ÓSCAR LOPEZ MD    LUMBAR SPINE TWO-THREE  VIEWS  9/18/2019 4:11 PM      HISTORY: Chronic bilateral low back pain without sciatica; Chronic  bilateral low back pain without sciatica     COMPARISON: None.     FINDINGS: No fractures are identified. There is scoliosis convex  towards the left. There is loss of disc space height throughout the  lumbar spine. There are degenerative changes in the facet joints..                                                                      IMPRESSION: Multilevel degenerative change.     ÓSCAR LOPEZ MD    Screening tools:  Oswestry score is 13 corresponding to a 26% disability    Assessment:  Madyson Murray is a 64 year old female with a past medical history significant for pulmonary hypertension, DAE, tricuspid valve insufficiency and obesity who presents with complaints of chronic left low back pain.     Exam shows pain with palpation of the left thoracolumbar paraspinals from T11-L1. Pain with extension/rotation bilaterally in the left lumbar paraspinals. She is neurologically intact without any signs or symptoms of radiculopathy, myelopathy or cauda  equina pathology.    Based on history and exam her symptoms are likely myofascial in nature, essentially a thoracolumbar muscle strain. The differential also includes degenerative disk disease and spondylosis.    Plan:  Diagnosis reviewed, treatment options addressed, and risks/benefits discussed. The patient was involved in shared decision making regarding the plan as laid out below.    1. Education: The patient was educated as to the natural history of their disorder. Reassurance was given and the patient was encouraged to engage in activity and movement as able.  2. Physical Therapy: Will refer based on MRI results  3. Diagnostic Studies:  Thoracic and Lumbar MRI ordered due to the persistent nature of symptoms despite conservative management.  4. Medication Management:    1. Start methocarbamol 500mg. Take initially when not expecting to work or drive to ensure that there aren't significant side effects. If no side effects, can take 3-4 times daily as needed.  2. Can continue ibuprofen 400mg bid prn.  5. Further procedures recommended: Will recommend based on MRI results  6. Recommendations/follow-up for PCP:  Encourage activity and engagement with physical therapy.  7. Follow up: as needed or 1 month post procedure in clinic.          Total time spent face to face was 30 minutes and more than 50% of face to face time was spent in counseling and/or coordination of care regarding the diagnosis and recommendations above.      Colt Tatum D.O.  Medical Spine Specialist  [facility]

## 2019-10-09 ENCOUNTER — OFFICE VISIT (OUTPATIENT)
Dept: PALLIATIVE MEDICINE | Facility: CLINIC | Age: 64
End: 2019-10-09
Payer: COMMERCIAL

## 2019-10-09 VITALS
SYSTOLIC BLOOD PRESSURE: 148 MMHG | DIASTOLIC BLOOD PRESSURE: 78 MMHG | WEIGHT: 293 LBS | OXYGEN SATURATION: 96 % | BODY MASS INDEX: 46.69 KG/M2 | HEART RATE: 73 BPM

## 2019-10-09 DIAGNOSIS — M54.50 CHRONIC LEFT-SIDED LOW BACK PAIN WITHOUT SCIATICA: Primary | ICD-10-CM

## 2019-10-09 DIAGNOSIS — G89.29 CHRONIC LEFT-SIDED LOW BACK PAIN WITHOUT SCIATICA: Primary | ICD-10-CM

## 2019-10-09 PROCEDURE — 99207 ZZC CONSULT E&M CHANGED TO INITIAL LEVEL: CPT | Performed by: PHYSICAL MEDICINE & REHABILITATION

## 2019-10-09 PROCEDURE — 99204 OFFICE O/P NEW MOD 45 MIN: CPT | Performed by: PHYSICAL MEDICINE & REHABILITATION

## 2019-10-09 RX ORDER — METHOCARBAMOL 500 MG/1
500 TABLET, FILM COATED ORAL 4 TIMES DAILY PRN
Qty: 120 TABLET | Refills: 0 | Status: SHIPPED | OUTPATIENT
Start: 2019-10-09 | End: 2019-12-12

## 2019-10-09 NOTE — Clinical Note
Thanks for the referral. Ordered thoracic and lumbar MRI, will refer to PT and consider injections based on the results. [facility]

## 2019-10-09 NOTE — PATIENT INSTRUCTIONS
1. Call [phone] to schedule your MRIs    2. I ordered methocarbamol, this is a muscle relaxant that can make your drowsy and tired. Take this initially on a day after work and when you're not expecting to drive. If you dont have any side effects you can take this 3-4 times daily as needed.    3. I'll call you with the MRI results and we'll discuss the next steps (which type of PT and whether an injection might help).        ----------------------------------------------------------------  Clinic Number:  [phone]     Call with any questions about your care and for scheduling assistance.     Calls are returned Monday through Friday between 8 AM and 4:30 PM. We usually get back to you within 2 business days depending on the issue/request.    If we are prescribing your medications:    For opioid medication refills, call the clinic or send a [employer] message 7 days in advance.  Please include:    Name of requested medication    Name of the pharmacy.    For non-opioid medications, call your pharmacy directly to request a refill. Please allow 3-4 days to be processed.     Per [place] State Law:    All controlled substance prescriptions must be filled within 30 days of being written.      For those controlled substances allowing refills, pickup must occur within 30 days of last fill.      We believe regular attendance is key to your success in our program!      Any time you are unable to keep your appointment we ask that you call us at least 24 hours in advance to cancel.This will allow us to offer the appointment time to another patient.     Multiple missed appointments may lead to dismissal from the clinic.

## 2019-10-16 ENCOUNTER — ANCILLARY PROCEDURE (OUTPATIENT)
Dept: MAMMOGRAPHY | Facility: CLINIC | Age: 64
End: 2019-10-16
Attending: FAMILY MEDICINE
Payer: COMMERCIAL

## 2019-10-16 ENCOUNTER — HOSPITAL ENCOUNTER (OUTPATIENT)
Dept: MRI IMAGING | Facility: CLINIC | Age: 64
Discharge: HOME | End: 2019-10-16
Attending: PHYSICAL MEDICINE & REHABILITATION | Admitting: PHYSICAL MEDICINE & REHABILITATION
Payer: COMMERCIAL

## 2019-10-16 ENCOUNTER — HOSPITAL ENCOUNTER (OUTPATIENT)
Dept: MRI IMAGING | Facility: CLINIC | Age: 64
End: 2019-10-16
Attending: PHYSICAL MEDICINE & REHABILITATION
Payer: COMMERCIAL

## 2019-10-16 DIAGNOSIS — M54.50 CHRONIC LEFT-SIDED LOW BACK PAIN WITHOUT SCIATICA: ICD-10-CM

## 2019-10-16 DIAGNOSIS — G89.29 CHRONIC LEFT-SIDED LOW BACK PAIN WITHOUT SCIATICA: ICD-10-CM

## 2019-10-16 DIAGNOSIS — Z12.31 ENCOUNTER FOR SCREENING MAMMOGRAM FOR BREAST CANCER: ICD-10-CM

## 2019-10-16 DIAGNOSIS — M99.43 CONNECTIVE TISSUE STENOSIS OF NEURAL CANAL OF LUMBAR REGION: Primary | ICD-10-CM

## 2019-10-16 PROCEDURE — 77067 SCR MAMMO BI INCL CAD: CPT | Mod: TC

## 2019-10-16 PROCEDURE — 72146 MRI CHEST SPINE W/O DYE: CPT

## 2019-10-16 PROCEDURE — 72148 MRI LUMBAR SPINE W/O DYE: CPT

## 2019-10-16 NOTE — RESULT ENCOUNTER NOTE
Discussed results of lumbar and thoracic MRI with patient. Recommended PT and an epidural injection and she was agreeable to this plan.    DO [facility]

## 2019-10-17 NOTE — RESULT ENCOUNTER NOTE
Dear Madyson,      Your recent test results are noted below:    -Mammogram was normal. Please continue with routine annual mammography for ongoing screenings.    For additional lab test information, [email or web address] is an excellent reference. Please contact the clinic at [phone] with any further questions or concerns.    Sincerely,      Maria T Khan PA-C  [facility]

## 2019-10-18 ENCOUNTER — THERAPY VISIT (OUTPATIENT)
Dept: PHYSICAL THERAPY | Facility: CLINIC | Age: 64
End: 2019-10-18
Payer: COMMERCIAL

## 2019-10-18 DIAGNOSIS — M54.50 CHRONIC LEFT-SIDED LOW BACK PAIN WITHOUT SCIATICA: ICD-10-CM

## 2019-10-18 DIAGNOSIS — M99.43 CONNECTIVE TISSUE STENOSIS OF NEURAL CANAL OF LUMBAR REGION: ICD-10-CM

## 2019-10-18 DIAGNOSIS — G89.29 CHRONIC LEFT-SIDED LOW BACK PAIN WITHOUT SCIATICA: ICD-10-CM

## 2019-10-18 PROCEDURE — 97110 THERAPEUTIC EXERCISES: CPT | Mod: GP | Performed by: PHYSICAL THERAPIST

## 2019-10-18 PROCEDURE — 97161 PT EVAL LOW COMPLEX 20 MIN: CPT | Mod: GP | Performed by: PHYSICAL THERAPIST

## 2019-10-18 NOTE — PROGRESS NOTES
[facility] Initial Evaluation  Subjective:  The history is provided by the patient. No  was used.   Madyson Murray being seen for left low back pain. Pain started awhile ago and has progressively gotten worse. .   Problem began 10/18/2016. Where condition occurred: at work.Problem occurred: insidious onset. No mechanism of injury.  and reported as 8/10 on pain scale. General health as reported by patient is good. Pertinent medical history includes:  High blood pressure, overweight and sleep disorder/apnea. Other medical history details: headaches.    Surgeries include:  Orthopedic surgery. Other surgery history details: Both knees replaced.  Current medications:  High blood pressure medication and muscle relaxants.   Primary job tasks include:  Prolonged standing, lifting/carrying and repetitive tasks.  Pain is described as aching and is intermittent. Pain is worse during the night. Since onset symptoms are gradually worsening. Special tests:  X-ray and MRI (Imaging reveals degenerative changes in the spine). Previous treatment includes physical therapy and chiropractic. There was moderate improvement following previous treatment.   Patient is [employer]. Restrictions include:  Working in normal job without restrictions.    Barriers include:  None as reported by patient.  Red flags:  None as reported by patient.  Type of problem:  Lumbar        Patient reports pain:  Lumbar spine left and upper lumbar spine. Radiates to:  No radiation. Associated symptoms:  Loss of motion/stiffness, loss of strength and loss of motion. Symptoms are exacerbated by bending, certain positions, lying down, carrying, walking and standing and relieved by rest.                      Objective:  Standing Alignment:        Lumbar:  Anterior pelvic tilt  Pelvic:  Normal          Gait:    Gait Type:  Antalgic   Assistive Devices:  None  Deviations:  Lumbar:  Trunk lean R               Lumbar/SI  Evaluation  ROM:    AROM Lumbar:   Flexion:            To knees, pain on the way down, no pain at bottom, feels good coming back up  Ext:                    25%, painful to extend   Side Bend:        Left:  25%, painful    Right:  WNL, no pain  Rotation:           Left:  WNL, no pain    Right:  WNL, no pain  Side Glide:        Left:     Right:           Lumbar Myotomes:    T12-L3 (Hip Flex):  Left: 4+    Right: 4+  L2-4 (Quads):  Left:  5    Right:  5  L4 (Ankle DF):  Left:  5    Right:  5  L5 (Great Toe Ext): Left: 5    Right: 5     Lumbar DTR's:  not assessed      Cord Signs:  not assessed    Lumbar Dermtomes:  normal                Neural Tension/Mobility:  Lumbar:  Normal        Lumbar Palpation:    Tenderness present at Left:    Quadratus Lumborum; Erector Spinae and Gluteus Medius  Tenderness not present at Left:    Piriformis or Hamstrings  Tenderness present at Right: Gluteus Medius  Tenderness not present at Right:  Quadratus Lumborum; Erector Spinae; Piriformis or Hamstrings  Functional Tests:  Core strength and proprioception lumbar: PPT: Pt able to maintain for 5sec before fatiguing. Hold breath during exercise.        Lumbar Provocation:  Lumbar provocation: Unable to lie prone.                                                     General     ROS    Assessment/Plan:    Patient is a 64 year old female with lumbar complaints.    Patient has the following significant findings with corresponding treatment plan.                Diagnosis 1:  Mechanical low back pain  Pain -  hot/cold therapy, US, electric stimulation, mechanical traction, manual therapy, self management, education, directional preference exercise and home program  Decreased ROM/flexibility - manual therapy, therapeutic exercise and home program  Decreased joint mobility - manual therapy, therapeutic exercise and home program  Decreased strength - therapeutic exercise, therapeutic activities and home program  Decreased function - therapeutic  activities and home program  Impaired posture - neuro re-education and home program      Therapy Evaluation Codes:   1) History comprised of:   Personal factors that impact the plan of care:      Profession, Time since onset of symptoms and poor activity level.    Comorbidity factors that impact the plan of care are:      Overweight and Sleep disorder/apnea.     Medications impacting care: High blood pressure and Muscle relaxant.  2) Examination of Body Systems comprised of:   Body structures and functions that impact the plan of care:      Lumbar spine.   Activity limitations that impact the plan of care are:      Bending, Lifting, Sitting, Squatting/kneeling, Stairs, Standing, Walking, Working, Sleeping and Laying down.  3) Clinical presentation characteristics are:   Stable/Uncomplicated.  4) Decision-Making    Low complexity using standardized patient assessment instrument and/or measureable assessment of functional outcome.  Cumulative Therapy Evaluation is: Low complexity.    Previous and current functional limitations:  (See Goal Flow Sheet for this information)    Short term and Long term goals: (See Goal Flow Sheet for this information)     Communication ability:  Patient appears to be able to clearly communicate and understand verbal and written communication and follow directions correctly.  Treatment Explanation - The following has been discussed with the patient:   RX ordered/plan of care  Anticipated outcomes  Possible risks and side effects  This patient would benefit from PT intervention to resume normal activities.   Rehab potential is good.    Frequency:  1 X week, once daily  Duration:  for 8 weeks  Discharge Plan:  Achieve all LTG.  Independent in home treatment program.  Reach maximal therapeutic benefit.    Please refer to the daily flowsheet for treatment today, total treatment time and time spent performing 1:1 timed codes.

## 2019-10-25 ENCOUNTER — THERAPY VISIT (OUTPATIENT)
Dept: PHYSICAL THERAPY | Facility: CLINIC | Age: 64
End: 2019-10-25
Payer: COMMERCIAL

## 2019-10-25 DIAGNOSIS — G89.29 CHRONIC LEFT-SIDED LOW BACK PAIN WITHOUT SCIATICA: Primary | ICD-10-CM

## 2019-10-25 DIAGNOSIS — M54.50 CHRONIC LEFT-SIDED LOW BACK PAIN WITHOUT SCIATICA: Primary | ICD-10-CM

## 2019-10-25 PROCEDURE — 97110 THERAPEUTIC EXERCISES: CPT | Mod: GP | Performed by: PHYSICAL THERAPIST

## 2019-10-25 PROCEDURE — 97530 THERAPEUTIC ACTIVITIES: CPT | Mod: GP | Performed by: PHYSICAL THERAPIST

## 2019-10-25 PROCEDURE — 97140 MANUAL THERAPY 1/> REGIONS: CPT | Mod: GP | Performed by: PHYSICAL THERAPIST

## 2019-10-30 ENCOUNTER — HOSPITAL ENCOUNTER (OUTPATIENT)
Dept: GENERAL RADIOLOGY | Facility: CLINIC | Age: 64
End: 2019-10-30
Attending: PAIN MEDICINE | Admitting: PAIN MEDICINE
Payer: COMMERCIAL

## 2019-10-30 ENCOUNTER — HOSPITAL ENCOUNTER (OUTPATIENT)
Facility: CLINIC | Age: 64
Discharge: HOME | End: 2019-10-30
Attending: PAIN MEDICINE | Admitting: PAIN MEDICINE
Payer: COMMERCIAL

## 2019-10-30 VITALS — SYSTOLIC BLOOD PRESSURE: 150 MMHG | DIASTOLIC BLOOD PRESSURE: 65 MMHG | HEART RATE: 67 BPM | OXYGEN SATURATION: 94 %

## 2019-10-30 DIAGNOSIS — M54.16 LUMBAR RADICULOPATHY: ICD-10-CM

## 2019-10-30 PROCEDURE — 25000125 ZZHC RX 250: Performed by: PAIN MEDICINE

## 2019-10-30 PROCEDURE — 25000128 H RX IP 250 OP 636: Performed by: PAIN MEDICINE

## 2019-10-30 PROCEDURE — 40000863 ZZH STATISTIC RADIOLOGY XRAY, US, CT, MAR, NM

## 2019-10-30 PROCEDURE — 62323 NJX INTERLAMINAR LMBR/SAC: CPT | Mod: TC

## 2019-10-30 PROCEDURE — 25500064 ZZH RX 255 OP 636: Performed by: PAIN MEDICINE

## 2019-10-30 RX ORDER — IOPAMIDOL 408 MG/ML
10 INJECTION, SOLUTION INTRATHECAL ONCE
Status: COMPLETED | OUTPATIENT
Start: 2019-10-30 | End: 2019-10-30

## 2019-10-30 RX ORDER — DEXAMETHASONE SODIUM PHOSPHATE 10 MG/ML
10 INJECTION, SOLUTION INTRAMUSCULAR; INTRAVENOUS ONCE
Status: DISCONTINUED | OUTPATIENT
Start: 2019-10-30 | End: 2019-10-30 | Stop reason: CLARIF

## 2019-10-30 RX ORDER — BUPIVACAINE HYDROCHLORIDE 2.5 MG/ML
2 INJECTION, SOLUTION EPIDURAL; INFILTRATION; INTRACAUDAL ONCE
Status: COMPLETED | OUTPATIENT
Start: 2019-10-30 | End: 2019-10-30

## 2019-10-30 RX ORDER — TRIAMCINOLONE ACETONIDE 40 MG/ML
40 INJECTION, SUSPENSION INTRA-ARTICULAR; INTRAMUSCULAR ONCE
Status: COMPLETED | OUTPATIENT
Start: 2019-10-30 | End: 2019-10-30

## 2019-10-30 RX ADMIN — TRIAMCINOLONE ACETONIDE 40 MG: 40 INJECTION, SUSPENSION INTRA-ARTICULAR; INTRAMUSCULAR at 10:20

## 2019-10-30 RX ADMIN — BUPIVACAINE HYDROCHLORIDE 2 ML: 2.5 INJECTION, SOLUTION EPIDURAL; INFILTRATION; INTRACAUDAL at 10:20

## 2019-10-30 RX ADMIN — IOPAMIDOL 1 ML: 408 INJECTION, SOLUTION INTRATHECAL at 10:21

## 2019-10-30 RX ADMIN — LIDOCAINE HYDROCHLORIDE 4 ML: 10 INJECTION, SOLUTION EPIDURAL; INFILTRATION; INTRACAUDAL; PERINEURAL at 10:18

## 2019-10-30 NOTE — DISCHARGE INSTRUCTIONS
[facility] Procedure Discharge Instructions    Today you saw:  Dr Aubrey Gurrola  -  Dr Colt Tatum    Procedure:  Epidural Steroid Injection  -  Sacroiliac Joint Injection  -  Facet Joint Injection  -  Hip Injection  -  Piriformis Injection     Medications used:  Lidocaine  -  Bupivacaine  -  Dexamethasone  -  Kenalog -  Depo-medrol  -  Isovue M200  - Ropivicaine  -  Omniscan  -  Normal saline    After you go home:      You may resume your normal diet    It is recommended that a responsible adult stay with you for 6 hours if you received sedation       If you received sedation before, during or after your procedure:      For 24 hours -     Relax and take it easy    Do NOT make any important or legal decisions    Do NOT drive or operate machines at home or at work    Do NOT drink alcohol    Care of Puncture Site:      If you have a bandaid on your puncture site, you may remove it the next morning    You may shower     No bath tubs, whirlpools or swimming for 24 hours after your procedure     Activity:      You may go back to normal activity in 24 hours    Avoid strenuous activity for the first 24 hours.    Be cautious when walking. Numbness and/or weakness in the lower extremities may occur for up to 6-8 hours after the procedure due to effect of the numbing medication used.    Do not drive for 6 hours.  The effect of the numbing medication could slow your reflexes.    Medicines:      You may resume all medications    For minor pain, you may use anti-inflammatory medications - (such as Ibuprofen, Aleve, Advil) or Acetaminophen (Tylenol) for pain control if necessary.    Pain:       You may have a mild to moderate increase in pain for several days following the injection.    It may take up to 14 days for the steroid medication to start working although you may feel the effect as early as a few days after the procedure.    You may use ice packs for 10-15 minutes, 3 to 4 times a day at the  injection site for comfort.    Do not use heat to painful areas for 6 to 8 hours.  This will give the numbing medication time to wear off and prevent you from accidentally burning your skin.    Call the Pain Clinic if you experience any of the following:      You have chills or a fever greater than 100 F     Swelling, bleeding, redness, drainage, warmth at the injection site    Progressive weakness or numbness in your legs or arms    If lumbar, call if you have a loss of bowel or bladder function    If cervical, call if you have any unusual headache that is not relieved by Tylenol or other pain medication    Unusual new onset of pain that is not improving      If you have questions call:        Pain Clinic @ [phone] during business hours  Monday-Thursday 8 AM-5:30 PM and Friday 8 AM-4:30 PM    Provider Line @ [phone] after hours

## 2019-10-30 NOTE — PROGRESS NOTES
[facility] post Lumbar Epidural / Discharge Nursing Note  Post Procedure    Education/questions answered: Post Lumbar epidural/ [facility] AVS reviewed and given   Post procedure noted BP to be elevated- face and chest flushed. C/o 5/10 Lumbar pain Patient DC location: Door [address] to home  Accompanied by: friend  CS discharge time: 1100

## 2019-12-12 ENCOUNTER — ANCILLARY PROCEDURE (OUTPATIENT)
Dept: GENERAL RADIOLOGY | Facility: CLINIC | Age: 64
End: 2019-12-12
Attending: FAMILY MEDICINE
Payer: COMMERCIAL

## 2019-12-12 ENCOUNTER — OFFICE VISIT (OUTPATIENT)
Dept: FAMILY MEDICINE | Facility: CLINIC | Age: 64
End: 2019-12-12
Payer: COMMERCIAL

## 2019-12-12 VITALS
TEMPERATURE: 97.8 F | BODY MASS INDEX: 45.99 KG/M2 | OXYGEN SATURATION: 95 % | WEIGHT: 293 LBS | HEIGHT: 67 IN | SYSTOLIC BLOOD PRESSURE: 136 MMHG | HEART RATE: 86 BPM | DIASTOLIC BLOOD PRESSURE: 70 MMHG

## 2019-12-12 DIAGNOSIS — Z12.11 SPECIAL SCREENING FOR MALIGNANT NEOPLASMS, COLON: ICD-10-CM

## 2019-12-12 DIAGNOSIS — I10 HYPERTENSION GOAL BP (BLOOD PRESSURE) < 140/90: ICD-10-CM

## 2019-12-12 DIAGNOSIS — Z11.4 SCREENING FOR HIV (HUMAN IMMUNODEFICIENCY VIRUS): ICD-10-CM

## 2019-12-12 DIAGNOSIS — Z23 NEED FOR PROPHYLACTIC VACCINATION AND INOCULATION AGAINST INFLUENZA: ICD-10-CM

## 2019-12-12 DIAGNOSIS — R05.3 CHRONIC COUGH: Primary | ICD-10-CM

## 2019-12-12 DIAGNOSIS — R05.3 CHRONIC COUGH: ICD-10-CM

## 2019-12-12 PROCEDURE — 90471 IMMUNIZATION ADMIN: CPT | Performed by: FAMILY MEDICINE

## 2019-12-12 PROCEDURE — 87389 HIV-1 AG W/HIV-1&-2 AB AG IA: CPT | Performed by: FAMILY MEDICINE

## 2019-12-12 PROCEDURE — 36415 COLL VENOUS BLD VENIPUNCTURE: CPT | Performed by: FAMILY MEDICINE

## 2019-12-12 PROCEDURE — 80048 BASIC METABOLIC PNL TOTAL CA: CPT | Performed by: FAMILY MEDICINE

## 2019-12-12 PROCEDURE — 90682 RIV4 VACC RECOMBINANT DNA IM: CPT | Performed by: FAMILY MEDICINE

## 2019-12-12 PROCEDURE — 99214 OFFICE O/P EST MOD 30 MIN: CPT | Mod: 25 | Performed by: FAMILY MEDICINE

## 2019-12-12 PROCEDURE — 71046 X-RAY EXAM CHEST 2 VIEWS: CPT | Mod: FY

## 2019-12-12 RX ORDER — LOSARTAN POTASSIUM 25 MG/1
25 TABLET ORAL DAILY
Qty: 90 TABLET | Refills: 1 | Status: SHIPPED | OUTPATIENT
Start: 2019-12-12 | End: 2020-06-23

## 2019-12-12 ASSESSMENT — MIFFLIN-ST. JEOR: SCORE: 1930.74

## 2019-12-12 NOTE — PROGRESS NOTES
"Subjective     Madyson Murray is a 64 year old female who presents to clinic today for the following health issues:    HPI   Hypertension Follow-up      Do you check your blood pressure regularly outside of the clinic? No     Are you following a low salt diet? Yes    Are your blood pressures ever more than 140 on the top number (systolic) OR more   than 90 on the bottom number (diastolic), for example 140/90? Yes in visits      How many servings of fruits and vegetables do you eat daily?  0-1 maybe 2    On average, how many sweetened beverages do you drink each day (Examples: soda, juice, sweet tea, etc.  Do NOT count diet or artificially sweetened beverages)?   2    How many days per week do you miss taking your medication? Pt could not give a number     Went to [facility] a few weeks ago due to a two month history of cough.  Didn't think this was infectious and concern was this is side effects from her ACE inhibitor.  No associated URI symptoms.     Due for several preventative cares:  Discussed CDC recommendation for screening for HIV at least once during adulthood.   FIT test to screen for colon cancer  Flu shot          Reviewed and updated as needed this visit by Provider         Review of Systems   ROS COMP: Constitutional, HEENT, cardiovascular, pulmonary, gi and gu systems are negative, except as otherwise noted.      Objective    /70   Pulse 86   Temp 97.8  F (36.6  C) (Oral)   Ht 1.702 m (5' 7\")   Wt 133.8 kg (295 lb)   SpO2 95%   BMI 46.20 kg/m    Body mass index is 46.2 kg/m .  Physical Exam   GENERAL: healthy, alert and no distress  EYES: Eyes grossly normal to inspection, PERRL and conjunctivae and sclerae normal  HENT: ear canals and TM's normal, nose and mouth without ulcers or lesions  NECK: no adenopathy, no asymmetry, masses, or scars and thyroid normal to palpation  RESP: lungs clear to auscultation - no rales, rhonchi or wheezes  CV: regular rate and rhythm, normal S1 S2, no S3 or " S4, no murmur, click or rub, no peripheral edema and peripheral pulses strong  ABDOMEN: soft, nontender, no hepatosplenomegaly, no masses and bowel sounds normal  MS: no gross musculoskeletal defects noted, no edema    Diagnostic Test Results:  Labs reviewed in Epic  CXR - negative        Assessment & Plan     1. Chronic cough  Reviewed Ddx for chronic cough.  Doubt infectious etiology with no concurrent URI symptoms or fever.  No wheezing on exam today decreasing (but not eliminating) likelihood of asthma.  No other dyspepsia symptoms so GERD less likely.  Will do trial off ACE and see if this resolves symptoms.   If no improvement in 2 weeks, return for spirometry evaluation.  - XR Chest 2 Views; Future    2. Screening for HIV (human immunodeficiency virus)  Consents to screening.  - HIV Antigen Antibody Combo    3. Special screening for malignant neoplasms, colon  Due for FIT.  - Fecal colorectal cancer screen (FIT); Future    4. Hypertension goal BP (blood pressure) < 140/90  Blood pressure at goal..  Change from ACE to AARB due to concern of ACE-induced cough.  - Basic metabolic panel  (Ca, Cl, CO2, Creat, Gluc, K, Na, BUN)  - losartan (COZAAR) 25 MG tablet; Take 1 tablet (25 mg) by mouth daily  Dispense: 90 tablet; Refill: 1    5. Need for prophylactic vaccination and inoculation against influenza  Updated.  - INFLUENZA QUAD, RECOMBINANT, P-FREE (RIV4) (FLUBLOCK) [37780]  - Vaccine Administration, Initial [10029]       Work on weight loss    Return in about 2 weeks (around 12/26/2019), or if symptoms worsen or fail to improve.    Ga Ken Jr, MD  [facility]

## 2019-12-13 LAB
ANION GAP SERPL CALCULATED.3IONS-SCNC: 6 MMOL/L (ref 3–14)
BUN SERPL-MCNC: 17 MG/DL (ref 7–30)
CALCIUM SERPL-MCNC: 9.1 MG/DL (ref 8.5–10.1)
CHLORIDE SERPL-SCNC: 108 MMOL/L (ref 94–109)
CO2 SERPL-SCNC: 27 MMOL/L (ref 20–32)
CREAT SERPL-MCNC: 0.68 MG/DL (ref 0.52–1.04)
GFR SERPL CREATININE-BSD FRML MDRD: >90 ML/MIN/{1.73_M2}
GLUCOSE SERPL-MCNC: 98 MG/DL (ref 70–99)
HIV 1+2 AB+HIV1 P24 AG SERPL QL IA: NONREACTIVE
POTASSIUM SERPL-SCNC: 3.9 MMOL/L (ref 3.4–5.3)
SODIUM SERPL-SCNC: 141 MMOL/L (ref 133–144)

## 2019-12-16 NOTE — RESULT ENCOUNTER NOTE
Ms. Murray,    -HIV test is normal.    If you have further questions about the interpretation of your labs, [email or web address] is a good website to check out for further information.    Please contact the clinic if you have additional questions.  Thank you.    Sincerely,    Ga Ken MD

## 2019-12-18 ENCOUNTER — APPOINTMENT (OUTPATIENT)
Dept: LAB | Facility: CLINIC | Age: 64
End: 2019-12-18
Payer: COMMERCIAL

## 2020-01-24 NOTE — PROGRESS NOTES
Discharge Note    Progress reporting period is from initial evaluation date (please see noted date below) to Oct 25, 2019.  Linked Episodes   Type: Episode: Status: Noted: Resolved: Last update: Updated by:   PHYSICAL THERAPY Low back pain 10/18/19 Active 10/18/2019  10/25/2019  4:10 PM Brittney Hayward, PT      Comments:       Madyson failed to follow up and current status is unknown.  Please see information below for last relevant information on current status.  Patient seen for 2 visits.    SUBJECTIVE  Subjective changes noted by patient:  Pt reports increased soreness today. Is getting a steroid injection on 10/30. Is hoping that will help with the pain.   .  Current pain level is 9/10.     Previous pain level was  8/10.   Changes in function:  Yes (See Goal flowsheet attached for changes in current functional level)  Adverse reaction to treatment or activity: None    OBJECTIVE  Changes noted in objective findings:       ASSESSMENT/PLAN  Diagnosis: Mechanical low back pain   Updated problem list and treatment plan:   Pain - HEP  STG/LTGs have been met or progress has been made towards goals:  Yes, please see goal flowsheet for most current information  Assessment of Progress: current status is unknown.    Last current status:     Self Management Plans:  HEP  I have re-evaluated this patient and find that the nature, scope, duration and intensity of the therapy is appropriate for the medical condition of the patient.  Madyson continues to require the following intervention to meet STG and LTG's:  HEP.    Recommendations:  Discharge with current home program.  Patient to follow up with MD as needed.    Please refer to the daily flowsheet for treatment today, total treatment time and time spent performing 1:1 timed codes.

## 2020-03-11 ENCOUNTER — HEALTH MAINTENANCE LETTER (OUTPATIENT)
Age: 65
End: 2020-03-11

## 2020-05-06 ENCOUNTER — DOCUMENTATION ONLY (OUTPATIENT)
Dept: SLEEP MEDICINE | Facility: CLINIC | Age: 65
End: 2020-05-06

## 2020-05-06 ENCOUNTER — TELEPHONE (OUTPATIENT)
Dept: SLEEP MEDICINE | Facility: CLINIC | Age: 65
End: 2020-05-06

## 2020-05-06 DIAGNOSIS — G47.33 OSA (OBSTRUCTIVE SLEEP APNEA): Primary | ICD-10-CM

## 2020-05-06 NOTE — PROGRESS NOTES
LOOKED AT PT'S CPAP MACHINE. ONE OF THE SEALS IN HER HUMIDIFIER CAME LOOSE. REINSTATED SEAL AND RAN MACHINE, SOUNDS GOOD AND AIRFLOW IS GOOD. SHOWED PT WHICH SEAL CAME LOOSE IN CASE IT HAPPENS AGAIN.

## 2020-06-01 ENCOUNTER — THERAPY VISIT (OUTPATIENT)
Dept: CHIROPRACTIC MEDICINE | Facility: CLINIC | Age: 65
End: 2020-06-01
Payer: COMMERCIAL

## 2020-06-01 DIAGNOSIS — M53.3 SACRAL PAIN: ICD-10-CM

## 2020-06-01 DIAGNOSIS — M25.511 RIGHT SHOULDER PAIN, UNSPECIFIED CHRONICITY: ICD-10-CM

## 2020-06-01 DIAGNOSIS — M99.02 THORACIC SEGMENT DYSFUNCTION: ICD-10-CM

## 2020-06-01 DIAGNOSIS — M99.04 SOMATIC DYSFUNCTION OF SACRAL REGION: ICD-10-CM

## 2020-06-01 DIAGNOSIS — M99.03 SOMATIC DYSFUNCTION OF LUMBAR REGION: Primary | ICD-10-CM

## 2020-06-01 DIAGNOSIS — G89.29 CHRONIC BILATERAL LOW BACK PAIN WITHOUT SCIATICA: ICD-10-CM

## 2020-06-01 DIAGNOSIS — M54.50 CHRONIC BILATERAL LOW BACK PAIN WITHOUT SCIATICA: ICD-10-CM

## 2020-06-01 PROCEDURE — 98941 CHIROPRACT MANJ 3-4 REGIONS: CPT | Mod: AT | Performed by: CHIROPRACTOR

## 2020-06-01 PROCEDURE — 97110 THERAPEUTIC EXERCISES: CPT | Performed by: CHIROPRACTOR

## 2020-06-01 PROCEDURE — 99203 OFFICE O/P NEW LOW 30 MIN: CPT | Mod: 25 | Performed by: CHIROPRACTOR

## 2020-06-01 NOTE — PROGRESS NOTES
Initial Chiropractic Clinic Visit    PCP: Ga Ken Jr.    Madyson Murray is a 65 year old female who is seen  as a self referral presenting with chronic low back pain and L hip pain . Patient reports that the onset was over 10 years.  When asked, patient denies:, falling, slipping, bending and reaching or sleeping awkwardly. The pt reports chronic low back pain that started gradually over time. She also complains of R shoulder pain with intermittent numbness in the R hand when sitting and sleeping. The px is across the low back and in the L hip area. She has had both her knees replaced. The pt walks with a limp due to pain in the hip and pelvis. She denies radiation of pain in the extremities. The pt has had an epidural with little success. She has had prior Chiropractic with good success. The pt denies weakness in the legs or other unusual sx.  Prior to onset, the patient was able to stand and walk for 6 hours. Patient notes that due to symptoms, they can only stand and walk with pain. Madyson Murray notes   standing rated at a 8/10 painful and prior to this incident it was 5/10 and sleeping rated at a 6/10 difficulty  and prior to this incident it was 5/10.        Injury: There was no injury related to this episode     Location of Pain: B low back and L pelvis  at the following level(s) T5 , T8 , L5 , Sacrum  and PSIS Left   Duration of Pain: over 10 years   Rating of Pain at worst: 8/10  Rating of Pain Currently: 5/10  Symptoms are better with: previous Chiropractic   Symptoms are worse with: standing and sleeping  Additional Features: none      Health History  as reported by the patient:    How does the patient rate their own health:   Good    Current or past medical history:   High blood pressure, Overweight and Sleep disorder/apnea    Medical allergies  Latex    Past Traumas/Surgeries  Orthopedic: both knee replaced    Family History  Family History   Problem Relation Age of Onset     Hypertension  "Mother      Diabetes Father      Hypertension Father      Parkinsonism Father      Diabetes Sister      Hypertension Sister      Sleep Apnea Sister      Sleep Apnea Brother        Medications:  High blood pressure    Occupation:  Produce worker    Primary job tasks:   Lifting/carrying, Prolonged standing and Repetitive tasks    Barriers as home/work:   none    Additional health Issues:     None             Madyson was asked to complete the Oswestry Low Back Disability Index and Keila Start Back screening tool, today in the office.  Disability score: 42.22%. Keel Start Total Score:6 Sub Score: 3     Review of Systems  Musculoskeletal: as above  Remainder of review of systems is negative including constitutional, CV, pulmonary, GI, Skin and Neurologic except as noted in HPI or medical history.    Past Medical History:   Diagnosis Date     Pulmonary hypertension (H)      Tricuspid regurgitation      Past Surgical History:   Procedure Laterality Date     ORTHOPEDIC SURGERY      2010 and 2012     Objective  There were no vitals taken for this visit.      GENERAL APPEARANCE: healthy, alert and no distress   GAIT: NORMAL  SKIN: no suspicious lesions or rashes  NEURO: Normal strength and tone, mentation intact and speech normal  PSYCH:  mentation appears normal and affect normal/bright    R shoulder examination:   ROM: slight decrease in internal and external rotation  Abduction WNL  Flexion and extension WNL    Tenderness to palpation: R subscapularis, R latissimus dorsi  Orthopaedic tests: Negative apprehension test  Negative AC joint compression test  Negative Neer's test for impingement  Negative Sulcus test for instability       Low back exam:    Inspection:  \"     no visible deformity in the low back       normal skin\",    ROM:       limited flexion due to pain       limited extension due to pain    Tender:       paraspinal muscles       B QL, B thoracic paraspinals, L gluteus medius    Non Tender:       remainder of " lumbar spine    Strength:       hip flexion 5/5 Normal       knee extension 5/5 Normal       ankle dorsiflexion 5/5 Normal       ankle plantarflexion 5/5 Normal       dorsiflexion of the great toe 5/5 Normal    Reflexes:       patellar (L3, L4) 2 bilaterally       achilles tendons (S1) 2 bilaterally    Sensation:      grossly intact throughout lower extremities    Special tests:  Kemps - Right positive, low back pain and Left positive, low back pain, SLR - Right negative and Left negative, Gaenslen's - Right negative and Left negative and Fabere - Right negative and Left positive, hip pain, pelvic and low back pain      Segmental spinal dysfunction/restrictions found at:  T5 , T8 , L5 , Sacrum  and PSIS Left       The following soft tissue hypotonicities were observed:   Gluteal: left, ache and dull pain, referred pain: no  Quad lumb: bilateral, ache and dull pain, referred pain: no  T paraspinals: bilateral, ache and dull pain, no    Trigger points were found in:  None     Muscle spasm found in:  Gluteal, Lumbar erector spine, Quad lumb and T-spine paraspinal      Radiology:  Narrative & Impression     MRI LUMBAR SPINE WITHOUT CONTRAST   10/16/2019 11:21 AM      HISTORY: History of left-sided back pain at thoracolumbar junction for  3 years.      TECHNIQUE: Multiplanar multisequence MRI of the lumbar spine without  contrast.     COMPARISON: Radiographs 9/18/2019     FINDINGS: Radiographs show five lumbar-type vertebral bodies with  scoliosis convex to the left.  The distal spinal cord and cauda equina  appear normal.      T12-L1:  Mild degenerative disc disease, otherwise normal.     L1-L2:  Severe degenerative disc disease, minimal impression on the  thecal sac, otherwise normal.     L2-L3:  Severe degenerative disc disease, minimal impression on the  thecal sac, mild bilateral foraminal stenosis, otherwise normal.     L3-L4:  Moderate degenerative disc disease, worse laterally on the  right. Mild right foraminal  stenosis. Bilateral moderate degenerative  facet arthropathy.     L4-L5:  Grade 1 spondylolisthesis, mild degenerative disc disease,  mild spinal canal stenosis, moderate degenerative facet arthropathy  bilaterally, mild bilateral foraminal stenosis.     L5-S1:  Mild degenerative disc disease, worse on left. Bilateral  moderate degenerative facet arthropathy. Normal spinal canal. Severe  left foraminal stenosis and mild right foraminal stenosis.     Paraspinous soft tissues:   Normal.       Bone marrow:   Normal.                                                                       IMPRESSION:  Scoliosis and multilevel degenerative disc disease and  degenerative facet arthropathy as described above.     YOVANI KIM MD         Assessment:    1. Somatic dysfunction of lumbar region    2. Chronic bilateral low back pain without sciatica    3. Somatic dysfunction of sacral region    4. Sacral pain    5. Thoracic segment dysfunction    6. Right shoulder pain, unspecified chronicity        RX ordered/plan of care  Anticipated outcomes  Possible risks and side effects    After discussing the risk and benefits of care, patient consented to treatment    Prognosis: Good      Patient's condition:  Patient had restrictions pre-manipulation    Treatment effectiveness:  Post manipulation there is better intersegmental movement and Patient claims to feel looser post manipulation      Plan:    Procedures:  Evaluation and Management:  47507 Moderate level exam 30 min    CMT:  07291 Chiropractic manipulative treatment 3-4 regions performed   Thoracic: Activator, Diversified, T5, T8, Prone  Lumbar: Activator, L5, Prone  Pelvis: Drop Table, Sacrum , PSIS Left , Prone  No side posture due to inability to bend knees.     Modalities:  None performed this visit    Therapeutic procedures:  MFR/Active stretch to the R R/C and R subscapularis   Gave doorway pectoralis stretch in 3 different positions starting at 90 degree angles from body  and raising arms 3 levels higher with demonstration.    Gave latissimus stretch overhead and pulling the elbow behind the head with demonstration as per stretch protocol.   8 minutes     Response to Treatment  No increase in sx, pt stable       Treatment plan and goals:  Goals:  SLEEPING: the patient specific goal is to attain his pre-injury status of 5-6 hours comfortably  STANDING: the patient specific goal is to attain the pre-injury status of 10-12 hours comfortably     Frequency of care  Duration of care is estimated to be 6-8 weeks, from the initial treatment.  It is estimated that the patient will need a total of 6-8 visits to resolve this episode.  For the initial therapeutic trial of care, the frequency is recommended at 1 X week, once daily.  A reevaluation would be clinically appropriate in 6 visits, to determine progress and further course of care.    In-Office Treatment  Evaluation  Spinal Chiropractic Manipulative Therapy  R/C stretching and exercises         Recommendations:    Instructions:expect soreness     Follow-up:  Return to care in 1 week with US therapy.       Discussed the assessment with the patient.      Disclaimer: This note consists of symbols derived from keyboarding, dictation and/or voice recognition software. As a result, there may be errors in the script that have gone undetected. Please consider this when interpreting information found in this chart.

## 2020-12-26 DIAGNOSIS — I10 HYPERTENSION GOAL BP (BLOOD PRESSURE) < 140/90: ICD-10-CM

## 2020-12-27 NOTE — TELEPHONE ENCOUNTER
Routing refill request to provider for review/approval because:  Patient needs to be seen because it has been more than 1 year since last office visit.

## 2021-01-03 ENCOUNTER — HEALTH MAINTENANCE LETTER (OUTPATIENT)
Age: 66
End: 2021-01-03

## 2021-01-05 RX ORDER — LOSARTAN POTASSIUM 25 MG/1
TABLET ORAL
Qty: 90 TABLET | Refills: 0 | Status: SHIPPED | OUTPATIENT
Start: 2021-01-05 | End: 2021-01-06

## 2021-01-05 NOTE — TELEPHONE ENCOUNTER
Needs to see me, probably in-person for an Annual female exam.  Chart reviewed.  Rx sent to pt's preferred pharmacy.       [facility] - [place], [place] - [address] Ken MD

## 2021-01-06 ENCOUNTER — OFFICE VISIT (OUTPATIENT)
Dept: FAMILY MEDICINE | Facility: CLINIC | Age: 66
End: 2021-01-06
Payer: COMMERCIAL

## 2021-01-06 ENCOUNTER — ANCILLARY PROCEDURE (OUTPATIENT)
Dept: GENERAL RADIOLOGY | Facility: CLINIC | Age: 66
End: 2021-01-06
Attending: FAMILY MEDICINE
Payer: COMMERCIAL

## 2021-01-06 VITALS
TEMPERATURE: 98.9 F | WEIGHT: 281 LBS | OXYGEN SATURATION: 96 % | BODY MASS INDEX: 45.16 KG/M2 | HEART RATE: 70 BPM | HEIGHT: 66 IN | SYSTOLIC BLOOD PRESSURE: 158 MMHG | DIASTOLIC BLOOD PRESSURE: 93 MMHG

## 2021-01-06 DIAGNOSIS — I10 HYPERTENSION GOAL BP (BLOOD PRESSURE) < 140/90: ICD-10-CM

## 2021-01-06 DIAGNOSIS — M25.552 HIP PAIN, LEFT: Primary | ICD-10-CM

## 2021-01-06 DIAGNOSIS — Z12.11 SCREEN FOR COLON CANCER: ICD-10-CM

## 2021-01-06 DIAGNOSIS — E66.01 MORBID OBESITY (H): ICD-10-CM

## 2021-01-06 DIAGNOSIS — G89.29 CHRONIC BILATERAL LOW BACK PAIN WITHOUT SCIATICA: ICD-10-CM

## 2021-01-06 DIAGNOSIS — M25.552 HIP PAIN, LEFT: ICD-10-CM

## 2021-01-06 DIAGNOSIS — M54.50 CHRONIC BILATERAL LOW BACK PAIN WITHOUT SCIATICA: ICD-10-CM

## 2021-01-06 DIAGNOSIS — I27.20 PULMONARY HYPERTENSION (H): ICD-10-CM

## 2021-01-06 DIAGNOSIS — Z23 NEED FOR PROPHYLACTIC VACCINATION AND INOCULATION AGAINST INFLUENZA: ICD-10-CM

## 2021-01-06 PROCEDURE — 90682 RIV4 VACC RECOMBINANT DNA IM: CPT | Performed by: FAMILY MEDICINE

## 2021-01-06 PROCEDURE — 80048 BASIC METABOLIC PNL TOTAL CA: CPT | Performed by: FAMILY MEDICINE

## 2021-01-06 PROCEDURE — 90471 IMMUNIZATION ADMIN: CPT | Performed by: FAMILY MEDICINE

## 2021-01-06 PROCEDURE — 73502 X-RAY EXAM HIP UNI 2-3 VIEWS: CPT | Mod: FY | Performed by: RADIOLOGY

## 2021-01-06 PROCEDURE — 99214 OFFICE O/P EST MOD 30 MIN: CPT | Mod: 25 | Performed by: FAMILY MEDICINE

## 2021-01-06 PROCEDURE — 36415 COLL VENOUS BLD VENIPUNCTURE: CPT | Performed by: FAMILY MEDICINE

## 2021-01-06 RX ORDER — ACETAMINOPHEN 500 MG
1000 TABLET ORAL EVERY 8 HOURS PRN
Status: ON HOLD | COMMUNITY
Start: 2021-01-06 | End: 2021-06-07

## 2021-01-06 RX ORDER — LOSARTAN POTASSIUM 25 MG/1
50 TABLET ORAL DAILY
Qty: 90 TABLET | Refills: 1 | Status: SHIPPED | OUTPATIENT
Start: 2021-01-06 | End: 2021-05-26

## 2021-01-06 ASSESSMENT — MIFFLIN-ST. JEOR: SCORE: 1846.36

## 2021-01-06 NOTE — PROGRESS NOTES
Assessment & Plan     Hip pain, left  Unclear etiology.  Having a fair amount of pain in lateral part of the hip. Greater trochanteric bursitis?  IT band syndrome?  I was fearful of osteoarthritis, but this is not a significant finding on her x-ray.  Despite findings of acetabular pincer configuration on x-ray, I am not particularly suspicious of femoral acetabular impingement syndrome as this finding is more common in younger patients.  We will treat with higher dose anti-inflammatories of 1000 mg of acetaminophen taken thrice daily.  Will refer to sports medicine for their opinion.  - XR Hip Left 2-3 Views; Future  - acetaminophen (TYLENOL) 500 MG tablet; Take 2 tablets (1,000 mg) by mouth every 8 hours as needed for mild pain    Pulmonary hypertension (H)  Pulmonary hypertension is likely secondary to underlying hypertension and obstructive sleep apnea.  I did not ask her about her use of CPAP today.  We will want to ensure that she continues to use this.  Blood pressure is under poor control today as noted below.    Morbid obesity (H)  Her weight is undoubtedly contributing to her obstructive sleep apnea, hypertension and potentially her hip pain.  Weight loss would be beneficial in treating all 3 of these conditions.  Would encourage Madyosn to lose weight with decreased calorie and carbohydrate intake.    Hypertension goal BP (blood pressure) < 140/90  Blood pressure is elevated today on recheck at 158/93.  I increased her losartan from 25 mg daily to 50 mg daily.  Would like to see her back in 3 weeks for recheck.  - Basic metabolic panel  (Ca, Cl, CO2, Creat, Gluc, K, Na, BUN)  - losartan (COZAAR) 25 MG tablet; Take 2 tablets (50 mg) by mouth daily    Screen for colon cancer  She is due for colon cancer screening and we have done FIT tests in the past for this.  - Fecal colorectal cancer screen (FIT); Future    Need for prophylactic vaccination and inoculation against influenza  Flu shot is given today.  -  "INFLUENZA QUAD, RECOMBINANT, P-FREE (RIV4) (FLUBLOCK) [17762]    Chronic bilateral low back pain without sciatica  She also notes rather significant pain in her low back that seems to have worsened.  She has known degenerative disc disease on MRI imaging and reports that a previous epidural steroid injection gave her minimal relief which was short lived.  She is interested in seeing someone about further treatment options for her low back pain.  Referral was made as noted below.  - Orthopedic & Spine  Referral; Future                  {Provider  Link to [facility] Help Grid :[id number]}     BMI:   Estimated body mass index is 45.35 kg/m  as calculated from the following:    Height as of this encounter: 1.676 m (5' 6\").    Weight as of this encounter: 127.5 kg (281 lb).   Weight management plan: Discussed healthy diet and exercise guidelines      See Patient Instructions    Return in about 3 weeks (around 1/27/2021) for recheck BP, left hip and low back pain.    Ga Ken Jr, MD  [facility]     Madyson Murray is a 65 year old who presents to clinic today for the following health issues     HPI       Hip Pain    Onset: 5 year(s) ago     Description:   Left hip with low back pain   Character: a change in ambulation, dull, achy pain deep within the hip joint, radiation of the pain into the back, sharp, shooting pain in hip and waking at night with hip pain        Does it hurt to bear weight: YES        Does it cause you to limp: YES    Intensity:moderate    History:  Any injury to hip: no  Any history of back problems: YES  Previous imaging studies: no    Accompanying Signs & Symptoms:   Fever: no                 Any weakness of leg: no                 Numbness or tingling of leg: no                 Is the pain worse with movement of the back or hip: YES  Any swelling or tenderness: no                 Does activity worsen symptoms: YES- worse after standing                  " "Does rest help: YES- ok when sitting but hurts to stand up after sitting to long                  Any other ongoing joint pain: YES    Therapies tried and outcome: chiropractic treatment and ibuprofen with minor relief    Hx of chronic low back pain - worse on Lt side but is moving up the back     Pulmonary HTN   Has history of pulmonary hypertension that was seen on echocardiogram in 2018.  Sleep study revealed obstructive sleep apnea thought to be contributing.  She also has poorly controlled HTN that is contributing. She is presently on an ARB for treatment.   She admits she has a difficult time remembering to take her medications at times.    HTN - is on a small amount of losartan.  Denies side effects. Struggles at times to take her medication regularly.          Review of Systems   Constitutional, HEENT, cardiovascular, pulmonary, gi and gu systems are negative, except as otherwise noted.      Objective    BP (!) 158/93   Pulse 70   Temp 98.9  F (37.2  C) (Tympanic)   Ht 1.676 m (5' 6\")   Wt 127.5 kg (281 lb)   SpO2 96%   BMI 45.35 kg/m    Body mass index is 45.35 kg/m .  Physical Exam   GENERAL APPEARANCE: healthy, alert and no distress. Obese.  Antalgic gait.  Scoliotic deformity upon inspection with standing.  HENT: ear canals and TM's normal and nose and mouth without ulcers or lesions  NECK: no adenopathy, no asymmetry, masses, or scars and thyroid normal to palpation  RESP: lungs clear to auscultation - no rales, rhonchi or wheezes  CV: regular rates and rhythm, normal S1 S2, no S3 or S4 and no murmur, click or rub  ORTHO: Hip Exam: Palpation: Tender:   left greater trochanter  Non-tender:  left ASIS, left iliac crest  Range of Motion:  Left Hip  flexion   full, pain-free, extension  full, pain-free, external rotation  full, pain-free, internal rotation  full, pain-free, adduction  Not tested, abduction  full, pain-free  Strength:  Not tested  Special tests:  CARLOS negative      Xray - Reviewed and " interpreted by me.  I did not appreciate the acetabular pincer-type configuration.  Results for orders placed or performed in visit on 01/06/21   XR Hip Left 2-3 Views     Status: None    Narrative    HIP LEFT TWO TO THREE VIEWS   1/6/2021 12:27 PM     HISTORY:  Hip pain, left      Impression    IMPRESSION: There is an acetabular pincer-type configuration. This can  predispose to femoroacetabular impingement, and clinical correlation  is recommended. Otherwise unremarkable. No fracture identified.    PAN BSOCH MD   Results for orders placed or performed in visit on 01/06/21   Basic metabolic panel  (Ca, Cl, CO2, Creat, Gluc, K, Na, BUN)     Status: None   Result Value Ref Range    Sodium 139 133 - 144 mmol/L    Potassium 4.3 3.4 - 5.3 mmol/L    Chloride 107 94 - 109 mmol/L    Carbon Dioxide 28 20 - 32 mmol/L    Anion Gap 4 3 - 14 mmol/L    Glucose 91 70 - 99 mg/dL    Urea Nitrogen 13 7 - 30 mg/dL    Creatinine 0.74 0.52 - 1.04 mg/dL    GFR Estimate 86 >60 mL/min/[1.73_m2]    GFR Estimate If Black >90 >60 mL/min/[1.73_m2]    Calcium 9.1 8.5 - 10.1 mg/dL

## 2021-01-07 PROBLEM — I27.20 PULMONARY HYPERTENSION (H): Status: ACTIVE | Noted: 2018-03-14

## 2021-01-07 LAB
ANION GAP SERPL CALCULATED.3IONS-SCNC: 4 MMOL/L (ref 3–14)
BUN SERPL-MCNC: 13 MG/DL (ref 7–30)
CALCIUM SERPL-MCNC: 9.1 MG/DL (ref 8.5–10.1)
CHLORIDE SERPL-SCNC: 107 MMOL/L (ref 94–109)
CO2 SERPL-SCNC: 28 MMOL/L (ref 20–32)
CREAT SERPL-MCNC: 0.74 MG/DL (ref 0.52–1.04)
GFR SERPL CREATININE-BSD FRML MDRD: 86 ML/MIN/{1.73_M2}
GLUCOSE SERPL-MCNC: 91 MG/DL (ref 70–99)
POTASSIUM SERPL-SCNC: 4.3 MMOL/L (ref 3.4–5.3)
SODIUM SERPL-SCNC: 139 MMOL/L (ref 133–144)

## 2021-01-07 NOTE — TELEPHONE ENCOUNTER
DIAGNOSIS: Chronic Bilateral LBP without sciatica, Referred by Dr. Ga Ken from [facility]   APPOINTMENT DATE: 1/12/2021   NOTES STATUS DETAILS   OFFICE NOTE from referring provider Internal [facility]:  1/6/21 - PCC OV with Dr. Ken   OFFICE NOTE from other specialist Care Everywhere / Internal [facility]:  6/1/20 - CHIRO OV with Olya Romero DC  10/30/19 - PAIN OV with Dr. Gurrola  10/18/19 to 10/25/19 - PT OVs with Brittney Hayward, PT    [facility]  9/18/19 - PCC OV with Dr. Jesus Manuel Hernadez  [facility]:  7/7/09, 6/9/09 - PT OVs with MELY [facility]:  6/5/09 - PCC OV with Dr. Gibson  10/24/06 - PCC OV with Dr. Cardenas   DISCHARGE SUMMARY from hospital N/A    DISCHARGE REPORT from the ER N/A    OPERATIVE REPORT N/A    EMG report N/A    MEDICATION LIST Internal    MRI Internal [facility]:  10/16/19 - MRI Lumbar Spine WO  10/16/19 - MRI Thoracic Spine WO   DEXA (osteoporosis/bone health) N/A    CT SCAN N/A    XRAYS (IMAGES & REPORTS) Internal [facility]:  1/6/21 - XR Hip Left  9/18/19 - XR Thoracic Spine  9/18/19 - XR Lumbar Spine

## 2021-01-08 NOTE — RESULT ENCOUNTER NOTE
Ms. Murray,    -Kidney function is normal (Cr, GFR), Sodium is normal, Potassium is normal, Calcium is normal, Glucose is normal.     If you have further questions about the interpretation of your labs, [email or web address] is a good website to check out for further information.    Please contact the clinic if you have additional questions.  Thank you.    Sincerely,    Ga Ken MD

## 2021-01-12 ENCOUNTER — OFFICE VISIT (OUTPATIENT)
Dept: ORTHOPEDICS | Facility: CLINIC | Age: 66
End: 2021-01-12
Payer: COMMERCIAL

## 2021-01-12 ENCOUNTER — PRE VISIT (OUTPATIENT)
Dept: ORTHOPEDICS | Facility: CLINIC | Age: 66
End: 2021-01-12

## 2021-01-12 VITALS — WEIGHT: 281 LBS | HEIGHT: 66 IN | BODY MASS INDEX: 45.16 KG/M2

## 2021-01-12 DIAGNOSIS — E66.01 MORBID OBESITY (H): ICD-10-CM

## 2021-01-12 DIAGNOSIS — M54.50 CHRONIC BILATERAL LOW BACK PAIN WITHOUT SCIATICA: ICD-10-CM

## 2021-01-12 DIAGNOSIS — G89.29 CHRONIC BILATERAL LOW BACK PAIN WITHOUT SCIATICA: ICD-10-CM

## 2021-01-12 DIAGNOSIS — M51.369 DEGENERATIVE DISC DISEASE, LUMBAR: Primary | ICD-10-CM

## 2021-01-12 PROCEDURE — 99214 OFFICE O/P EST MOD 30 MIN: CPT | Performed by: FAMILY MEDICINE

## 2021-01-12 RX ORDER — MELOXICAM 15 MG/1
15 TABLET ORAL DAILY
Qty: 30 TABLET | Refills: 1 | Status: SHIPPED | OUTPATIENT
Start: 2021-01-12 | End: 2021-03-30

## 2021-01-12 ASSESSMENT — MIFFLIN-ST. JEOR: SCORE: 1846.36

## 2021-01-12 NOTE — Clinical Note
Thanks for the referral. Unfortunately she thought I was a spine surgeon and was a bit frustrated.  She my note for details. Happy to see her back but my sense is she will proceed with surgery for her back. Diya Fisher

## 2021-01-12 NOTE — TELEPHONE ENCOUNTER
RECORDS RECEIVED FROM: Degenerative disc disease, lumbar [M51.36]  Chronic bilateral low back pain without sciatica   DATE RECEIVED: Jan 19, 2021     NOTES STATUS DETAILS   OFFICE NOTE from referring provider Internal    OFFICE NOTE from other specialist N/A    DISCHARGE SUMMARY from hospital N/A    DISCHARGE REPORT from the ER N/A    OPERATIVE REPORT N/A    MEDICATION LIST Internal    IMPLANT RECORD/STICKER N/A    LABS     CBC/DIFF N/A    CULTURES N/A    INJECTIONS DONE IN RADIOLOGY N/A    MRI Internal    CT SCAN N/A    XRAYS (IMAGES & REPORTS) Internal    TUMOR     PATHOLOGY  Slides & report N/A    01/12/21   10:04 AM   Complete  Erica Freeman CMA

## 2021-01-12 NOTE — LETTER
1/12/2021      RE: Madyson Murray  [id number]  [facility] [address]       Pt is a 65 year old female referred by Dr Ken here today for:     Low back pain:   Location: Bilateral low back. Predominately left sided. Hip seems to have gotten better -> she would like to discuss her back pain   Duration: 5 years   Radiation: No  Numbness/ Tingling? No  Weakness? No  Flexion or Extension bias: No bias.   Red flags? Some incontinence   Meds tried? BRANDON 10/30/2019- lasted one month   PT? Yes in 2019- temporary relief; 6-8 weeks of PT  Imaging?   1) Hip xray 1/6/21  IMPRESSION: There is an acetabular pincer-type configuration. This can  predispose to femoroacetabular impingement, and clinical correlation  is recommended. Otherwise unremarkable. No fracture identified.    2) MRI lumbar spine - 10/2019  FINDINGS: Radiographs show five lumbar-type vertebral bodies with  scoliosis convex to the left.  The distal spinal cord and cauda equina  appear normal.      T12-L1:  Mild degenerative disc disease, otherwise normal.   L1-L2:  Severe degenerative disc disease, minimal impression on the  thecal sac, otherwise normal.   L2-L3:  Severe degenerative disc disease, minimal impression on the  thecal sac, mild bilateral foraminal stenosis, otherwise normal.   L3-L4:  Moderate degenerative disc disease, worse laterally on the  right. Mild right foraminal stenosis. Bilateral moderate degenerative  facet arthropathy.   L4-L5:  Grade 1 spondylolisthesis, mild degenerative disc disease,  mild spinal canal stenosis, moderate degenerative facet arthropathy  bilaterally, mild bilateral foraminal stenosis.   L5-S1:  Mild degenerative disc disease, worse on left. Bilateral  moderate degenerative facet arthropathy. Normal spinal canal. Severe  left foraminal stenosis and mild right foraminal stenosis.   Paraspinous soft tissues:   Normal.     Bone marrow:   Normal.                                                                      IMPRESSION:  Scoliosis and multilevel degenerative disc disease and  degenerative facet arthropathy as described above.    From Dr Ken note on 1/6/2021:  Hip pain, left  Unclear etiology.  Having a fair amount of pain in lateral part of the hip. Greater trochanteric bursitis?  IT band syndrome?  I was fearful of osteoarthritis, but this is not a significant finding on her x-ray.  Despite findings of acetabular pincer configuration on x-ray, I am not particularly suspicious of femoral acetabular impingement syndrome as this finding is more common in younger patients.  We will treat with higher dose anti-inflammatories of 1000 mg of acetaminophen taken thrice daily.  Will refer to sports medicine for their opinion.  - XR Hip Left 2-3 Views; Future  - acetaminophen (TYLENOL) 500 MG tablet; Take 2 tablets (1,000 mg) by mouth every 8 hours as needed for mild pain    Chronic bilateral low back pain without sciatica  She also notes rather significant pain in her low back that seems to have worsened.  She has known degenerative disc disease on MRI imaging and reports that a previous epidural steroid injection gave her minimal relief which was short lived.  She is interested in seeing someone about further treatment options for her low back pain.  Referral was made as noted below.  - Orthopedic & Spine  Referral; Future    Obesity - hard to exercise due to pain; has lost 12 lbs since 12/2019  Eats less sugar and has decreased her portion size     Wt Readings from Last 3 Encounters:   01/12/21 127.5 kg (281 lb)   01/06/21 127.5 kg (281 lb)   12/12/19 133.8 kg (295 lb)       Past Medical History:   Diagnosis Date     Pulmonary hypertension (H)      Tricuspid regurgitation       Past Surgical History:   Procedure Laterality Date     ORTHOPEDIC SURGERY      2010 and 2012      Patient Active Problem List   Diagnosis Code     Morbid obesity (H) E66.01     CARDIOVASCULAR SCREENING; LDL GOAL LESS THAN 130 Z13.6      "Pulmonary hypertension (H) I27.20     Non-rheumatic tricuspid valve insufficiency I36.1     Obstructive sleep apnea syndrome G47.33     Venous (peripheral) insufficiency I87.2     Chronic bilateral low back pain without sciatica M54.5, G89.29     Somatic dysfunction of lumbar region M99.03     Somatic dysfunction of sacral region M99.04     Sacral pain M53.3     Thoracic segment dysfunction M99.02     Right shoulder pain, unspecified chronicity M25.511       Current Outpatient Medications   Medication Sig Dispense Refill     acetaminophen (TYLENOL) 500 MG tablet Take 2 tablets (1,000 mg) by mouth every 8 hours as needed for mild pain       losartan (COZAAR) 25 MG tablet Take 2 tablets (50 mg) by mouth daily 90 tablet 1      Allergies   Allergen Reactions     Liquid Adhesive       Social History     Tobacco Use     Smoking status: Former Smoker     Packs/day: 1.00     Years: 15.00     Pack years: 15.00     Smokeless tobacco: Never Used     Tobacco comment: quit around age 32   Substance Use Topics     Alcohol use: Yes     Comment: 2-4 drinks couple times per week     Drug use: No      Family History   Problem Relation Age of Onset     Hypertension Mother      Diabetes Father      Hypertension Father      Parkinsonism Father      Diabetes Sister      Hypertension Sister      Sleep Apnea Sister      Sleep Apnea Brother       ROS:   Gen- no fevers/chills   Rheum - no morning stiffness   Derm - no rash/ redness   Neuro - no numbness, no tingling   Remainder of ROS negative.     Exam:   Ht 1.676 m (5' 6\")   Wt 127.5 kg (281 lb)   BMI 45.35 kg/m         BACK:   ROM: flexion - 80 /extension -10 /lateral rotation- full /side bend- full   Bony tenderness: midline L1-L5  Paraspinal tenderness: L-sided  Sensation: intact in b/l lower extremities.   Strength: 5/5 w/ dorsiflexion/ plantarflexion/ inversion/ eversion/ knee flexion/ extension.   Maneuvers: Neg straight leg raise b/l. Neg Slump b/l Neg CARLOS   Neuro: DTR's 2+. " "Babinski's downgoing. Neg Clonus     Gait - antalgic w/ significant pelvic obliquity and valgus alignment of R knee       (M51.36) Degenerative disc disease, lumbar  (primary encounter diagnosis)  Comment: reviewed her exam and previous imaging findings at length; reviewed treatment options including meds, PT, injections, surgery; she has failed PT and injection; she is interested in pursuing surgery. She was tearful and frustrated because she thought today's appointment was for a surgical consult for her back.  I explained that today's appointment was specifically a sports medicine referral requested by her PCP to address her hip pain but I focused on her back at her request.  \"Why don't I walk straight? I waddle when I walk\"; I examined her gait and briefly examined her hip which was not her pain generator.  I do agree that her pain is primarily from her low back. That being said, I explained that she has multilevel severe degenerative disc disease so the surgery she would be discussing would likely be a multilevel fusion.  She would like to proceed with that plan.  I have ordered a repeat MRI to update her scan and placed a spine surgery referral. Meds adjusted. Anticipatory guidance given; She will f/u with me prn for her hip pain.  Plan: MRI Lumbar spine w/o contrast, Orthopedic &         Spine  Referral, meloxicam (MOBIC) 15 MG tablet          (M54.5,  G89.29) Chronic bilateral low back pain without sciatica  Comment: see above   Plan: MRI Lumbar spine w/o contrast, Orthopedic &         Spine  Referral, meloxicam (MOBIC) 15 MG tablet          (E66.01) Morbid obesity (H)  Comment:   Plan: I stressed the importance of continued lifestyle modification, especially if she ultimately pursues surgery         Phillip Parikh MD  January 12, 2021  8:29 AM      "

## 2021-01-12 NOTE — PROGRESS NOTES
Pt is a 65 year old female referred by Dr Ken here today for:     Low back pain:   Location: Bilateral low back. Predominately left sided. Hip seems to have gotten better -> she would like to discuss her back pain   Duration: 5 years   Radiation: No  Numbness/ Tingling? No  Weakness? No  Flexion or Extension bias: No bias.   Red flags? Some incontinence   Meds tried? BRANDON 10/30/2019- lasted one month   PT? Yes in 2019- temporary relief; 6-8 weeks of PT  Imaging?   1) Hip xray 1/6/21  IMPRESSION: There is an acetabular pincer-type configuration. This can  predispose to femoroacetabular impingement, and clinical correlation  is recommended. Otherwise unremarkable. No fracture identified.    2) MRI lumbar spine - 10/2019  FINDINGS: Radiographs show five lumbar-type vertebral bodies with  scoliosis convex to the left.  The distal spinal cord and cauda equina  appear normal.      T12-L1:  Mild degenerative disc disease, otherwise normal.   L1-L2:  Severe degenerative disc disease, minimal impression on the  thecal sac, otherwise normal.   L2-L3:  Severe degenerative disc disease, minimal impression on the  thecal sac, mild bilateral foraminal stenosis, otherwise normal.   L3-L4:  Moderate degenerative disc disease, worse laterally on the  right. Mild right foraminal stenosis. Bilateral moderate degenerative  facet arthropathy.   L4-L5:  Grade 1 spondylolisthesis, mild degenerative disc disease,  mild spinal canal stenosis, moderate degenerative facet arthropathy  bilaterally, mild bilateral foraminal stenosis.   L5-S1:  Mild degenerative disc disease, worse on left. Bilateral  moderate degenerative facet arthropathy. Normal spinal canal. Severe  left foraminal stenosis and mild right foraminal stenosis.   Paraspinous soft tissues:   Normal.     Bone marrow:   Normal.                                                                     IMPRESSION:  Scoliosis and multilevel degenerative disc disease and  degenerative  facet arthropathy as described above.    From Dr Ken note on 1/6/2021:  Hip pain, left  Unclear etiology.  Having a fair amount of pain in lateral part of the hip. Greater trochanteric bursitis?  IT band syndrome?  I was fearful of osteoarthritis, but this is not a significant finding on her x-ray.  Despite findings of acetabular pincer configuration on x-ray, I am not particularly suspicious of femoral acetabular impingement syndrome as this finding is more common in younger patients.  We will treat with higher dose anti-inflammatories of 1000 mg of acetaminophen taken thrice daily.  Will refer to sports medicine for their opinion.  - XR Hip Left 2-3 Views; Future  - acetaminophen (TYLENOL) 500 MG tablet; Take 2 tablets (1,000 mg) by mouth every 8 hours as needed for mild pain    Chronic bilateral low back pain without sciatica  She also notes rather significant pain in her low back that seems to have worsened.  She has known degenerative disc disease on MRI imaging and reports that a previous epidural steroid injection gave her minimal relief which was short lived.  She is interested in seeing someone about further treatment options for her low back pain.  Referral was made as noted below.  - Orthopedic & Spine  Referral; Future    Obesity - hard to exercise due to pain; has lost 12 lbs since 12/2019  Eats less sugar and has decreased her portion size     Wt Readings from Last 3 Encounters:   01/12/21 127.5 kg (281 lb)   01/06/21 127.5 kg (281 lb)   12/12/19 133.8 kg (295 lb)       Past Medical History:   Diagnosis Date     Pulmonary hypertension (H)      Tricuspid regurgitation       Past Surgical History:   Procedure Laterality Date     ORTHOPEDIC SURGERY      2010 and 2012      Patient Active Problem List   Diagnosis Code     Morbid obesity (H) E66.01     CARDIOVASCULAR SCREENING; LDL GOAL LESS THAN 130 Z13.6     Pulmonary hypertension (H) I27.20     Non-rheumatic tricuspid valve insufficiency  "I36.1     Obstructive sleep apnea syndrome G47.33     Venous (peripheral) insufficiency I87.2     Chronic bilateral low back pain without sciatica M54.5, G89.29     Somatic dysfunction of lumbar region M99.03     Somatic dysfunction of sacral region M99.04     Sacral pain M53.3     Thoracic segment dysfunction M99.02     Right shoulder pain, unspecified chronicity M25.511       Current Outpatient Medications   Medication Sig Dispense Refill     acetaminophen (TYLENOL) 500 MG tablet Take 2 tablets (1,000 mg) by mouth every 8 hours as needed for mild pain       losartan (COZAAR) 25 MG tablet Take 2 tablets (50 mg) by mouth daily 90 tablet 1      Allergies   Allergen Reactions     Liquid Adhesive       Social History     Tobacco Use     Smoking status: Former Smoker     Packs/day: 1.00     Years: 15.00     Pack years: 15.00     Smokeless tobacco: Never Used     Tobacco comment: quit around age 32   Substance Use Topics     Alcohol use: Yes     Comment: 2-4 drinks couple times per week     Drug use: No      Family History   Problem Relation Age of Onset     Hypertension Mother      Diabetes Father      Hypertension Father      Parkinsonism Father      Diabetes Sister      Hypertension Sister      Sleep Apnea Sister      Sleep Apnea Brother       ROS:   Gen- no fevers/chills   Rheum - no morning stiffness   Derm - no rash/ redness   Neuro - no numbness, no tingling   Remainder of ROS negative.     Exam:   Ht 1.676 m (5' 6\")   Wt 127.5 kg (281 lb)   BMI 45.35 kg/m         BACK:   ROM: flexion - 80 /extension -10 /lateral rotation- full /side bend- full   Bony tenderness: midline L1-L5  Paraspinal tenderness: L-sided  Sensation: intact in b/l lower extremities.   Strength: 5/5 w/ dorsiflexion/ plantarflexion/ inversion/ eversion/ knee flexion/ extension.   Maneuvers: Neg straight leg raise b/l. Neg Slump b/l Neg CARLOS   Neuro: DTR's 2+. Babinski's downgoing. Neg Clonus     Gait - antalgic w/ significant pelvic obliquity " "and valgus alignment of R knee       (M51.36) Degenerative disc disease, lumbar  (primary encounter diagnosis)  Comment: reviewed her exam and previous imaging findings at length; reviewed treatment options including meds, PT, injections, surgery; she has failed PT and injection; she is interested in pursuing surgery. She was tearful and frustrated because she thought today's appointment was for a surgical consult for her back.  I explained that today's appointment was specifically a sports medicine referral requested by her PCP to address her hip pain but I focused on her back at her request.  \"Why don't I walk straight? I waddle when I walk\"; I examined her gait and briefly examined her hip which was not her pain generator.  I do agree that her pain is primarily from her low back. That being said, I explained that she has multilevel severe degenerative disc disease so the surgery she would be discussing would likely be a multilevel fusion.  She would like to proceed with that plan.  I have ordered a repeat MRI to update her scan and placed a spine surgery referral. Meds adjusted. Anticipatory guidance given; She will f/u with me prn for her hip pain.  Plan: MRI Lumbar spine w/o contrast, Orthopedic &         Spine  Referral, meloxicam (MOBIC) 15 MG tablet          (M54.5,  G89.29) Chronic bilateral low back pain without sciatica  Comment: see above   Plan: MRI Lumbar spine w/o contrast, Orthopedic &         Spine  Referral, meloxicam (MOBIC) 15 MG tablet          (E66.01) Morbid obesity (H)  Comment:   Plan: I stressed the importance of continued lifestyle modification, especially if she ultimately pursues surgery         Phillip Parikh MD  January 12, 2021  8:29 AM    "

## 2021-01-14 ENCOUNTER — ANCILLARY PROCEDURE (OUTPATIENT)
Dept: MRI IMAGING | Facility: CLINIC | Age: 66
End: 2021-01-14
Attending: FAMILY MEDICINE
Payer: COMMERCIAL

## 2021-01-14 DIAGNOSIS — M51.369 DEGENERATIVE DISC DISEASE, LUMBAR: ICD-10-CM

## 2021-01-14 DIAGNOSIS — M54.50 CHRONIC BILATERAL LOW BACK PAIN WITHOUT SCIATICA: ICD-10-CM

## 2021-01-14 DIAGNOSIS — G89.29 CHRONIC BILATERAL LOW BACK PAIN WITHOUT SCIATICA: ICD-10-CM

## 2021-01-14 DIAGNOSIS — M54.50 CHRONIC BILATERAL LOW BACK PAIN WITHOUT SCIATICA: Primary | ICD-10-CM

## 2021-01-14 DIAGNOSIS — G89.29 CHRONIC BILATERAL LOW BACK PAIN WITHOUT SCIATICA: Primary | ICD-10-CM

## 2021-01-14 PROCEDURE — 72148 MRI LUMBAR SPINE W/O DYE: CPT | Performed by: RADIOLOGY

## 2021-01-18 ENCOUNTER — ANCILLARY PROCEDURE (OUTPATIENT)
Dept: GENERAL RADIOLOGY | Facility: CLINIC | Age: 66
End: 2021-01-18
Attending: ORTHOPAEDIC SURGERY
Payer: COMMERCIAL

## 2021-01-18 PROCEDURE — 72110 X-RAY EXAM L-2 SPINE 4/>VWS: CPT | Performed by: RADIOLOGY

## 2021-01-19 ENCOUNTER — PRE VISIT (OUTPATIENT)
Dept: ORTHOPEDICS | Facility: CLINIC | Age: 66
End: 2021-01-19

## 2021-01-19 ENCOUNTER — VIRTUAL VISIT (OUTPATIENT)
Dept: ORTHOPEDICS | Facility: CLINIC | Age: 66
End: 2021-01-19
Attending: FAMILY MEDICINE
Payer: COMMERCIAL

## 2021-01-19 DIAGNOSIS — G89.29 CHRONIC BILATERAL LOW BACK PAIN WITHOUT SCIATICA: ICD-10-CM

## 2021-01-19 DIAGNOSIS — M51.369 DEGENERATIVE DISC DISEASE, LUMBAR: ICD-10-CM

## 2021-01-19 DIAGNOSIS — M54.50 CHRONIC BILATERAL LOW BACK PAIN WITHOUT SCIATICA: ICD-10-CM

## 2021-01-19 DIAGNOSIS — Z13.820 SCREENING FOR OSTEOPOROSIS: Primary | ICD-10-CM

## 2021-01-19 PROCEDURE — 99203 OFFICE O/P NEW LOW 30 MIN: CPT | Mod: 95 | Performed by: ORTHOPAEDIC SURGERY

## 2021-01-19 NOTE — NURSING NOTE
Reason For Visit:   Chief Complaint   Patient presents with     RECHECK     lower left sided back pain for about 5 years, patient stated she is having no radiating symptoms down her legs. Has tried PT and injection that lasted about 1 month, no previous surgeries on her spine        There were no vitals taken for this visit.         Marcos Burnham, ATC

## 2021-01-19 NOTE — PROGRESS NOTES
Madyson is a 65 year old who is being evaluated via a billable video visit.      How would you like to obtain your AVS? MyChart  If the video visit is dropped, the invitation should be resent by: Text to cell phone: [phone]  Will anyone else be joining your video visit? No      I called and spoke to Madyson today.  She is dealing with severe low back pain which severely limits her ability to mobilize.  No radicular complaints.  She is been dealing with it for about 5 years.  She did physical therapy in 2017, 2018 and 2019 and 2019 she also had an epidural injection which was of minimal benefit.  She did not do therapy in 2020 because of the coronavirus pandemic.    I reviewed her lumbar radiographs which show severe degenerative scoliosis, measuring approximately 30 degrees, apex to the left side.  She also has relative lumbar flat back and diffuse spondylosis.  Her MRI shows severe spondylosis.    We discussed that fixing this would likely require a multilevel fusion operation.  She is potentially interested in surgery.  I briefly went over the risks and benefits today.    I explained that to see if she is a candidate for surgery would need to do additional planning.  Most likely this would be a 5 to 6-hour surgery, with 500 to 1 L of blood loss and be a multilevel interbody fusion type operation.  I would like her to see our anesthesia clinic for medical screening.  She will need to have a CT scan of the thoracic and lumbar spine on the same sagittal reconstruction as well as standing scoliosis radiographs and a bone density scan.  I will see her in clinic when the screening tests have been completed and we will discuss further with her in person.    Video-Visit Details    Type of service:  Video Visit    Video Time:     Originating Location (pt. Location): Home    Distant Location (provider location):  [facility]     Platform used for Video Visit: Box Jump

## 2021-01-19 NOTE — LETTER
1/19/2021         RE: Madyson Murray  [id number]  [place] [address]        Dear Colleague,    Thank you for referring your patient, Madyson Murray, to the [facility] [place]. Please see a copy of my visit note below.    Madyson is a 65 year old who is being evaluated via a billable video visit.      How would you like to obtain your AVS? MyChart  If the video visit is dropped, the invitation should be resent by: Text to cell phone: [phone]  Will anyone else be joining your video visit? No      I called and spoke to Madyson today.  She is dealing with severe low back pain which severely limits her ability to mobilize.  No radicular complaints.  She is been dealing with it for about 5 years.  She did physical therapy in 2017, 2018 and 2019 and 2019 she also had an epidural injection which was of minimal benefit.  She did not do therapy in 2020 because of the coronavirus pandemic.    I reviewed her lumbar radiographs which show severe degenerative scoliosis, measuring approximately 30 degrees, apex to the left side.  She also has relative lumbar flat back and diffuse spondylosis.  Her MRI shows severe spondylosis.    We discussed that fixing this would likely require a multilevel fusion operation.  She is potentially interested in surgery.  I briefly went over the risks and benefits today.    I explained that to see if she is a candidate for surgery would need to do additional planning.  Most likely this would be a 5 to 6-hour surgery, with 500 to 1 L of blood loss and be a multilevel interbody fusion type operation.  I would like her to see our anesthesia clinic for medical screening.  She will need to have a CT scan of the thoracic and lumbar spine on the same sagittal reconstruction as well as standing scoliosis radiographs and a bone density scan.  I will see her in clinic when the screening tests have been completed and we will discuss further with her in  person.    Video-Visit Details    Type of service:  Video Visit    Video Time:     Originating Location (pt. Location): Home    Distant Location (provider location):  [facility]     Platform used for Video Visit: Destiny León MD

## 2021-01-28 ENCOUNTER — ANCILLARY PROCEDURE (OUTPATIENT)
Dept: CT IMAGING | Facility: CLINIC | Age: 66
End: 2021-01-28
Attending: ORTHOPAEDIC SURGERY
Payer: COMMERCIAL

## 2021-01-28 ENCOUNTER — ANCILLARY PROCEDURE (OUTPATIENT)
Dept: BONE DENSITY | Facility: CLINIC | Age: 66
End: 2021-01-28
Attending: ORTHOPAEDIC SURGERY
Payer: COMMERCIAL

## 2021-01-28 ENCOUNTER — ANCILLARY PROCEDURE (OUTPATIENT)
Dept: GENERAL RADIOLOGY | Facility: CLINIC | Age: 66
End: 2021-01-28
Attending: ORTHOPAEDIC SURGERY
Payer: COMMERCIAL

## 2021-01-28 DIAGNOSIS — G89.29 CHRONIC BILATERAL LOW BACK PAIN WITHOUT SCIATICA: ICD-10-CM

## 2021-01-28 DIAGNOSIS — M54.50 CHRONIC BILATERAL LOW BACK PAIN WITHOUT SCIATICA: ICD-10-CM

## 2021-01-28 DIAGNOSIS — Z13.820 SCREENING FOR OSTEOPOROSIS: ICD-10-CM

## 2021-01-28 PROCEDURE — 72082 X-RAY EXAM ENTIRE SPI 2/3 VW: CPT | Performed by: STUDENT IN AN ORGANIZED HEALTH CARE EDUCATION/TRAINING PROGRAM

## 2021-01-28 PROCEDURE — 77080 DXA BONE DENSITY AXIAL: CPT | Performed by: INTERNAL MEDICINE

## 2021-01-28 PROCEDURE — 72131 CT LUMBAR SPINE W/O DYE: CPT | Mod: GC | Performed by: STUDENT IN AN ORGANIZED HEALTH CARE EDUCATION/TRAINING PROGRAM

## 2021-01-28 PROCEDURE — 72128 CT CHEST SPINE W/O DYE: CPT | Mod: GC | Performed by: STUDENT IN AN ORGANIZED HEALTH CARE EDUCATION/TRAINING PROGRAM

## 2021-01-28 NOTE — TELEPHONE ENCOUNTER
FUTURE VISIT INFORMATION      SURGERY INFORMATION:    Date:     Location:     Surgeon:      Anesthesia Type:      Procedure:     Consult:     *procedure not scheduled at time pre-visit was created    RECORDS REQUESTED FROM:       Primary Care Provider: Dr. Ken    Most recent EKG+ Tracing: 3.14.18    Most recent ECHO: 2.22.18

## 2021-01-29 ENCOUNTER — OFFICE VISIT (OUTPATIENT)
Dept: ORTHOPEDICS | Facility: CLINIC | Age: 66
End: 2021-01-29
Payer: COMMERCIAL

## 2021-01-29 ENCOUNTER — PRE VISIT (OUTPATIENT)
Dept: SURGERY | Facility: CLINIC | Age: 66
End: 2021-01-29

## 2021-01-29 ENCOUNTER — TELEPHONE (OUTPATIENT)
Dept: ORTHOPEDICS | Facility: CLINIC | Age: 66
End: 2021-01-29

## 2021-01-29 ENCOUNTER — ANESTHESIA EVENT (OUTPATIENT)
Dept: SURGERY | Facility: CLINIC | Age: 66
End: 2021-01-29

## 2021-01-29 ENCOUNTER — OFFICE VISIT (OUTPATIENT)
Dept: SURGERY | Facility: CLINIC | Age: 66
End: 2021-01-29
Payer: COMMERCIAL

## 2021-01-29 VITALS
RESPIRATION RATE: 16 BRPM | HEART RATE: 91 BPM | OXYGEN SATURATION: 92 % | DIASTOLIC BLOOD PRESSURE: 77 MMHG | BODY MASS INDEX: 45.96 KG/M2 | WEIGHT: 286 LBS | SYSTOLIC BLOOD PRESSURE: 141 MMHG | TEMPERATURE: 98 F | HEIGHT: 66 IN

## 2021-01-29 DIAGNOSIS — M54.50 CHRONIC BILATERAL LOW BACK PAIN WITHOUT SCIATICA: Primary | ICD-10-CM

## 2021-01-29 DIAGNOSIS — M41.50 DEGENERATIVE SCOLIOSIS IN ADULT PATIENT: Primary | ICD-10-CM

## 2021-01-29 DIAGNOSIS — G89.29 CHRONIC BILATERAL LOW BACK PAIN WITHOUT SCIATICA: Primary | ICD-10-CM

## 2021-01-29 DIAGNOSIS — I27.20 PULMONARY HTN (H): ICD-10-CM

## 2021-01-29 PROCEDURE — 99203 OFFICE O/P NEW LOW 30 MIN: CPT | Performed by: CLINICAL NURSE SPECIALIST

## 2021-01-29 PROCEDURE — 99213 OFFICE O/P EST LOW 20 MIN: CPT | Performed by: ORTHOPAEDIC SURGERY

## 2021-01-29 ASSESSMENT — LIFESTYLE VARIABLES: TOBACCO_USE: 1

## 2021-01-29 ASSESSMENT — PAIN SCALES - GENERAL: PAINLEVEL: SEVERE PAIN (7)

## 2021-01-29 ASSESSMENT — MIFFLIN-ST. JEOR: SCORE: 1869.04

## 2021-01-29 NOTE — NURSING NOTE
Reason For Visit:   Chief Complaint   Patient presents with     RECHECK     PAC review and discuss surgery        There were no vitals taken for this visit.         Marcos Burnham ATC

## 2021-01-29 NOTE — PROGRESS NOTES
Spine Surgery Return Clinic Visit      Chief Complaint:   RECHECK (PAC review and discuss surgery )      Interval HPI:  Symptom Profile Including: location of symptoms, onset, severity, exacerbating/alleviating factors, previous treatments:        Madyson Murray is a 65 year old female who I met with in person today for the first time to discuss her degenerative scoliosis.  We had a virtual visit on January 19 and at that time I ordered some planning studies to try and see if she would be a candidate for surgery.  Her history is unchanged from that visit.  She did see our anesthesia clinic today and they had recommended an echocardiogram if she decides to proceed to surgery.    Of note, vitals were obtained and her BMI is 46 which I did not know at the time of the virtual visit.            Past Medical History:     Past Medical History:   Diagnosis Date     Chronic bilateral low back pain without sciatica      Obesity      DAE (obstructive sleep apnea)      Pulmonary hypertension (H)      Tricuspid regurgitation      Venous (peripheral) insufficiency             Past Surgical History:     Past Surgical History:   Procedure Laterality Date     ORTHOPEDIC SURGERY      2010 and 2012            Social History:     Social History     Tobacco Use     Smoking status: Former Smoker     Packs/day: 1.00     Years: 15.00     Pack years: 15.00     Types: Cigarettes     Smokeless tobacco: Never Used     Tobacco comment: quit around age 32   Substance Use Topics     Alcohol use: Yes     Comment: 2-4 drinks couple times per week            Family History:     Family History   Problem Relation Age of Onset     Hypertension Mother      Diabetes Father      Hypertension Father      Parkinsonism Father      Diabetes Sister      Hypertension Sister      Sleep Apnea Sister      Sleep Apnea Brother             Allergies:     Allergies   Allergen Reactions     Liquid Adhesive             Medications:     Current Outpatient Medications    Medication     acetaminophen (TYLENOL) 500 MG tablet     losartan (COZAAR) 25 MG tablet     meloxicam (MOBIC) 15 MG tablet     No current facility-administered medications for this visit.              Review of Systems:   A focused musculoskeletal and neurologic ROS was performed with pertinent positives and negatives noted in the HPI.  Additional systems were also reviewed and are documented at the bottom of the note.         Physical Exam:   Vitals: There were no vitals taken for this visit.  Musculoskeletal, Neurologic, and Spine:            Lumbar Spine:    Appearance - No gross stepoffs or deformities    Motor -     L2-3: Hip flexion 5/5 R and 5/5 L strength          L3/4:  Knee extension R 5/5 and L 5/5 strength         L4/5:  Foot dorsiflexion R 5/5 L 5/5 and       EHL dorsiflexion R 4/5 L 4/5 strength         S1:  Plantarflexion/Peroneal Muscles  R 5/5 and L 5/5 strength    Sensation: intact to light touch L3-S1 distribution BLE      Neurologic:      REFLEXES Left Right                  Patella 1+ 1+   Ankle jerk 1+ 1+   Babinski No upgoing great toe No upgoing great toe   Clonus 0 beats 0 beats     Hip Exam:  No pain with hip log roll and no tenderness over the greater trochanters.    Alignment:  Patient stands with a neutral right coronal imbalance           Imaging:   We ordered and independently reviewed new radiographs at this clinic visit. The results were discussed with the patient. Findings include:     Standing scoliosis radiographs obtained January 28 show severe degenerative scoliosis with right coronal imbalance    I also reviewed her CT of the thoracic and lumbar spine which shows severe degenerative changes    I reviewed her bone density study which shows near normal bone density       Assessment and Plan:     65 year old female with morbid obesity and degenerative scoliosis.    We had a discussion today about the risks and benefits of surgery.  I explained that surgery is high risk in  patients with a BMI over 40.  I expressed sympathy for the patient and I did not realize that her BMI was elevated prior to ordering some of the planning studies.  She expressed some dissatisfaction given that she had completed imaging recently in which she had known this prior to doing the imaging.  I expressed sympathy and I think that is certainly a reasonable position, but I did not know her BMI until the in person visit today.  I explained the issue is that with a BMI over 40, we have institutional protocols in place that recommend against surgery because of the high risk of infection and mechanical complications.  I explained that to bring her into an acceptable category of risk most likely she would need to lose approximately 50 pounds.  We would certainly want her BMI to be under 40 to be a candidate.  She expressed significant dissatisfaction as it is hard for her to move and walk.  I offered her referral to nutrition or to bariatric surgery to see if they could help her.  She is going to think about this and if she wants to meet with them she will call and I will make that referral.  I would be happy to see her again or discuss further if she is able to pursue weight loss.           Respectfully,  Gab León MD  Spine Surgery  [facility]

## 2021-01-29 NOTE — CONSULTS
Anesthesia Consult Note      Reason for consult: Chronic bilateral low back pain without sciatica    Date of Encounter: 1/29/2021  Referring Physician: Dr. León  Primary Care Physician:  Ga Ken Jr.      [facility]  Madyson Murray is a 65 year old female who presents for anesthesia consult in preparation for possible multi level spine fusion with Dr. León on date TBD at [facility]. History is obtained from the patient and medical records.     Patient who has been recently evaluated by Dr. León for low back pain which is severely limiting her activity. This has been present for approximately 5 years and her attempts at conservative management have not had lasting success. She has had multiple rounds of physical therapy and had an epidural steroid injection with minimal benefit. Her imaging was reviewed by Dr. León with notes:   I reviewed her lumbar radiographs which show severe degenerative scoliosis, measuring approximately 30 degrees, apex to the left side.  She also has relative lumbar flat back and diffuse spondylosis.  Her MRI shows severe spondylosis.     A surgical plan was discussed. Additional imaging studies were ordered and she will follow up for further discussion later today.     Her history is otherwise significant for obesity, HTN, pulmonary HTN, tricuspid regurgitation, and venous insufficiency. Her mild pulmonary HYPERTENSION was discovered with echocardiogram in 2018 completed for murmur. She was also found to have mild to moderate tricuspid regurgitation. She has had no Cardiology follow up since then. She did, however, have a sleep study, was diagnosed with DAE and is regularly using CPAP. She does plan to go back eventually to see if she needs an adjustment in pressure. She denies fever, cough, shortness of breath, chest pain or irregular HR. She reports some ankle edema. Her work involves prolonged standing so she is very fatigued by  the end of her days.     For primary care she is followed by Dr. Ken who has been working to get her BP better controlled.     Past Medical History  Past Medical History:   Diagnosis Date     Chronic bilateral low back pain without sciatica      Obesity      DAE (obstructive sleep apnea)      Pulmonary hypertension (H)      Tricuspid regurgitation      Venous (peripheral) insufficiency        Past Surgical History  Past Surgical History:   Procedure Laterality Date     ORTHOPEDIC SURGERY      2010 and 2012         Prior to Admission Medications  Current Outpatient Medications   Medication Sig Dispense Refill     acetaminophen (TYLENOL) 500 MG tablet Take 2 tablets (1,000 mg) by mouth every 8 hours as needed for mild pain       losartan (COZAAR) 25 MG tablet Take 2 tablets (50 mg) by mouth daily (Patient taking differently: Take 50 mg by mouth every morning ) 90 tablet 1     meloxicam (MOBIC) 15 MG tablet Take 1 tablet (15 mg) by mouth daily Take with food (Patient taking differently: Take 15 mg by mouth as needed Take with food) 30 tablet 1       Allergies  Allergies   Allergen Reactions     Liquid Adhesive        Social History  Social History     Socioeconomic History     Marital status:      Spouse name: Not on file     Number of children: Not on file     Years of education: Not on file     Highest education level: Not on file   Occupational History     Not on file   Social Needs     Financial resource strain: Not on file     Food insecurity     Worry: Not on file     Inability: Not on file     Transportation needs     Medical: Not on file     Non-medical: Not on file   Tobacco Use     Smoking status: Former Smoker     Packs/day: 1.00     Years: 15.00     Pack years: 15.00     Types: Cigarettes     Smokeless tobacco: Never Used     Tobacco comment: quit around age 32   Substance and Sexual Activity     Alcohol use: Yes     Comment: 2-4 drinks couple times per week     Drug use: No     Sexual  activity: Yes   Lifestyle     Physical activity     Days per week: Not on file     Minutes per session: Not on file     Stress: Not on file   Relationships     Social connections     Talks on phone: Not on file     Gets together: Not on file     Attends Christianity service: Not on file     Active member of club or organization: Not on file     Attends meetings of clubs or organizations: Not on file     Relationship status: Not on file     Intimate partner violence     Fear of current or ex partner: Not on file     Emotionally abused: Not on file     Physically abused: Not on file     Forced sexual activity: Not on file   Other Topics Concern     Parent/sibling w/ CABG, MI or angioplasty before 65F 55M? Not Asked   Social History Narrative     Not on file       Family History  Family History   Problem Relation Age of Onset     Hypertension Mother      Diabetes Father      Hypertension Father      Parkinsonism Father      Diabetes Sister      Hypertension Sister      Sleep Apnea Sister      Sleep Apnea Brother        Review of Systems    The complete review of systems is negative other than noted in the HPI or here.     ROS/MED HX     ENT/Pulmonary:     (+) sleep apnea, uses CPAP, tobacco use, Past use,     Neurologic:  - neg neurologic ROS     Cardiovascular: Comment: Venous peripheral insufficiency    (+) hypertension-Peripheral Vascular Disease-- Other. ---valvular problems/murmurs mild to mod TR. pulmonary hypertension, Previous cardiac testing   Echo: Date: 2018 Results:     Stress Test: Date: Results:     ECG Reviewed: Date: 1/29/21 Results:  NSR  Cath: Date: Results:     (-) taking anticoagulants/antiplatelets   METS/Exercise Tolerance: Comment: Prolonged standing with her work 3 - Able to walk 1-2 blocks without stopping   Hematologic:  - neg hematologic  ROS        Musculoskeletal: Comment: Chronic bilateral low back pain         GI/Hepatic:  - neg GI/hepatic ROS        Renal/Genitourinary:  - neg Renal ROS   "      Endo:     (+) Obesity,        Psychiatric:  - neg psychiatric ROS    (-) chronic opioid use history   Infectious Disease:  - neg infectious disease ROS        Malignancy:        Other:    (+) , H/O Chronic Pain,       Temp: 98  F (36.7  C) Temp src: Oral BP: (!) 141/77 Pulse: 91   Resp: 16 SpO2: 92 %         286 lbs 0 oz  5' 6\"[pt reported[   Body mass index is 46.16 kg/m .       Physical Exam    Airway   Mallampati: 1  TM distance: >6   Neck ROM: Full  Neck:  No goiter, trachea midline    Dental   Upper denture    Constitutional: Awake, alert, cooperative, no apparent distress, and appears stated age.  Eyes: Pupils equal, round and reactive to light, extra ocular muscles intact, sclera clear, conjunctiva normal. Glasses on.  HENT: Normocephalic, oral pharynx with moist mucus membranes  Respiratory: Clear to auscultation bilaterally, no crackles or wheezing. No cough or obvious dyspnea.  Cardiovascular: Regular rate and rhythm, soft murmur noted/ Carotids +2, no bruits. 1+ bilateral ankle edema. Palpable pulses to radial  DP and PT arteries.   GI: Normal bowel sounds, soft, non-distended, non-tender, no masses palpated. Difficult exam due to obese abdomen  Skin: Warm and dry.    Musculoskeletal: There is no redness, warmth, or swelling of the joints. Gross motor strength is normal.    Neurologic: Awake, alert, oriented to name, place and time. Cranial nerves II-XII are grossly intact. Gait is irregular, side to side.   Neuropsychiatric: Calm, cooperative. Normal affect.     Labs / testing: (personally reviewed) Labs pending    EKG today Normal sinus rhythm    2018 Echocardiogram    Interpretation Summary     The left ventricle is normal in size.  A sigmoid septum is present.  The visual ejection fraction is estimated at 60-65%.  Left ventricular diastolic function is indeterminate.  No regional wall motion abnormalities noted.  There is mild to moderate (1-2+) tricuspid regurgitation.  Mild (35-45mmHg) " pulmonary hypertension is present.  The right ventricle is normal in size and function.    MR lumbar spine 1/14/21                                                                   Impression: Multilevel lumbar spondylosis without significant spinal  canal stenosis at any level. Moderate foraminal stenosis on the right  at L2-3 and on the left at L5-S1.    Xray lumbar spine 1/14/21                                                                   Impression:  1.  No acute osseous abnormality.  2.  Multilevel degenerative changes with significant disc space loss  throughout the lumbar spine.  3. 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    Xray spine complete 1/28/21                                                            Impression:  1. Mild left convexed curvature of the thoracolumbar/lumbar spine with  apex at L3. Multilevel spondylosis of the spine, including at least  moderate disc space narrowing at L4-L5 and L5-S1 (although evaluation  for disc space narrowing in the lumbar spine is limited due to the  coronal curvature of the spine).  2. Positive global sagittal imbalance.   3. Positive coronal imbalance.  4. Weight bearing axis as detailed above.    CT lumbar spine 1/28/21                                                                  Impression:   1. Leftward lumbar scoliotic curvature centered at L3 with associated  degenerative changes as described above. Moderate right L2-3 and  moderate left L5-S1 foraminal stenosis similar to prior MRI. No  significant spinal canal narrowing and lumbar spine.  2. No foraminal or canal stenosis in the thoracic spine.     CT thoracic spine 1/28/21                                                       Impression:   1. Leftward lumbar scoliotic curvature centered at L3 with associated  degenerative changes as described above. Moderate right L2-3 and  moderate left L5-S1 foraminal stenosis similar to prior MRI. No  significant spinal canal  narrowing and lumbar spine.  2. No foraminal or canal stenosis in the thoracic spine.     Imaging and cardiac testing reviewed by this provider  Outside records reviewed from: Care Everywhere    ASSESSMENT and PLAN  Madyson Murray is a 65 year old female being considered for a multi level spine procedure by Dr. León. She has the following specific operative considerations:  RCRI: 0.4% significant cardiac event  VTE risk: 0.5%  PONV: 3 If 3 or > anti emetic intervention recommended with two or more meds    --Chronic bilateral low back pain without sciatica. Complex spine procedure being considered. Will need to hold meloxicam for 10 days prior to surgery.   --No personal history of problems with anesthesia. Reports brother had jaundice after a procedure on his leg in the 70s. Discussed with Anesthesia. Possible Halothane hepatitis. Patient reassured.   --Obesity. BMI 46. Truncal and central distribution. Airway feasible.   --HTN. Losartan and is closely followed by PCP for BP management. BP today 141/ 71. Pulmonary HTN (PA 35-45 mmHg) on echo in 2018. Possible DAE and poorly controlled HTN thought to be contributing at that time. Mild to moderate tricuspid regurgitation. Denies cardiac symptoms. Activity limited due to pain. Updated EKG NSR today. Informed patient that an echocardiogram should be updated for further evaluation of tricuspid regurgitation and pulmonary HYPERTENSION prior to surgery. She will schedule today. Patient has access to a gym in her apartment building. She was encouraged to try slow, progressive exercise using stationary bike prior to surgery.   --Former 15 pack year smoking history. Quit 30 years ago. Denies pulmonary symptoms. DAE with regular use of CPAP. Will be needed while hospitalized.   --Denies history of blood transfusion. Type and screen drawn today.   --Would require enhanced recovery pathway orders and education.    Dr. León notified of findings and plan.    All questions  answered for patient.       PB Rae CNS     ADDENDUM: Echocardiogram performed on 4/28/21  Interpretation Summary  Pulmonary artery systolic pressure is normal.  ______________________________________________________________________________  Left Ventricle  Global and regional left ventricular function is normal with an EF of 55-60%.  Left ventricular size is normal. Mild concentric wall thickening consistent  with left ventricular hypertrophy is present. Basal septum 21mm with no ELISA of  mitral leaflet or LVOT gradient. Left ventricular diastolic function is  normal. No regional wall motion abnormalities are seen.    Tricuspid Valve  The tricuspid valve is normal. Trace to mild tricuspid insufficiency is  present. The right ventricular systolic pressure is approximated at 29.2 mmHg  plus the right atrial pressure. Pulmonary artery systolic pressure is normal.       Preoperative Assessment Center  [facility]  Office phone: [phone]  Fax: [phone]

## 2021-01-29 NOTE — TELEPHONE ENCOUNTER
RN received message from Yajaira Chahal wondering why patient didn't complete her lab work and echocardiogram.  RN called patient and per patient she didn't do those things because her surgery is canceled per Dr. León's advise as patients need to lose weight and get her BMI under control.  RN expressed understanding.    Ca Freeman RN

## 2021-01-29 NOTE — ANESTHESIA PREPROCEDURE EVALUATION
"Anesthesia Pre-Procedure Evaluation    Patient: Madyson Murray   MRN:     [id number] Gender:   female   Age:    65 year old :      1955        Preoperative Diagnosis: * No surgery found *        LABS:  CBC: No results found for: WBC, HGB, HCT, PLT  BMP:   Lab Results   Component Value Date     2021     2019    POTASSIUM 4.3 2021    POTASSIUM 3.9 2019    CHLORIDE 107 2021    CHLORIDE 108 2019    CO2 28 2021    CO2 27 2019    BUN 13 2021    BUN 17 2019    CR 0.74 2021    CR 0.68 2019    GLC 91 2021    GLC 98 2019     COAGS: No results found for: PTT, INR, FIBR  POC: No results found for: BGM, HCG, HCGS  OTHER:   Lab Results   Component Value Date    A1C 5.7 2018    STUART 9.1 2021    ALBUMIN 3.9 2018    PROTTOTAL 7.6 2018    ALT 25 2018    AST 26 2018    ALKPHOS 88 2018    BILITOTAL 1.2 2018    TSH 2.38 2018        Preop Vitals    BP Readings from Last 3 Encounters:   21 (!) 158/93   19 136/70   10/30/19 (!) 150/65    Pulse Readings from Last 3 Encounters:   21 70   19 86   10/30/19 67      Resp Readings from Last 3 Encounters:   18 16   02/15/18 16    SpO2 Readings from Last 3 Encounters:   21 96%   19 95%   10/30/19 94%      Temp Readings from Last 1 Encounters:   21 98.9  F (37.2  C) (Tympanic)    Ht Readings from Last 1 Encounters:   21 1.676 m (5' 6\")      Wt Readings from Last 1 Encounters:   21 127.5 kg (281 lb)    Estimated body mass index is 45.35 kg/m  as calculated from the following:    Height as of 21: 1.676 m (5' 6\").    Weight as of 21: 127.5 kg (281 lb).     LDA:        Past Medical History:   Diagnosis Date     Chronic bilateral low back pain without sciatica      Obesity      DAE (obstructive sleep apnea)      Pulmonary hypertension (H)      Tricuspid regurgitation      Venous " (peripheral) insufficiency       Past Surgical History:   Procedure Laterality Date     ORTHOPEDIC SURGERY      2010 and 2012      Allergies   Allergen Reactions     Liquid Adhesive         Anesthesia Evaluation     . Pt has had prior anesthetic. Type: General.    No history of anesthetic complications          ROS/MED HX    ENT/Pulmonary:     (+) sleep apnea, uses CPAP, tobacco use, Past use,     Neurologic:  - neg neurologic ROS     Cardiovascular: Comment: Venous peripheral insufficiency    (+) hypertension-Peripheral Vascular Disease-- Other. ---valvular problems/murmurs mild to mod TR. pulmonary hypertension, Previous cardiac testing   Echo: Date: 2018 Results:    Stress Test: Date: Results:    ECG Reviewed: Date: 1/29/21 Results:  NSR  Cath: Date: Results:     (-) taking anticoagulants/antiplatelets   METS/Exercise Tolerance: Comment: Prolonged standing with her work 3 - Able to walk 1-2 blocks without stopping   Hematologic:  - neg hematologic  ROS       Musculoskeletal: Comment: Chronic bilateral low back pain        GI/Hepatic:  - neg GI/hepatic ROS       Renal/Genitourinary:  - neg Renal ROS       Endo:     (+) Obesity,       Psychiatric:  - neg psychiatric ROS    (-) chronic opioid use history   Infectious Disease:  - neg infectious disease ROS       Malignancy:       Other:    (+) , H/O Chronic Pain,                       PHYSICAL EXAM:   Mental Status/Neuro: A/A/O; Age Appropriate   Airway: Facies: Feasible  Mallampati: I  Mouth/Opening: Full  TM distance: > 6 cm  Neck ROM: Full   Respiratory: Auscultation: CTAB     Resp. Rate: Normal     Resp. Effort: Normal      CV: Rhythm: Regular  Heart: Murmur (Systolic; soft)  Edema: RLE; LLE   Comments:      Dental: Dentures  Dentures: Upper                JZG FV AN PLAN NO PONV RULE    [unfilled]  PAC Discussion and Assessment    ASA Classification: 3  Case is suitable for: [facility]  Anesthetic techniques and relevant risks discussed: GA  Invasive  monitoring and risk discussed: No    Possibility and Risk of blood transfusion discussed: Yes            PAC Resident/NP Anesthesia Assessment: Madyson Murray is a 65 year old female being considered for a multi level spine procedure by Dr. León. She has the following specific operative considerations:  RCRI: 0.4% significant cardiac event  VTE risk: 0.5%  PONV: 3 If 3 or > anti emetic intervention recommended with two or more meds    --Chronic bilateral low back pain without sciatica. Complex spine procedure being considered. Will need to hold meloxicam for 10 days prior to surgery.   --No personal history of problems with anesthesia. Reports brother had jaundice after a procedure on his leg in the 70s. Discussed with Anesthesia. Possible Halothane hepatitis. Patient reassured.   --Obesity. BMI 46. Truncal and central distribution. Airway feasible.   --HTN. Losartan and is closely followed by PCP for BP management. BP today 141/ 71. Pulmonary HTN (PA 35-45 mmHg) on echo in 2018. Possible DAE and poorly controlled HTN thought to be contributing at that time. Mild to moderate tricuspid regurgitation. Denies cardiac symptoms. Activity limited due to pain. Updated EKG NSR today. Informed patient that an echocardiogram should be updated for further evaluation of tricuspid regurgitation and pulmonary HYPERTENSION prior to surgery. She will schedule today. Patient has access to a gym in her apartment building. She was encouraged to try slow, progressive exercise using stationary bike prior to surgery.   --Former 15 pack year smoking history. Quit 30 years ago. Denies pulmonary symptoms. DAE with regular use of CPAP. Will be needed while hospitalized.   --Denies history of blood transfusion. Type and screen drawn today.   --Would require enhanced recovery pathway orders and education.    Dr. León notified of findings and plan.    All questions answered for patient.     ADDENDUM: Echocardiogram performed on  4/28/21  Interpretation Summary  Pulmonary artery systolic pressure is normal.  ______________________________________________________________________________  Left Ventricle  Global and regional left ventricular function is normal with an EF of 55-60%.  Left ventricular size is normal. Mild concentric wall thickening consistent  with left ventricular hypertrophy is present. Basal septum 21mm with no ELISA of  mitral leaflet or LVOT gradient. Left ventricular diastolic function is  normal. No regional wall motion abnormalities are seen.    Tricuspid Valve  The tricuspid valve is normal. Trace to mild tricuspid insufficiency is  present. The right ventricular systolic pressure is approximated at 29.2 mmHg  plus the right atrial pressure. Pulmonary artery systolic pressure is normal.        Reviewed and Signed by PAC Mid-Level Provider/Resident  Mid-Level Provider/Resident: PB Glass, CNS  Date: 1/29/21  Time: 7:37am                                PB Rae CNS

## 2021-02-03 DIAGNOSIS — Z12.11 SCREEN FOR COLON CANCER: ICD-10-CM

## 2021-02-03 PROCEDURE — 82274 ASSAY TEST FOR BLOOD FECAL: CPT | Performed by: FAMILY MEDICINE

## 2021-02-05 LAB — HEMOCCULT STL QL IA: NEGATIVE

## 2021-02-06 NOTE — RESULT ENCOUNTER NOTE
Ms. Murray,    -FIT test (screening test for colon cancer) was normal. ADVISE: rechecking this test in 1 year.    If you have further questions about the interpretation of your labs, [email or web address] is a good website to check out for further information.    Please contact the clinic if you have additional questions.  Thank you.    Sincerely,    Ga Ken MD

## 2021-02-16 ENCOUNTER — NURSE TRIAGE (OUTPATIENT)
Dept: NURSING | Facility: CLINIC | Age: 66
End: 2021-02-16

## 2021-02-16 NOTE — TELEPHONE ENCOUNTER
Patient calling for [facility] sleep center.   Saima Zapata RN on 2/16/2021 at 3:31 PM         Additional Information    Information only question and nurse able to answer    Protocols used: NO PROTOCOL AVAILABLE - INFORMATION ONLY-A-[place]

## 2021-02-25 NOTE — PATIENT INSTRUCTIONS

## 2021-02-25 NOTE — PROGRESS NOTES
Madyson is a 65 year old who is being evaluated via a billable video visit.      How would you like to obtain your AVS? MyChart  If the video visit is dropped, the invitation should be resent by: Send to e-mail at: [email or web address]  Will anyone else be joining your video visit? No      Video Start Time: 2:33 PM  Video-Visit Details    Type of service:  Video Visit    Video End Time:2:20 PM    Originating Location (pt. Location): Home    Distant Location (provider location):  [facility]     Platform used for Video Visit: COMARCO

## 2021-02-25 NOTE — PROGRESS NOTES
Sleep Consultation:    Date on this visit: 2/26/2021    Madyson Murray is sent by No ref. provider found for a sleep consultation regarding DAE.    Primary Physician: Ga Ken Jr.     Madyson Murray reports daytime sleepiness despite CPAP use for severe DAE. Her medical history is significant for pulmonary HTN, tricuspid regurgitation, venous insufficiency, chronic back pain.     Previous PSG 3/27/2018  AHI 39.2/hr. O2 misa 66%.   31 minutes below 89% SpO2.  CPAP 13 cm was largely effective.    She was prescribed auto CPAP 13-16 cm after her test. She feels her symptoms are going back to the way they were prior to her CPAP. She has been getting more tired over the last year. She denies other changes in that time. She is in a lot of pain daily. She is not sleeping through the night. She falls asleep on the couch after work either watching TV or using the iPad. Madyson falls asleep on the couch after work 2-3 times per week. She sleeps 1-2 hours. She has cut out caffeine since trying to lose weight.She has almost dozing at stop lights.  Patient was counseled on the importance of driving while alert, to pull over if drowsy, or nap before getting into the vehicle if sleepy. This has been getting worse in the last year. She thinks she needs a different mask. She notices leak around her cheeks. She wakes a couple times per night with leak. She uses the Dreamwear full face mask, small. I had her try it on and it looked slightly too big, sitting too low on her chin. It also did not look like it fit well at her nose. She has been using that mask for 3 years and it has always leaked. She tried a nasal mask once but the pressure felt too high with it. She thinks she does breathe through her mouth. She does not know if she snores with it. She does get a dry mouth, not nose.     The compliance data shows that the patient used the CPAP for 30/30 nights, 93.3% of nights for >4 hours.  The 90th% pressure is 14.8 cm.  The  average time in large leak is 37 min.  The average nightly usage is 5:01.  The average AHI is 2.3/hr.      Madyson goes to sleep at 7:00-8:00 PM during the week. She wakes up at 2:50 AM with an alarm. She falls asleep in 5-60 minutes.  Madyson has difficulty falling asleep if she falls asleep on the couch after work.  She wakes up 2-3 times a night for 5-10 minutes before falling back to sleep.  Madyson wakes up to uncertain reasons.  On days off, Madyson goes to sleep at 8:00-9:00 PM.  She wakes up as late as 4:00 AM without an alarm. She falls asleep in 5-60 minutes.  Patient gets an average of 5-6 hours of sleep per night.     Patient does watch TV in bed (about an hour) and does not use electronics in bed, worry in bed about anything and read in bed.     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  She works early day shifts.  She works 5 AM to 1 PM. She works at [employer] on [employer] in [place]. She is  and lives alone.    Madyson sleeps on her back (moslty) and side. She denies morning headaches, morning confusion and restless legs. Madyson denies any bruxism, sleep walking, sleep talking, dream enactment, sleep paralysis, cataplexy and hypnogogic/hypnopompic hallucinations.    Madyson denies difficulty breathing through her nose, claustrophobia, reflux at night and heartburn.      Madyson has lost 30-35 pounds in the last 2-3 years. She is working on losing more. She has a back surgery planned, but the surgeon requires more weight loss first.  Patient describes themself as a morning person.  She would prefer to go to sleep at 9:30 PM and wake up at 5:30 AM.  Patient's [place] Sleepiness score 22/24 consistent with severe daytime sleepiness.      Her oxygen was 92% at a recent clinic visit (1/29).      Allergies:    Allergies   Allergen Reactions     Liquid Adhesive        Medications:    Current Outpatient Medications   Medication Sig Dispense Refill     acetaminophen (TYLENOL) 500 MG tablet Take 2 tablets (1,000 mg) by mouth every 8  hours as needed for mild pain       losartan (COZAAR) 25 MG tablet Take 2 tablets (50 mg) by mouth daily (Patient taking differently: Take 50 mg by mouth every morning ) 90 tablet 1     meloxicam (MOBIC) 15 MG tablet Take 1 tablet (15 mg) by mouth daily Take with food (Patient taking differently: Take 15 mg by mouth as needed Take with food) 30 tablet 1       Problem List:  Patient Active Problem List    Diagnosis Date Noted     Somatic dysfunction of lumbar region 06/01/2020     Priority: Medium     Somatic dysfunction of sacral region 06/01/2020     Priority: Medium     Sacral pain 06/01/2020     Priority: Medium     Thoracic segment dysfunction 06/01/2020     Priority: Medium     Right shoulder pain, unspecified chronicity 06/01/2020     Priority: Medium     Chronic bilateral low back pain without sciatica 10/18/2019     Priority: Medium     Pulmonary hypertension (H) 03/14/2018     Priority: Medium     Has history of pulmonary hypertension that was seen on echocardiogram in 2018.  Sleep study revealed obstructive sleep apnea thought to be contributing.  She also has poorly controlled HTN that is contributing. She is presently on an ARB for treatment.          Non-rheumatic tricuspid valve insufficiency 03/14/2018     Priority: Medium     Obstructive sleep apnea syndrome 03/14/2018     Priority: Medium     Venous (peripheral) insufficiency 03/14/2018     Priority: Medium     Morbid obesity (H) 02/05/2018     Priority: Medium     CARDIOVASCULAR SCREENING; LDL GOAL LESS THAN 130 02/05/2018     Priority: Medium        Past Medical/Surgical History:  Past Medical History:   Diagnosis Date     Chronic bilateral low back pain without sciatica      Obesity      DAE (obstructive sleep apnea)      Pulmonary hypertension (H)      Tricuspid regurgitation      Venous (peripheral) insufficiency      Past Surgical History:   Procedure Laterality Date     ORTHOPEDIC SURGERY      2010 and 2012       Social History:  Social  History     Socioeconomic History     Marital status:      Spouse name: Not on file     Number of children: Not on file     Years of education: Not on file     Highest education level: Not on file   Occupational History     Not on file   Social Needs     Financial resource strain: Not on file     Food insecurity     Worry: Not on file     Inability: Not on file     Transportation needs     Medical: Not on file     Non-medical: Not on file   Tobacco Use     Smoking status: Former Smoker     Packs/day: 1.00     Years: 15.00     Pack years: 15.00     Types: Cigarettes     Smokeless tobacco: Never Used     Tobacco comment: quit around age 32   Substance and Sexual Activity     Alcohol use: Yes     Comment: 2-4 drinks couple times per week     Drug use: No     Sexual activity: Yes   Lifestyle     Physical activity     Days per week: Not on file     Minutes per session: Not on file     Stress: Not on file   Relationships     Social connections     Talks on phone: Not on file     Gets together: Not on file     Attends Restorationism service: Not on file     Active member of club or organization: Not on file     Attends meetings of clubs or organizations: Not on file     Relationship status: Not on file     Intimate partner violence     Fear of current or ex partner: Not on file     Emotionally abused: Not on file     Physically abused: Not on file     Forced sexual activity: Not on file   Other Topics Concern     Parent/sibling w/ CABG, MI or angioplasty before 65F 55M? Not Asked   Social History Narrative     Not on file       Family History:  Family History   Problem Relation Age of Onset     Hypertension Mother      Diabetes Father      Hypertension Father      Parkinsonism Father      Diabetes Sister      Hypertension Sister      Sleep Apnea Sister      Sleep Apnea Brother        Review of Systems:  A complete review of systems reviewed by me is negative with the exeption of what has been mentioned in the history of  "present illness.  CONSTITUTIONAL: NEGATIVE for weight gain/loss, fever, chills, sweats or night sweats, drug allergies.  EYES: NEGATIVE for changes in vision, blind spots, double vision.  ENT: NEGATIVE for ear pain, sore throat, sinus pain, post-nasal drip, runny nose, bloody nose  CARDIAC: NEGATIVE for fast heartbeats or fluttering in chest, chest pain or pressure, breathlessness when lying flat, swollen legs or swollen feet.  NEUROLOGIC: NEGATIVE headaches, weakness or numbness in the arms or legs.  DERMATOLOGIC: NEGATIVE for rashes, new moles or change in mole(s)  PULMONARY: NEGATIVE SOB at rest, SOB with activity, dry cough, productive cough, coughing up blood, wheezing or whistling when breathing.    GASTROINTESTINAL: NEGATIVE for nausea or vomitting, loose or watery stools, fat or grease in stools, constipation, abdominal pain, bowel movements black in color or blood noted.  GENITOURINARY: NEGATIVE for pain during urination, blood in urine, urinating more frequently than usual, irregular menstrual periods.  MUSCULOSKELETAL: NEGATIVE for muscle pain, bone or joint pain, swollen joints.  MUSCULOSKELETAL:  POSITIVE for  Back pain  ENDOCRINE: NEGATIVE for increased thirst or urination, diabetes.  LYMPHATIC: NEGATIVE for swollen lymph nodes, lumps or bumps in the breasts or nipple discharge.  MENTAL HEALTH: POSITIVE for a little depressed due to back pain    Physical Examination:  Patient reported vitals: Ht: 5'6\". Wt: 265 pounds. BMI 42.8.        [place] Total Score 2/25/2021   Total score - [place] 22       JUNIOR Total Score: 15 (02/25/21 1000)    GENERAL APPEARANCE: healthy, alert, no distress and cooperative  EYES: Eyes grossly normal to inspection and wearing glasses  HENT: oral mucous membranes moist and oropharynx clear  NECK: no asymmetry, masses, or scars  RESP: no respiratory distress, cough or wheeze  Mallampati Class: II.  Tonsillar Stage: 3  extending beyond pillars.    Impression/Plan:    (G47.33) " Obstructive sleep apnea syndrome  (primary encounter diagnosis), (R40.0) Sleepiness  Comment: Madyson presents with residual sleepiness, worsening in the last year, despite being on CPAP. Her AHI on the download is in the normal range. However, her download shows high mask leak, about 34 minutes on average. Her average use is 5 hours per night, down from 5:41 in 10/2018. She works early hours, getting up at 2:50 AM, so tries to go to bed around 7-8 PM. She has been falling asleep after work 2-3 days per week for 1-2 hours. She then has some difficulty getting to sleep that night. She has had some drowsy driving. Her last ECHO was in 2/2018 and showed pulmonary HTN. Her SpO2 was 92% in the daytime. I am suspicious of residual hypoxemia despite her apnea being well-treated. Back pain could be contributing to increased sleep disruption, sleepiness. She only started meloxicam in the last month, so that is not responsible for her sleepiness.  Plan: Comprehensive DME, HST-Home Sleep Apnea Test        Continue auto CPAP 13-16 cm. Look at alternative masks with [facility]. Try to get more sleep with CPAP. Either nap for an hour with CPAP after work, or make sure to keep it on all night and avoid dozing. I have ordered a home sleep study to assess for residual hypoxemia. If her usage and leak improve and the HST is not revealing, I may consider a stimulant. In the mean time, she was advised to pull over if she becomes sleepy while driving.      She will follow up with me in approximately two weeks after her sleep study has been competed to review the results and discuss plan of care.       Polysomnography reviewed.  Obstructive sleep apnea reviewed.  Complications of untreated sleep apnea were reviewed.  57 minutes were spent on the date of the encounter doing chart review, history and exam, documentation and further activities as noted above.   Bennett Goltz, PA-C     CC: No ref. provider found

## 2021-02-26 ENCOUNTER — VIRTUAL VISIT (OUTPATIENT)
Dept: SLEEP MEDICINE | Facility: CLINIC | Age: 66
End: 2021-02-26
Payer: COMMERCIAL

## 2021-02-26 DIAGNOSIS — R40.0 SLEEPINESS: ICD-10-CM

## 2021-02-26 DIAGNOSIS — G47.33 OBSTRUCTIVE SLEEP APNEA SYNDROME: Primary | ICD-10-CM

## 2021-02-26 PROCEDURE — 99204 OFFICE O/P NEW MOD 45 MIN: CPT | Mod: 95 | Performed by: PHYSICIAN ASSISTANT

## 2021-03-30 DIAGNOSIS — G89.29 CHRONIC BILATERAL LOW BACK PAIN WITHOUT SCIATICA: ICD-10-CM

## 2021-03-30 DIAGNOSIS — M54.50 CHRONIC BILATERAL LOW BACK PAIN WITHOUT SCIATICA: ICD-10-CM

## 2021-03-30 DIAGNOSIS — M51.369 DEGENERATIVE DISC DISEASE, LUMBAR: ICD-10-CM

## 2021-03-31 RX ORDER — MELOXICAM 15 MG/1
15 TABLET ORAL DAILY
Qty: 30 TABLET | Refills: 0 | Status: SHIPPED | OUTPATIENT
Start: 2021-03-31 | End: 2021-05-26

## 2021-03-31 NOTE — TELEPHONE ENCOUNTER
meloxicam (MOBIC) 15 MG tablet   Take 1 tablet (15 mg) by mouth daily Take with food     Last Written Prescription Date:  1/12/21  Last Fill Quantity: 30,   # refills: 1  Last Office Visit : 1/12/21  Future Office visit:  none    Routing refill request to provider for review/approval because:  Per refill protocol, 90 days with 0 refill - RTC for further refills.     Deferred to provider for remaining 30 day. Pended.

## 2021-04-09 ASSESSMENT — ENCOUNTER SYMPTOMS
PARALYSIS: 0
TREMORS: 0
DIZZINESS: 1
WEAKNESS: 1
LOSS OF CONSCIOUSNESS: 0
MUSCLE WEAKNESS: 0
DEPRESSION: 1
DISTURBANCES IN COORDINATION: 1
HEADACHES: 1
TINGLING: 0
INSOMNIA: 1
SEIZURES: 0
MUSCLE CRAMPS: 0
ARTHRALGIAS: 0
PANIC: 0
JOINT SWELLING: 0
NUMBNESS: 0
NECK PAIN: 0
MYALGIAS: 1
NERVOUS/ANXIOUS: 1
BACK PAIN: 1
STIFFNESS: 0
SPEECH CHANGE: 0
DECREASED CONCENTRATION: 1
MEMORY LOSS: 0

## 2021-04-13 ENCOUNTER — TELEPHONE (OUTPATIENT)
Dept: SLEEP MEDICINE | Facility: CLINIC | Age: 66
End: 2021-04-13

## 2021-04-13 ENCOUNTER — OFFICE VISIT (OUTPATIENT)
Dept: ORTHOPEDICS | Facility: CLINIC | Age: 66
End: 2021-04-13
Payer: COMMERCIAL

## 2021-04-13 VITALS — HEIGHT: 67 IN | WEIGHT: 255 LBS | BODY MASS INDEX: 40.02 KG/M2

## 2021-04-13 DIAGNOSIS — M54.16 LUMBAR RADICULOPATHY: ICD-10-CM

## 2021-04-13 DIAGNOSIS — M41.50 DEGENERATIVE SCOLIOSIS IN ADULT PATIENT: Primary | ICD-10-CM

## 2021-04-13 PROCEDURE — 99214 OFFICE O/P EST MOD 30 MIN: CPT | Performed by: ORTHOPAEDIC SURGERY

## 2021-04-13 ASSESSMENT — MIFFLIN-ST. JEOR: SCORE: 1739.3

## 2021-04-13 NOTE — NURSING NOTE
Teaching Flowsheet   Relevant Diagnosis: T10 to Pelvis Fusion  Teaching Topic: Pre op teaching     Person(s) involved in teaching:   Patient     Motivation Level:  Asks Questions: Yes  Eager to Learn: Yes  Cooperative: Yes  Receptive (willing/able to accept information): Yes  Any cultural factors/[place] beliefs that may influence understanding or compliance? No       Patient demonstrates understanding of the following:  Reason for the appointment, diagnosis and treatment plan: Yes  Knowledge of proper use of medications and conditions for which they are ordered (with special attention to potential side effects or drug interactions): Yes  Which situations necessitate calling provider and whom to contact: Yes       Teaching Concerns Addressed: RN discussed all aspects of pre and post op surgery with patient. Patient lost weight and now her BMI 39. Patient is reminded to do labs and echocardiogram.  is scheduling for patient. RN notified Alcira to call patient with surgery date and PAC appt. Patient has no other questions.       Proper use and care of meds (medical equip, care aids, etc.): Yes  Nutritional needs and diet plan: Yes  Pain management techniques: Yes  Wound Care: Yes  How and/when to access community resources: Yes     Instructional Materials Used/Given: Pre op packet and antibacterial soap.     Time spent with patient: 15 minutes.

## 2021-04-13 NOTE — TELEPHONE ENCOUNTER
RETURNED PT CALL AND THE PT STATED THAT SHE FIGURED OUT HOW TO CHANGE THE F20 CUSHION AND FURTHER ASSISTANCE IS NOT NEEDED.

## 2021-04-13 NOTE — PROGRESS NOTES
Spine Surgery Return Clinic Visit      Chief Complaint:   RECHECK (follow up experiencing back pain again patient stated that pain has been gradually getting worse and she is having pain with walking. )      Interval HPI:  Symptom Profile Including: location of symptoms, onset, severity, exacerbating/alleviating factors, previous treatments:        Madyson Murray is a 66 year old female who returns today for evaluation of her degenerative scoliosis.  We last met together in January.  At that time she underwent a visit with our anesthesia clinic and they had recommended an echocardiogram but otherwise she was felt appropriate for surgery.  In addition she had a bone density evaluation and imaging studies.  Her BMI was 46 and I felt that she needed to try and lose some weight before surgery and to her credit she has really done an excellent job of this.  Her BMI is down to 39 today and she has lost about 30 pounds with so I really congratulated her on this.    Her symptoms are unchanged.  She reports severe low back pain when attempting to stand.  She feels like she is tipping over to the side.  Feels like she cannot stand up straight.            Past Medical History:     Past Medical History:   Diagnosis Date     Chronic bilateral low back pain without sciatica      Obesity      DAE (obstructive sleep apnea)      Pulmonary hypertension (H)      Tricuspid regurgitation      Venous (peripheral) insufficiency             Past Surgical History:     Past Surgical History:   Procedure Laterality Date     ORTHOPEDIC SURGERY      2010 and 2012, total knees            Social History:     Social History     Tobacco Use     Smoking status: Former Smoker     Packs/day: 1.00     Years: 15.00     Pack years: 15.00     Types: Cigarettes     Smokeless tobacco: Never Used     Tobacco comment: quit around age 32   Substance Use Topics     Alcohol use: Yes     Comment: 2-4 drinks couple times per week            Family History:  "    Family History   Problem Relation Age of Onset     Hypertension Mother      Diabetes Father      Hypertension Father      Parkinsonism Father      Diabetes Sister      Hypertension Sister      Sleep Apnea Sister      Sleep Apnea Brother             Allergies:     Allergies   Allergen Reactions     Liquid Adhesive             Medications:     Current Outpatient Medications   Medication     acetaminophen (TYLENOL) 500 MG tablet     losartan (COZAAR) 25 MG tablet     meloxicam (MOBIC) 15 MG tablet     No current facility-administered medications for this visit.              Review of Systems:   A focused musculoskeletal and neurologic ROS was performed with pertinent positives and negatives noted in the HPI.  Additional systems were also reviewed and are documented at the bottom of the note.         Physical Exam:   Vitals: Ht 1.702 m (5' 7\")   Wt 115.7 kg (255 lb)   BMI 39.94 kg/m    Musculoskeletal, Neurologic, and Spine:            Lumbar Spine:    Appearance - No gross stepoffs or deformities    Motor -     L2-3: Hip flexion 5/5 R and 5/5 L strength          L3/4:  Knee extension R 5/5 and L 5/5 strength         L4/5:  Foot dorsiflexion R 5/5 L 5/5 and       EHL dorsiflexion R 4/5 L 5/5 strength         S1:  Plantarflexion/Peroneal Muscles  R 5/5 and L 5/5 strength    Sensation: intact to light touch L3-S1 distribution BLE      Neurologic:      REFLEXES Left Right                  Patella 1+ 1+   Ankle jerk 1+ 1+   Babinski No upgoing great toe No upgoing great toe   Clonus 0 beats 0 beats     Hip Exam:  No pain with hip log roll and no tenderness over the greater trochanters.    Alignment:  Patient stands with coronal imbalance to the right and positive sagitall imbalance           Imaging:   We ordered and independently reviewed new radiographs at this clinic visit. The results were discussed with the patient. Findings include:     Standing scoliosis radiographs show degenerative scoliosis with coronal " plane imbalance to the right side measuring 7 cm based on a C7 david line.  She has a 28 degree degenerative scoliosis in the lumbar spine.  On the lateral view, there is 25 degrees of kyphosis through the thoracolumbar junction.  Lumbar lordosis is 31 and pelvic incidence is 36.    CT scan thoracolumbar spine January 2021 shows severe degenerative changes throughout the lumbar spine with near autofusion of the L1-2 disc and large bridging osteophytes at L2-3 and L3-4.  Severe degenerative changes below this.  Multilevel bridging osteophytes at the mid thoracic levels.    Lumbar spine MRI January 14, 2021 shows again multilevel severe degenerative changes no severe central stenosis           Assessment and Plan:     66 year old female with degenerative scoliosis and most significantly coronal plane imbalance with difficulty standing and walking.  She recently has decreased her BMI from 46 down to 39.    I congratulated her on her weight loss today.  I think with the dedication she has shown and her medical work-up otherwise being favorable that it would be reasonable to consider surgery in her case.  If we do proceed to surgery, it would require a T10 to pelvis type operation with multilevel interbody fusion and osteotomy.  I explained that this is a major operation.  We talked about the morbidity risk involved and I explained risk of perioperative complications and emphasized infection in her case with the elevated BMI, as well as risk of implant failure or fracture.  I explained the things we try to do to mitigate these risks but I explained that with a large surgery like this it is usual to expect a perioperative complication event we might need to manage.  This is in addition to the risks noted below.    Risks of this surgery include risk of infection, risk of dural tear resulting in CSF leak which might result in headaches, or possible need for lumbar drain, or possible revision surgery in the setting of a  persistent leak. Risk of hematoma or seroma resulting in wound complications.  Possible nerve root injury resulting in numbness weakness or paralysis into the arms or legs. Possible radiculitis which could result in similar symptoms or could result in significant neurogenic type pain. There is also a risk of delayed onset nerve root pals or radiculitis, which I explained is potentially due to stretch injury or possibly due to delayed onset swelling, and is sometimes temporary but can also be permanent.  Risk of incomplete decompression which might require revision surgery in the future.  Risk of adjacent segment problems requiring surgery in the future. Risk of incomplete relief of symptoms possibly requiring revision surgery in the future. Furthermore, although rare, there are risks of major vessel injury such as to the major vessels anteriorly or to the bowel from the surgery.  Sometimes this can happen if an instrument is passed anteriorly through the disc space. There is a risk of nonunion which might require revision surgery in the future, and risk of hardware complications from the instrumentation.  I do use imaging to help guide the placement of the instrumentation, but even with this there is a chance of implant malposition or problem.  There is a risk of blood clots in the legs or the lungs.  Lastly, although rare, there are certainly risks of the anesthetic including stroke heart attack and death.      I explained about a 6 to 12-month time to get full bone healing and fully recover from the surgery in about 12 weeks to feel like she is getting over the acute portion of surgery.  She understands and would like to proceed.  Were going to move forward with some of the operative planning.  In the meantime she is going to continue working on her weight loss and I explained that as much as possible this would be helpful to minimize the risk of implant failure and fracture as well as infection  risk.    Respectfully,  Gab León MD  Spine Surgery  [facility]    Answers for HPI/ROS submitted by the patient on 4/9/2021   General Symptoms: No  Skin Symptoms: No  HENT Symptoms: No  EYE SYMPTOMS: No  HEART SYMPTOMS: No  LUNG SYMPTOMS: No  INTESTINAL SYMPTOMS: No  URINARY SYMPTOMS: No  GYNECOLOGIC SYMPTOMS: No  BREAST SYMPTOMS: No  SKELETAL SYMPTOMS: Yes  BLOOD SYMPTOMS: No  NERVOUS SYSTEM SYMPTOMS: Yes  MENTAL HEALTH SYMPTOMS: Yes  Back pain: Yes  Muscle aches: Yes  Neck pain: No  Swollen joints: No  Joint pain: No  Bone pain: No  Muscle cramps: No  Muscle weakness: No  Joint stiffness: No  Bone fracture: No  Trouble with coordination: Yes  Dizziness or trouble with balance: Yes  Fainting or black-out spells: No  Memory loss: No  Headache: Yes  Seizures: No  Speech problems: No  Tingling: No  Tremor: No  Weakness: Yes  Difficulty walking: Yes  Paralysis: No  Numbness: No  Nervous or Anxious: Yes  Depression: Yes  Trouble sleeping: Yes  Trouble thinking or concentrating: Yes  Mood changes: Yes  Panic attacks: No

## 2021-04-13 NOTE — LETTER
4/13/2021         RE: Madyson Murray  [address]  [place] [address]        Dear Colleague,    Thank you for referring your patient, Madyson Murray, to the [facility] [place]. Please see a copy of my visit note below.    Spine Surgery Return Clinic Visit      Chief Complaint:   RECHECK (follow up experiencing back pain again patient stated that pain has been gradually getting worse and she is having pain with walking. )      Interval HPI:  Symptom Profile Including: location of symptoms, onset, severity, exacerbating/alleviating factors, previous treatments:        Madyson Murray is a 66 year old female who returns today for evaluation of her degenerative scoliosis.  We last met together in January.  At that time she underwent a visit with our anesthesia clinic and they had recommended an echocardiogram but otherwise she was felt appropriate for surgery.  In addition she had a bone density evaluation and imaging studies.  Her BMI was 46 and I felt that she needed to try and lose some weight before surgery and to her credit she has really done an excellent job of this.  Her BMI is down to 39 today and she has lost about 30 pounds with so I really congratulated her on this.    Her symptoms are unchanged.  She reports severe low back pain when attempting to stand.  She feels like she is tipping over to the side.  Feels like she cannot stand up straight.            Past Medical History:     Past Medical History:   Diagnosis Date     Chronic bilateral low back pain without sciatica      Obesity      DAE (obstructive sleep apnea)      Pulmonary hypertension (H)      Tricuspid regurgitation      Venous (peripheral) insufficiency             Past Surgical History:     Past Surgical History:   Procedure Laterality Date     ORTHOPEDIC SURGERY      2010 and 2012, total knees            Social History:     Social History     Tobacco Use     Smoking status: Former Smoker      "Packs/day: 1.00     Years: 15.00     Pack years: 15.00     Types: Cigarettes     Smokeless tobacco: Never Used     Tobacco comment: quit around age 32   Substance Use Topics     Alcohol use: Yes     Comment: 2-4 drinks couple times per week            Family History:     Family History   Problem Relation Age of Onset     Hypertension Mother      Diabetes Father      Hypertension Father      Parkinsonism Father      Diabetes Sister      Hypertension Sister      Sleep Apnea Sister      Sleep Apnea Brother             Allergies:     Allergies   Allergen Reactions     Liquid Adhesive             Medications:     Current Outpatient Medications   Medication     acetaminophen (TYLENOL) 500 MG tablet     losartan (COZAAR) 25 MG tablet     meloxicam (MOBIC) 15 MG tablet     No current facility-administered medications for this visit.              Review of Systems:   A focused musculoskeletal and neurologic ROS was performed with pertinent positives and negatives noted in the HPI.  Additional systems were also reviewed and are documented at the bottom of the note.         Physical Exam:   Vitals: Ht 1.702 m (5' 7\")   Wt 115.7 kg (255 lb)   BMI 39.94 kg/m    Musculoskeletal, Neurologic, and Spine:            Lumbar Spine:    Appearance - No gross stepoffs or deformities    Motor -     L2-3: Hip flexion 5/5 R and 5/5 L strength          L3/4:  Knee extension R 5/5 and L 5/5 strength         L4/5:  Foot dorsiflexion R 5/5 L 5/5 and       EHL dorsiflexion R 4/5 L 5/5 strength         S1:  Plantarflexion/Peroneal Muscles  R 5/5 and L 5/5 strength    Sensation: intact to light touch L3-S1 distribution BLE      Neurologic:      REFLEXES Left Right                  Patella 1+ 1+   Ankle jerk 1+ 1+   Babinski No upgoing great toe No upgoing great toe   Clonus 0 beats 0 beats     Hip Exam:  No pain with hip log roll and no tenderness over the greater trochanters.    Alignment:  Patient stands with coronal imbalance to the right and " positive sagitall imbalance           Imaging:   We ordered and independently reviewed new radiographs at this clinic visit. The results were discussed with the patient. Findings include:     Standing scoliosis radiographs show degenerative scoliosis with coronal plane imbalance to the right side measuring 7 cm based on a C7 david line.  She has a 28 degree degenerative scoliosis in the lumbar spine.  On the lateral view, there is 25 degrees of kyphosis through the thoracolumbar junction.  Lumbar lordosis is 31 and pelvic incidence is 36.    CT scan thoracolumbar spine January 2021 shows severe degenerative changes throughout the lumbar spine with near autofusion of the L1-2 disc and large bridging osteophytes at L2-3 and L3-4.  Severe degenerative changes below this.  Multilevel bridging osteophytes at the mid thoracic levels.    Lumbar spine MRI January 14, 2021 shows again multilevel severe degenerative changes no severe central stenosis           Assessment and Plan:     66 year old female with degenerative scoliosis and most significantly coronal plane imbalance with difficulty standing and walking.  She recently has decreased her BMI from 46 down to 39.    I congratulated her on her weight loss today.  I think with the dedication she has shown and her medical work-up otherwise being favorable that it would be reasonable to consider surgery in her case.  If we do proceed to surgery, it would require a T10 to pelvis type operation with multilevel interbody fusion and osteotomy.  I explained that this is a major operation.  We talked about the morbidity risk involved and I explained risk of perioperative complications and emphasized infection in her case with the elevated BMI, as well as risk of implant failure or fracture.  I explained the things we try to do to mitigate these risks but I explained that with a large surgery like this it is usual to expect a perioperative complication event we might need to  manage.  This is in addition to the risks noted below.    Risks of this surgery include risk of infection, risk of dural tear resulting in CSF leak which might result in headaches, or possible need for lumbar drain, or possible revision surgery in the setting of a persistent leak. Risk of hematoma or seroma resulting in wound complications.  Possible nerve root injury resulting in numbness weakness or paralysis into the arms or legs. Possible radiculitis which could result in similar symptoms or could result in significant neurogenic type pain. There is also a risk of delayed onset nerve root pals or radiculitis, which I explained is potentially due to stretch injury or possibly due to delayed onset swelling, and is sometimes temporary but can also be permanent.  Risk of incomplete decompression which might require revision surgery in the future.  Risk of adjacent segment problems requiring surgery in the future. Risk of incomplete relief of symptoms possibly requiring revision surgery in the future. Furthermore, although rare, there are risks of major vessel injury such as to the major vessels anteriorly or to the bowel from the surgery.  Sometimes this can happen if an instrument is passed anteriorly through the disc space. There is a risk of nonunion which might require revision surgery in the future, and risk of hardware complications from the instrumentation.  I do use imaging to help guide the placement of the instrumentation, but even with this there is a chance of implant malposition or problem.  There is a risk of blood clots in the legs or the lungs.  Lastly, although rare, there are certainly risks of the anesthetic including stroke heart attack and death.      I explained about a 6 to 12-month time to get full bone healing and fully recover from the surgery in about 12 weeks to feel like she is getting over the acute portion of surgery.  She understands and would like to proceed.  Were going to move  forward with some of the operative planning.  In the meantime she is going to continue working on her weight loss and I explained that as much as possible this would be helpful to minimize the risk of implant failure and fracture as well as infection risk.    Respectfully,  Gab León MD  Spine Surgery  [facility]    Answers for HPI/ROS submitted by the patient on 4/9/2021   General Symptoms: No  Skin Symptoms: No  HENT Symptoms: No  EYE SYMPTOMS: No  HEART SYMPTOMS: No  LUNG SYMPTOMS: No  INTESTINAL SYMPTOMS: No  URINARY SYMPTOMS: No  GYNECOLOGIC SYMPTOMS: No  BREAST SYMPTOMS: No  SKELETAL SYMPTOMS: Yes  BLOOD SYMPTOMS: No  NERVOUS SYSTEM SYMPTOMS: Yes  MENTAL HEALTH SYMPTOMS: Yes  Back pain: Yes  Muscle aches: Yes  Neck pain: No  Swollen joints: No  Joint pain: No  Bone pain: No  Muscle cramps: No  Muscle weakness: No  Joint stiffness: No  Bone fracture: No  Trouble with coordination: Yes  Dizziness or trouble with balance: Yes  Fainting or black-out spells: No  Memory loss: No  Headache: Yes  Seizures: No  Speech problems: No  Tingling: No  Tremor: No  Weakness: Yes  Difficulty walking: Yes  Paralysis: No  Numbness: No  Nervous or Anxious: Yes  Depression: Yes  Trouble sleeping: Yes  Trouble thinking or concentrating: Yes  Mood changes: Yes  Panic attacks: No

## 2021-04-13 NOTE — NURSING NOTE
"Reason For Visit:   Chief Complaint   Patient presents with     RECHECK     follow up experiencing back pain again patient stated that pain has been gradually getting worse and she is having pain with walking.        Primary MD: Ga Ken Jr.  Ref. MD: Jl     ?  No  Date of surgery: None   Type of surgery: none .  Smoker: No  Request smoking cessation information: No    Ht 1.702 m (5' 7\")   Wt 115.7 kg (255 lb)   BMI 39.94 kg/m           Oswestry (MARYSOL) Questionnaire    OSWESTRY DISABILITY INDEX 4/9/2021   Count 9   Sum 24   Oswestry Score (%) 53.33            Neck Disability Index (NDI) Questionnaire    No flowsheet data found.                Promis 10 Assessment    PROMIS 10 4/9/2021   In general, would you say your health is: Fair   In general, would you say your quality of life is: Poor   In general, how would you rate your physical health? Poor   In general, how would you rate your mental health, including your mood and your ability to think? Fair   In general, how would you rate your satisfaction with your social activities and relationships? Poor   In general, please rate how well you carry out your usual social activities and roles Poor   To what extent are you able to carry out your everyday physical activities such as walking, climbing stairs, carrying groceries, or moving a chair? A little   How often have you been bothered by emotional problems such as feeling anxious, depressed or irritable? Often   How would you rate your fatigue on average? Moderate   How would you rate your pain on average?   0 = No Pain  to  10 = Worst Imaginable Pain 8   In general, would you say your health is: 2   In general, would you say your quality of life is: 1   In general, how would you rate your physical health? 1   In general, how would you rate your mental health, including your mood and your ability to think? 2   In general, how would you rate your satisfaction with your social activities and " relationships? 1   In general, please rate how well you carry out your usual social activities and roles. (This includes activities at home, at work and in your community, and responsibilities as a parent, child, spouse, employee, friend, etc.) 1   To what extent are you able to carry out your everyday physical activities such as walking, climbing stairs, carrying groceries, or moving a chair? 2   In the past 7 days, how often have you been bothered by emotional problems such as feeling anxious, depressed, or irritable? 4   In the past 7 days, how would you rate your fatigue on average? 3   In the past 7 days, how would you rate your pain on average, where 0 means no pain, and 10 means worst imaginable pain? 8   Global Mental Health Score 6   Global Physical Health Score 8   PROMIS TOTAL - SUBSCORES 14                Marcos Burnham ATC

## 2021-04-16 ENCOUNTER — TEAM CONFERENCE (OUTPATIENT)
Dept: ORTHOPEDICS | Facility: CLINIC | Age: 66
End: 2021-04-16

## 2021-04-16 NOTE — TELEPHONE ENCOUNTER
Complex spine case review    Date of meetin21  Presented by:   Dr. Mau MD  Recorded by:   Charity Floyd PA-C     *Final Report*    [facility]    Madyson Murray was discussed at the Complex Spine Case Review on .  Representatives from Spine Surgery, Neurosurgery, Radiology and Anesthesia were present at the meeting.    Concerns:  Obesity- patient was able to lower BMI from 46 to 39  Hx of pulmonary hypertension    Recommendation:  Anesthesia recommends Echocardiogram for evaluation of tricuspid regurgitation and pulmonary HTN.   If this is normal, we do not need to re-discuss at complex spine.  If abnormal, will need to re-discuss if she should be done [place].    Charity Floyd PA-C

## 2021-04-23 ENCOUNTER — TELEPHONE (OUTPATIENT)
Dept: SLEEP MEDICINE | Facility: CLINIC | Age: 66
End: 2021-04-23

## 2021-04-25 ENCOUNTER — HEALTH MAINTENANCE LETTER (OUTPATIENT)
Age: 66
End: 2021-04-25

## 2021-04-28 ENCOUNTER — ANCILLARY PROCEDURE (OUTPATIENT)
Dept: CARDIOLOGY | Facility: CLINIC | Age: 66
End: 2021-04-28
Attending: CLINICAL NURSE SPECIALIST
Payer: COMMERCIAL

## 2021-04-28 PROBLEM — M41.50 DEGENERATIVE SCOLIOSIS IN ADULT PATIENT: Status: ACTIVE | Noted: 2021-04-28

## 2021-04-28 PROBLEM — M54.16 LUMBAR RADICULOPATHY: Status: ACTIVE | Noted: 2021-04-28

## 2021-04-28 PROCEDURE — 93306 TTE W/DOPPLER COMPLETE: CPT | Performed by: INTERNAL MEDICINE

## 2021-05-17 DIAGNOSIS — Z11.59 ENCOUNTER FOR SCREENING FOR OTHER VIRAL DISEASES: ICD-10-CM

## 2021-05-20 NOTE — TELEPHONE ENCOUNTER
FUTURE VISIT INFORMATION      SURGERY INFORMATION:    Date: 21    Location: ur or    Surgeon:  Gab León MD Jones, Kristen Elizabeth, MD    Anesthesia Type:  ov     Procedure: Thoracic 10 or 11 to Pelvis posterior instrumented fusion with Lumbar 1 or 2 to sacral 1  interbody fusion and lee stoddard osteotomy with O-Arm/Stealth Navigation, use of allograft, local autograft, and possible bone morphogenic protein, possible bedrock or crow enrollment    Consult:     RECORDS REQUESTED FROM:       Primary Care Provider: Ga Ken Jr., MD- [facility]    Pertinent Medical History: DAE. Pulmonary hypertension, venous insufficiency    Most recent EKG+ Tracin21    Most recent ECHO: 21    Most recent Sleep Study:  3/27/18

## 2021-05-21 ENCOUNTER — ANESTHESIA EVENT (OUTPATIENT)
Dept: SURGERY | Facility: CLINIC | Age: 66
End: 2021-05-21

## 2021-05-21 ENCOUNTER — PRE VISIT (OUTPATIENT)
Dept: SURGERY | Facility: CLINIC | Age: 66
End: 2021-05-21

## 2021-05-21 ENCOUNTER — VIRTUAL VISIT (OUTPATIENT)
Dept: SURGERY | Facility: CLINIC | Age: 66
End: 2021-05-21
Payer: COMMERCIAL

## 2021-05-21 DIAGNOSIS — Z01.818 PREOP EXAMINATION: Primary | ICD-10-CM

## 2021-05-21 DIAGNOSIS — M41.50 DEGENERATIVE SCOLIOSIS: ICD-10-CM

## 2021-05-21 PROCEDURE — 99214 OFFICE O/P EST MOD 30 MIN: CPT | Mod: 95 | Performed by: CLINICAL NURSE SPECIALIST

## 2021-05-21 RX ORDER — ACETAMINOPHEN 325 MG/1
975 TABLET ORAL ONCE
Status: CANCELLED | OUTPATIENT
Start: 2021-05-21 | End: 2021-05-21

## 2021-05-21 RX ORDER — MAGNESIUM SULFATE HEPTAHYDRATE 40 MG/ML
2 INJECTION, SOLUTION INTRAVENOUS ONCE
Status: CANCELLED | OUTPATIENT
Start: 2021-05-21 | End: 2021-05-21

## 2021-05-21 RX ORDER — PROPOFOL 10 MG/ML
25-150 INJECTION, EMULSION INTRAVENOUS CONTINUOUS
Status: CANCELLED | OUTPATIENT
Start: 2021-05-21

## 2021-05-21 ASSESSMENT — PAIN SCALES - GENERAL: PAINLEVEL: EXTREME PAIN (8)

## 2021-05-21 ASSESSMENT — LIFESTYLE VARIABLES: TOBACCO_USE: 1

## 2021-05-21 NOTE — ANESTHESIA PREPROCEDURE EVALUATION
Anesthesia Pre-Procedure Evaluation    Patient: Madyson Murray   MRN: [id number] : 1955        Preoperative Diagnosis: * No surgery found *   Procedure :      Past Medical History:   Diagnosis Date     Chronic bilateral low back pain without sciatica      Degenerative scoliosis      Lumbar radiculopathy      Obesity      DAE (obstructive sleep apnea)      Pulmonary hypertension (H)      Tricuspid regurgitation      Venous (peripheral) insufficiency       Past Surgical History:   Procedure Laterality Date     JOINT REPLACEMENT Right      JOINT REPLACEMENT Left      ORTHOPEDIC SURGERY       and , total knees     TUBAL LIGATION        Allergies   Allergen Reactions     Liquid Adhesive       Social History     Tobacco Use     Smoking status: Former Smoker     Packs/day: 1.00     Years: 15.00     Pack years: 15.00     Types: Cigarettes     Smokeless tobacco: Never Used     Tobacco comment: quit around age 32   Substance Use Topics     Alcohol use: Yes     Comment: 2-4 drinks couple times per week      Wt Readings from Last 1 Encounters:   21 115.7 kg (255 lb)        Anesthesia Evaluation   Pt has had prior anesthetic. Type: General.    No history of anesthetic complications       ROS/MED HX  ENT/Pulmonary:     (+) sleep apnea, uses CPAP, tobacco use, Past use,  (-) recent URI   Neurologic: Comment: Lumbar radiculopathy      Cardiovascular: Comment: Venous peripheral insufficiency   Pulmonary HYPERTENSION on earlier echocardiogram, repeat normal.      (+) hypertension-----valvular problems/murmurs mild TR. Previous cardiac testing   Echo: Date: 21 Results:    Stress Test: Date: Results:    ECG Reviewed: Date: 21 Results:  NSR  Cath: Date: Results:   (-) taking anticoagulants/antiplatelets and pulmonary hypertension   METS/Exercise Tolerance: 3 - Able to walk 1-2 blocks without stopping Comment: Prolonged standing at her work.   Hematologic:  - neg hematologic  ROS     Musculoskeletal:  Comment: Chronic back pain      GI/Hepatic:  - neg GI/hepatic ROS     Renal/Genitourinary:  - neg Renal ROS     Endo: Comment: Has lost 30#    (+) Obesity,     Psychiatric/Substance Use:  - neg psychiatric ROS     Infectious Disease:  - neg infectious disease ROS     Malignancy:  - neg malignancy ROS     Other:  - neg other ROS    (+) , H/O Chronic Pain,        Physical Exam    Airway   unable to assess          Respiratory Devices and Support         Dental       (+) upper dentures and partials      Cardiovascular    unable to assess         Pulmonary    Unable to assess           Other findings: Virtual visit    OUTSIDE LABS:  CBC: No results found for: WBC, HGB, HCT, PLT  BMP:   Lab Results   Component Value Date     01/06/2021     12/12/2019    POTASSIUM 4.3 01/06/2021    POTASSIUM 3.9 12/12/2019    CHLORIDE 107 01/06/2021    CHLORIDE 108 12/12/2019    CO2 28 01/06/2021    CO2 27 12/12/2019    BUN 13 01/06/2021    BUN 17 12/12/2019    CR 0.74 01/06/2021    CR 0.68 12/12/2019    GLC 91 01/06/2021    GLC 98 12/12/2019     COAGS: No results found for: PTT, INR, FIBR  POC: No results found for: BGM, HCG, HCGS  HEPATIC:   Lab Results   Component Value Date    ALBUMIN 3.9 03/05/2018    PROTTOTAL 7.6 03/05/2018    ALT 25 03/05/2018    AST 26 03/05/2018    ALKPHOS 88 03/05/2018    BILITOTAL 1.2 03/05/2018     OTHER:   Lab Results   Component Value Date    A1C 5.7 02/05/2018    STUART 9.1 01/06/2021    TSH 2.38 03/05/2018             PAC Discussion and Assessment    ASA Classification: 3  Case is suitable for: [place]  Anesthetic techniques and relevant risks discussed: GA  Invasive monitoring and risk discussed: No    Possibility and Risk of blood transfusion discussed: Yes            PAC Resident/NP Anesthesia Assessment: Madyson Murray is a 66 year old female scheduled to undergo Thoracic 10 or 11 to Pelvis posterior instrumented fusion with Lumbar 1 or 2 to sacral 1  interbody fusion and lee stoddard  osteotomy with O-Arm/Stealth Navigation, use of allograft, local autograft, and possible bone morphogenic protein, possible bedrock or crow enrollment with Manju León and Alfonso on 6/2/21. She has the following specific operative considerations:   - RCRI : No serious cardiac risks.    - VTE risk: 0.5%   - Risk of PONV score = 3.  If > 2, anti-emetic intervention recommended. If 3 or > anti emetic intervention recommended with two or more meds     --Chronic bilateral low back pain. Degenerative scoliosis. Above procedure now planned. Will hold meloxicam for 10 days prior to surgery.   --No personal history of problems with anesthesia. Reports brother had jaundice after a procedure on his leg in the 70s. Discussed with Anesthesia. Possible Halothane hepatitis. Patient reassured.   --Obesity. BMI 40. Has lost 30#. Truncal and central distribution. Airway feasible from last exam.  --HTN. Losartan and is closely followed by PCP for BP management. Will hold losartan on DOS. Pulmonary HTN (PA 35-45 mmHg) on echo in 2018. Possible DAE and poorly controlled HTN thought to be contributing at that time. Mild to moderate tricuspid regurgitation. Denies cardiac symptoms. Activity limited due to pain but working regularly and does prolonged standing. EKG NSR. Updated echocardiogram on 4/28/21 was improved with normal pulmonary artery pressure and trace to mild tricuspid regurgitation.   --Former 15 pack year smoking history. Quit 30 years ago. Denies pulmonary symptoms. DAE with regular use of CPAP and will bring on DOS.  --Denies history of blood transfusion. Type and screen to be drawn on 6/1/21.  --Optimal recovery pathway initiated.          Patient was discussed with Dr Gipson. Patient is optimized and is acceptable candidate for the proposed procedure.  No further diagnostic evaluation is needed.   Reviewed and Signed by PAC Mid-Level Provider/Resident  Mid-Level Provider/Resident: PB Glass, CNS  Date:  5/21/21  Time: 3:23pm                               PB Rae CNS

## 2021-05-21 NOTE — H&P
Pre-Operative H & P     CC:  Preoperative exam to assess for increased cardiopulmonary risk while undergoing surgery and anesthesia.    Date of Encounter: 2021  Primary Care Physician:  Ga Ken Jr.  Reason for visit: Degenerative scoliosis in adult patient [M41.80]  Lumbar radiculopathy [M54.16]  HPI  Madyson Murray is a 66 year old female who presents for pre-operative H & P in preparation for Thoracic 10 or 11 to Pelvis posterior instrumented fusion with Lumbar 1 or 2 to sacral 1  interbody fusion and lee stoddard osteotomy with O-Arm/Stealth Navigation, use of allograft, local autograft, and possible bone morphogenic protein, possible bedrock or crow enrollment with Manju León and Alfonso on 21 at [facility]. History is obtained from the patient and medical records.     At the beginning of this visit patient ID was confirmed using name and .     Video-Visit Details    Type of service:  Video Visit    Patient verbally consented to video service today: YES      Video Start Time: 2:58pm   Video End Time (time video stopped): 3:10pm    Originating Location (pt. Location): Home    Distant Location (provider location):  [facility]    Mode of Communication:  Video Conference via [employer]    Patient who has been following with Dr. León over the last several months for low back pain severely limiting her activity, present for approximately 5 years. Her attempts at conservative management have not had lasting success. She has had multiple rounds of physical therapy and had an epidural steroid injection with minimal benefit. Her imaging was reviewed by Dr. León with notes:   I reviewed her lumbar radiographs which show severe degenerative scoliosis, measuring approximately 30 degrees, apex to the left side.  She also has relative lumbar flat back and diffuse spondylosis.  Her MRI shows severe spondylosis.  She was  counseled for above procedures.     She was referred to the Preop Assessment Center on 1/29/21 for risk evaluation and optimization for surgery.      Her history is otherwise significant for obesity, HTN, pulmonary HTN, tricuspid regurgitation, and venous insufficiency. Her mild pulmonary HYPERTENSION was discovered with echocardiogram in 2018 completed for murmur. She was also found to have mild to moderate tricuspid regurgitation. She has had no Cardiology follow up since then. She did, however, have a sleep study, was diagnosed with DAE and is regularly using CPAP. She denies fever, cough, shortness of breath, chest pain or irregular HR. She reports some ankle edema. Her work involves prolonged standing so she is very fatigued by the end of her days.      For primary care she is followed by Dr. Ken who has been working to get her BP better controlled.     Due to concern for her previous findings of pulmonary HTN and mild to moderate tricuspid regurgitation she was sent for repeat echo on 4/28/21  Interpretation Summary  Pulmonary artery systolic pressure is normal.    Left Ventricle  Global and regional left ventricular function is normal with an EF of 55-60%.  Left ventricular size is normal. Mild concentric wall thickening consistent  with left ventricular hypertrophy is present. Basal septum 21mm with no ELISA of  mitral leaflet or LVOT gradient. Left ventricular diastolic function is  normal. No regional wall motion abnormalities are seen.     Tricuspid Valve  The tricuspid valve is normal. Trace to mild tricuspid insufficiency is  present. The right ventricular systolic pressure is approximated at 29.2 mmHg  plus the right atrial pressure. Pulmonary artery systolic pressure is normal.     The patient more recently returned to Dr. León on 4/13/21 for follow up. She had lost 30 pounds, but her symptoms remained unchanged with severe low back pain when attempting to stand. She feels like she is tipping  over to the side and that she cannot stand up straight.    She is now preparing for above procedure.    Past Medical History  Past Medical History:   Diagnosis Date     Chronic bilateral low back pain without sciatica      Degenerative scoliosis      Lumbar radiculopathy      Obesity      DAE (obstructive sleep apnea)      Pulmonary hypertension (H)     last echo normal     Tricuspid regurgitation      Venous (peripheral) insufficiency        Past Surgical History  Past Surgical History:   Procedure Laterality Date     JOINT REPLACEMENT Right      JOINT REPLACEMENT Left      ORTHOPEDIC SURGERY      2010 and 2012, total knees     TUBAL LIGATION         Hx of Blood transfusions/reactions: Denies.      Hx of abnormal bleeding or anti-platelet use: Denies.     Menstrual history: No LMP recorded. Patient is postmenopausal.    Steroid use in the last year: Denies.     Personal or FH with difficulty with Anesthesia:  No personal history of problems with anesthesia. Reports brother had jaundice after a procedure on his leg in the 70s. Discussed with Anesthesia. Possible Halothane hepatitis.     Prior to Admission Medications  Current Outpatient Medications   Medication Sig Dispense Refill     acetaminophen (TYLENOL) 500 MG tablet Take 2 tablets (1,000 mg) by mouth every 8 hours as needed for mild pain       losartan (COZAAR) 25 MG tablet Take 2 tablets (50 mg) by mouth daily (Patient taking differently: Take 50 mg by mouth every morning ) 90 tablet 1     meloxicam (MOBIC) 15 MG tablet Take 1 tablet (15 mg) by mouth daily Take with food (Patient taking differently: Take 15 mg by mouth as needed Take with food) 30 tablet 0       Allergies  Allergies   Allergen Reactions     Liquid Adhesive        Social History  Social History     Socioeconomic History     Marital status:      Spouse name: Not on file     Number of children: Not on file     Years of education: Not on file     Highest education level: Not on file    Occupational History     Not on file   Social Needs     Financial resource strain: Not on file     Food insecurity     Worry: Not on file     Inability: Not on file     Transportation needs     Medical: Not on file     Non-medical: Not on file   Tobacco Use     Smoking status: Former Smoker     Packs/day: 1.00     Years: 15.00     Pack years: 15.00     Types: Cigarettes     Smokeless tobacco: Never Used     Tobacco comment: quit around age 32   Substance and Sexual Activity     Alcohol use: Yes     Comment: 2-4 drinks couple times per week     Drug use: No     Sexual activity: Yes   Lifestyle     Physical activity     Days per week: Not on file     Minutes per session: Not on file     Stress: Not on file   Relationships     Social connections     Talks on phone: Not on file     Gets together: Not on file     Attends Yazidi service: Not on file     Active member of club or organization: Not on file     Attends meetings of clubs or organizations: Not on file     Relationship status: Not on file     Intimate partner violence     Fear of current or ex partner: Not on file     Emotionally abused: Not on file     Physically abused: Not on file     Forced sexual activity: Not on file   Other Topics Concern     Parent/sibling w/ CABG, MI or angioplasty before 65F 55M? Not Asked   Social History Narrative     Not on file       Family History  Family History   Problem Relation Age of Onset     Hypertension Mother      Diabetes Father      Hypertension Father      Parkinsonism Father      Diabetes Sister      Hypertension Sister      Sleep Apnea Sister      Sleep Apnea Brother      ROS/MED HISTORY  The complete review of systems is negative other than noted in the HPI or here.    ENT/Pulmonary:     (+) sleep apnea, uses CPAP, tobacco use, Past use,  (-) recent URI   Neurologic: Comment: Lumbar radiculopathy      Cardiovascular: Comment: Venous peripheral insufficiency   Pulmonary HYPERTENSION on earlier echocardiogram,  repeat normal.      (+) hypertension-----valvular problems/murmurs mild TR. Previous cardiac testing   Echo: Date: 4/28/21 Results:    Stress Test: Date: Results:    ECG Reviewed: Date: 1/29/21 Results:  NSR  Cath: Date: Results:   (-) taking anticoagulants/antiplatelets and pulmonary hypertension   METS/Exercise Tolerance: 3 - Able to walk 1-2 blocks without stopping Comment: Prolonged standing at her work.   Hematologic:  - neg hematologic  ROS     Musculoskeletal: Comment: Chronic back pain      GI/Hepatic:  - neg GI/hepatic ROS     Renal/Genitourinary:  - neg Renal ROS     Endo: Comment: Has lost 30#    (+) Obesity,     Psychiatric/Substance Use:  - neg psychiatric ROS     Infectious Disease:  - neg infectious disease ROS     Malignancy:  - neg malignancy ROS     Other:  - neg other ROS    (+) , H/O Chronic Pain,      Last labs personally reviewed    BMP:   Lab Results   Component Value Date     01/06/2021     12/12/2019    POTASSIUM 4.3 01/06/2021    POTASSIUM 3.9 12/12/2019    CHLORIDE 107 01/06/2021    CHLORIDE 108 12/12/2019    CO2 28 01/06/2021    CO2 27 12/12/2019    BUN 13 01/06/2021    BUN 17 12/12/2019    CR 0.74 01/06/2021    CR 0.68 12/12/2019    GLC 91 01/06/2021    GLC 98 12/12/2019     HEPATIC:   Lab Results   Component Value Date    ALBUMIN 3.9 03/05/2018    PROTTOTAL 7.6 03/05/2018    ALT 25 03/05/2018    AST 26 03/05/2018    ALKPHOS 88 03/05/2018    BILITOTAL 1.2 03/05/2018     OTHER:   Lab Results   Component Value Date    A1C 5.7 02/05/2018    STUART 9.1 01/06/2021    TSH 2.38 03/05/2018        Physical Exam  Constitutional: Awake, alert, no apparent distress, and appears stated age.  Respiratory: No cough or obvious dyspnea.  Neuropsychiatric: Calm, cooperative. Normal affect.     My last full exam on 1/29/21    Please refer to the physical examination documented by the anesthesiologist in the anesthesia record on the day of surgery    EKG: Personally reviewed 1/29/21 Normal sinus  rhythm  Cardiac echo: Last echocardiogram above from 4/28/21 1/28/21 Xray spine complete                                                      Impression:  1. Mild left convexed curvature of the thoracolumbar/lumbar spine with  apex at L3. Multilevel spondylosis of the spine, including at least  moderate disc space narrowing at L4-L5 and L5-S1 (although evaluation  for disc space narrowing in the lumbar spine is limited due to the  coronal curvature of the spine).  2. Positive global sagittal imbalance.   3. Positive coronal imbalance.  4. Weight bearing axis as detailed above.    1/28/21 CT lumbar spine                                                    Impression:   1. Leftward lumbar scoliotic curvature centered at L3 with associated  degenerative changes as described above. Moderate right L2-3 and  moderate left L5-S1 foraminal stenosis similar to prior MRI. No  significant spinal canal narrowing and lumbar spine.  2. No foraminal or canal stenosis in the thoracic spine.  1/14/21 MR lumbar spine                                                      Impression: Multilevel lumbar spondylosis without significant spinal  canal stenosis at any level. Moderate foraminal stenosis on the right  at L2-3 and on the left at L5-S1.    Imaging and cardiac testing reviewed by this provider    Outside records reviewed from: [facility] Everywhere    ASSESSMENT and PLAN  Madyson Murray is a 66 year old female scheduled to undergo Thoracic 10 or 11 to Pelvis posterior instrumented fusion with Lumbar 1 or 2 to sacral 1  interbody fusion and lee stoddard osteotomy with O-Arm/Stealth Navigation, use of allograft, local autograft, and possible bone morphogenic protein, possible bedrock or crow enrollment with Manju León and Alfonso on 6/2/21. She has the following specific operative considerations:   - RCRI : No serious cardiac risks.    - Anesthesia considerations:  Refer to PAC assessment in anesthesia records  - VTE risk: 0.5%   -  Risk of PONV score = 3.  If > 2, anti-emetic intervention recommended. If 3 or > anti emetic intervention recommended with two or more meds     --Chronic bilateral low back pain. Degenerative scoliosis. Above procedure now planned. Will hold meloxicam for 10 days prior to surgery.   --No personal history of problems with anesthesia. Reports brother had jaundice after a procedure on his leg in the 70s. Discussed with Anesthesia. Possible Halothane hepatitis. Patient reassured.   --Obesity. BMI 40. Has lost 30#. Truncal and central distribution. Airway feasible from last exam.  --HTN. Losartan and is closely followed by PCP for BP management. Will hold losartan on DOS. Pulmonary HTN (PA 35-45 mmHg) on echo in 2018. Possible DAE and poorly controlled HTN thought to be contributing at that time. Mild to moderate tricuspid regurgitation. Denies cardiac symptoms. Activity limited due to pain but working regularly and does prolonged standing. EKG NSR. Updated echocardiogram on 4/28/21 was improved with normal pulmonary artery pressure and trace to mild tricuspid regurgitation.   --Former 15 pack year smoking history. Quit 30 years ago. Denies pulmonary symptoms. DAE with regular use of CPAP and will bring on DOS.  --Denies history of blood transfusion. Type and screen to be drawn on 6/1/21.  --Optimal recovery pathway initiated.      Arrival time, NPO, shower and medication instructions provided by nursing staff today.        Patient was discussed with Dr Gipson. Patient is optimized and is acceptable candidate for the proposed procedure.  No further diagnostic evaluation is needed.         PB Rae CNS  Preoperative Assessment Center  [facility]  Phone: [phone]  Fax: [phone]

## 2021-05-21 NOTE — PATIENT INSTRUCTIONS
Preparing for Your Surgery      Name:  Madyson Murray   MRN:  [id number]   :  1955   Today's Date:  2021       Arriving for surgery:  Surgery date:  21  Arrival time:  5:30 am    Restrictions due to COVID 19:  One consistent visitor per patient is allowed.  The visitor will be allowed in the pre-op area.  Visitors are asked to leave the building during the surgery.  No ill visitors.  All visitors must wear face mask.    [employer] parking is available for anyone with mobility limitations or disabilities.  ([employer]  24 hours/ 7 days a week; [employer]  7 am- 3:30 pm, Mon- Fri)    Please come to:     [facility] [address]  [address] [place], [place] [address]. [place]  [address]    -Proceed to the 3rd floor, check in at the Adult Surgery Waiting Lounge. [phone]    If an escort is needed stop at the Information Desk in the lobby. Inform the information person that you are here for surgery. An escort to the Adult Surgery Waiting Lounge will be provided.    What can I eat or drink?    OPTIMAL RECOVERY AFTER ORTHOPEDIC SURGERY   SPINE SURGERY      Begin hydrating yourself by drinking at least 8-10 glasses of clear liquids for 24 hours before surgery:      Suggested clear liquids:   Water    Clear Juices   Clear Broth   Non- carbonated beverages    (Crystal Light or Lorenzo Aid)   Sodas    (Sprite, 7 up, ginger ale, seltzer)   Gatorade          We would like you to purchase a drink such as Gatorade or Ensure Clear (not the milkshake type).  Drink this before bedtime and on the way into the hospital, drink between 8-10 ounces or until you feel hydrated.        Keeping well hydrated leads to your veins being plump, you wake up faster, and you are less likely to be nauseated. Start drinking water as soon as you can after surgery and advance to clear liquids and food as tolerated.  IV fluids contain salt, drinking fluids will minimize the amount of IV fluids you need and  decrease the amount of salt you get.                 The most common reason for the patient to be readmitted is dehydration. Staying hydrated after you go home from the hospital is very important.  Ensure or Ensure Clear are good options to keep you hydrated.      -  You may eat and drink normally for up to 8 hours before your surgery. (Until 11:30 pm 6-1-21)  -  You may have clear liquids until 2 hours before surgery. (Until 5:30 am)    Examples of clear liquids:  Water  Clear broth  Juices (apple, white grape, white cranberry  and cider) without pulp  Noncarbonated, powder based beverages  (lemonade and Lorenzo-Aid)  Sodas (Sprite, 7-Up, ginger ale and seltzer)  Coffee or tea (without milk or cream)  Gatorade    -  No Alcohol for at least 24 hours before surgery     Which medicines can I take?  Hold Aspirin for 7 days before surgery.   Hold Multivitamins for 7 days before surgery.  Hold Supplements for 7 days before surgery.  Hold Ibuprofen (Advil, Motrin) for 1 day before surgery--unless otherwise directed by surgeon.  Hold Naproxen (Aleve) for 4 days before surgery.    Hold Meloxicam (Mobic) for 10 days prior to surgery. Take last dose of Meloxicam (Mobic) if needed on 5-22-21 and hold until surgery.    -  DO NOT take these medications the day of surgery:  Losartan (Cozaar),     -  PLEASE TAKE these medications the day of surgery:  Acetaminophen (Tylenol) if needed    How do I prepare myself?  -  Bring CPAP.  - Please take 2 showers before surgery using Scrubcare or Hibiclens soap.    Use this soap only from the neck to your toes.     Leave the soap on your skin for one minute--then rinse thoroughly.      You may use your own shampoo and conditioner; no other hair products.   - Please remove all jewelry and body piercings.  - No lotions, deodorants or fragrance.  - No makeup or fingernail polish.   - Bring your ID and insurance card.    - All patients are required to have a Covid-19 test within 4 days of  surgery/procedure.      -Patients will be contacted by the [facility] scheduling team within 1 week of surgery to make an appointment.      - Patients may call the Scheduling team at [phone] if they have not been scheduled within 4 days of  surgery.      ALL PATIENTS GOING HOME THE SAME DAY OF SURGERY ARE REQUIRED TO HAVE A RESPONSIBLE ADULT TO DRIVE AND BE IN ATTENDANCE WITH THEM FOR 24 HOURS FOLLOWING SURGERY.    IF THE RESPONSIBLE ADULT IS REQUIRED FOR POST OP TEACHING THE POST OP RN WILL ASK THEM TO COME BACK TO THE RECOVERY AREA.    Questions or Concerns:    - For any questions regarding the day of surgery or your hospital stay, please contact the Pre Admission Nursing Office at [phone].       - If you have health changes between today and your surgery please call your surgeon.       For questions after surgery please call your surgeons office.     AFTER YOUR SURGERY  Breathing exercises   Breathing exercises help you recover faster. Take deep breaths and let the air out slowly. This will:     Help you wake up after surgery.    Help prevent complications like pneumonia.  Preventing complications will help you go home sooner.   We may give you a breathing device (incentive spirometer) to encourage you to breathe deeply.   Nausea and vomiting   You may feel sick to your stomach after surgery; if so, let your nurse know.    Pain control:  After surgery, you may have pain. Our goal is to help you manage your pain. Pain medicine will help you feel comfortable enough to do activities that will help you heal.  These activities may include breathing exercises, walking and physical therapy.   To help your health care team treat your pain we will ask: 1) If you have pain  2) where it is located 3) describe your pain in your words  Methods of pain control include medications given by mouth, vein or by nerve block for some surgeries.  Sequential Compression Device (SCD):  You may need to wear SCD S (also  called pneumo boots)on your legs or feet. These are wraps connected to a machine that pumps in air and releases it. The repeated pumping helps prevent blood clots from forming.

## 2021-05-21 NOTE — PROGRESS NOTES
Madyson is a 66 year old who is being evaluated via a billable video visit.      How would you like to obtain your AVS? MyChart    HPI         Review of Systems         Objective    Vitals - Patient Reported  Pain Score: Extreme Pain (8)        Physical Exam     YAMILKA Worrell LPN

## 2021-05-25 DIAGNOSIS — I10 HYPERTENSION GOAL BP (BLOOD PRESSURE) < 140/90: ICD-10-CM

## 2021-05-25 DIAGNOSIS — G89.29 CHRONIC BILATERAL LOW BACK PAIN WITHOUT SCIATICA: ICD-10-CM

## 2021-05-25 DIAGNOSIS — M51.369 DEGENERATIVE DISC DISEASE, LUMBAR: ICD-10-CM

## 2021-05-25 DIAGNOSIS — M54.50 CHRONIC BILATERAL LOW BACK PAIN WITHOUT SCIATICA: ICD-10-CM

## 2021-05-25 NOTE — TELEPHONE ENCOUNTER
Routing refill request to provider for review/approval because:  Labs out of range:  bp  Silvio Biggs RN, BSN

## 2021-05-26 RX ORDER — LOSARTAN POTASSIUM 25 MG/1
50 TABLET ORAL DAILY
Qty: 90 TABLET | Refills: 1 | Status: SHIPPED | OUTPATIENT
Start: 2021-05-26 | End: 2021-08-18

## 2021-05-26 RX ORDER — MELOXICAM 15 MG/1
15 TABLET ORAL DAILY PRN
Status: ON HOLD
Start: 2021-05-26 | End: 2021-06-07

## 2021-05-26 NOTE — TELEPHONE ENCOUNTER
Chart reviewed.  Rx sent to pt's preferred pharmacy.       [id number] - [place], [place] - [address] [id number] - [place] - [address] [facility] [place] - [address] [facility] - [place], [place] - [address]  [id number] - [place], [place] - [address] MACY Ken MD

## 2021-06-01 ENCOUNTER — ANESTHESIA EVENT (OUTPATIENT)
Dept: SURGERY | Facility: CLINIC | Age: 66
DRG: 454 | End: 2021-06-01
Payer: COMMERCIAL

## 2021-06-01 ENCOUNTER — APPOINTMENT (OUTPATIENT)
Dept: LAB | Facility: CLINIC | Age: 66
End: 2021-06-01
Payer: COMMERCIAL

## 2021-06-01 DIAGNOSIS — M41.50 DEGENERATIVE SCOLIOSIS: ICD-10-CM

## 2021-06-01 DIAGNOSIS — Z11.59 ENCOUNTER FOR SCREENING FOR OTHER VIRAL DISEASES: ICD-10-CM

## 2021-06-01 LAB
ALBUMIN SERPL-MCNC: 3.6 G/DL (ref 3.4–5)
ALP SERPL-CCNC: 107 U/L (ref 40–150)
ALT SERPL W P-5'-P-CCNC: 26 U/L (ref 0–50)
ANION GAP SERPL CALCULATED.3IONS-SCNC: 5 MMOL/L (ref 3–14)
AST SERPL W P-5'-P-CCNC: 22 U/L (ref 0–45)
BILIRUB SERPL-MCNC: 0.6 MG/DL (ref 0.2–1.3)
BUN SERPL-MCNC: 22 MG/DL (ref 7–30)
CALCIUM SERPL-MCNC: 9.3 MG/DL (ref 8.5–10.1)
CHLORIDE SERPL-SCNC: 109 MMOL/L (ref 94–109)
CO2 SERPL-SCNC: 27 MMOL/L (ref 20–32)
CREAT SERPL-MCNC: 0.69 MG/DL (ref 0.52–1.04)
ERYTHROCYTE [DISTWIDTH] IN BLOOD BY AUTOMATED COUNT: 13.2 % (ref 10–15)
GFR SERPL CREATININE-BSD FRML MDRD: >90 ML/MIN/{1.73_M2}
GLUCOSE SERPL-MCNC: 102 MG/DL (ref 70–99)
HCT VFR BLD AUTO: 41.7 % (ref 35–47)
HGB BLD-MCNC: 13.1 G/DL (ref 11.7–15.7)
INR PPP: 0.96 (ref 0.86–1.14)
LABORATORY COMMENT REPORT: NORMAL
MCH RBC QN AUTO: 28.6 PG (ref 26.5–33)
MCHC RBC AUTO-ENTMCNC: 31.4 G/DL (ref 31.5–36.5)
MCV RBC AUTO: 91 FL (ref 78–100)
PLATELET # BLD AUTO: 197 10E9/L (ref 150–450)
POTASSIUM SERPL-SCNC: 4.8 MMOL/L (ref 3.4–5.3)
PROT SERPL-MCNC: 7.2 G/DL (ref 6.8–8.8)
RBC # BLD AUTO: 4.58 10E12/L (ref 3.8–5.2)
SARS-COV-2 RNA RESP QL NAA+PROBE: NEGATIVE
SARS-COV-2 RNA RESP QL NAA+PROBE: NORMAL
SODIUM SERPL-SCNC: 140 MMOL/L (ref 133–144)
SPECIMEN SOURCE: NORMAL
SPECIMEN SOURCE: NORMAL
WBC # BLD AUTO: 5 10E9/L (ref 4–11)

## 2021-06-01 PROCEDURE — 85610 PROTHROMBIN TIME: CPT | Performed by: PATHOLOGY

## 2021-06-01 PROCEDURE — 80053 COMPREHEN METABOLIC PANEL: CPT | Performed by: PATHOLOGY

## 2021-06-01 PROCEDURE — U0003 INFECTIOUS AGENT DETECTION BY NUCLEIC ACID (DNA OR RNA); SEVERE ACUTE RESPIRATORY SYNDROME CORONAVIRUS 2 (SARS-COV-2) (CORONAVIRUS DISEASE [COVID-19]), AMPLIFIED PROBE TECHNIQUE, MAKING USE OF HIGH THROUGHPUT TECHNOLOGIES AS DESCRIBED BY CMS-2020-01-R: HCPCS | Performed by: PATHOLOGY

## 2021-06-01 PROCEDURE — 86850 RBC ANTIBODY SCREEN: CPT | Performed by: PATHOLOGY

## 2021-06-01 PROCEDURE — U0005 INFEC AGEN DETEC AMPLI PROBE: HCPCS | Performed by: PATHOLOGY

## 2021-06-01 PROCEDURE — 86901 BLOOD TYPING SEROLOGIC RH(D): CPT | Performed by: PATHOLOGY

## 2021-06-01 PROCEDURE — 85027 COMPLETE CBC AUTOMATED: CPT | Performed by: PATHOLOGY

## 2021-06-01 PROCEDURE — 36415 COLL VENOUS BLD VENIPUNCTURE: CPT | Performed by: PATHOLOGY

## 2021-06-01 PROCEDURE — 86900 BLOOD TYPING SEROLOGIC ABO: CPT | Performed by: PATHOLOGY

## 2021-06-01 PROCEDURE — 86923 COMPATIBILITY TEST ELECTRIC: CPT | Performed by: PATHOLOGY

## 2021-06-01 ASSESSMENT — LIFESTYLE VARIABLES: TOBACCO_USE: 1

## 2021-06-02 ENCOUNTER — APPOINTMENT (OUTPATIENT)
Dept: GENERAL RADIOLOGY | Facility: CLINIC | Age: 66
DRG: 454 | End: 2021-06-02
Attending: ORTHOPAEDIC SURGERY
Payer: COMMERCIAL

## 2021-06-02 ENCOUNTER — HOSPITAL ENCOUNTER (INPATIENT)
Facility: CLINIC | Age: 66
LOS: 5 days | Discharge: HOME | DRG: 454 | End: 2021-06-07
Attending: ORTHOPAEDIC SURGERY | Admitting: ORTHOPAEDIC SURGERY
Payer: COMMERCIAL

## 2021-06-02 ENCOUNTER — ANESTHESIA (OUTPATIENT)
Dept: SURGERY | Facility: CLINIC | Age: 66
DRG: 454 | End: 2021-06-02
Payer: COMMERCIAL

## 2021-06-02 DIAGNOSIS — M54.16 LUMBAR RADICULOPATHY: ICD-10-CM

## 2021-06-02 DIAGNOSIS — M41.50 DEGENERATIVE SCOLIOSIS IN ADULT PATIENT: ICD-10-CM

## 2021-06-02 LAB
ABO + RH BLD: NORMAL
ABO + RH BLD: NORMAL
APTT PPP: 26 SEC (ref 22–37)
BASE DEFICIT BLDA-SCNC: 0.2 MMOL/L
BASE DEFICIT BLDA-SCNC: 0.6 MMOL/L
BASE DEFICIT BLDA-SCNC: 1 MMOL/L
BASE DEFICIT BLDA-SCNC: 1.6 MMOL/L
BASE EXCESS BLDA CALC-SCNC: 0.3 MMOL/L
BASE EXCESS BLDA CALC-SCNC: 0.5 MMOL/L
BLD GP AB SCN SERPL QL: NORMAL
BLD PROD TYP BPU: NORMAL
BLD UNIT ID BPU: 0
BLD UNIT ID BPU: 0
BLOOD BANK CMNT PATIENT-IMP: NORMAL
BLOOD PRODUCT CODE: NORMAL
BLOOD PRODUCT CODE: NORMAL
BPU ID: NORMAL
BPU ID: NORMAL
CA-I BLD-MCNC: 4.3 MG/DL (ref 4.4–5.2)
CA-I BLD-MCNC: 4.4 MG/DL (ref 4.4–5.2)
CA-I BLD-MCNC: 4.5 MG/DL (ref 4.4–5.2)
CA-I BLD-MCNC: 4.5 MG/DL (ref 4.4–5.2)
CA-I BLD-MCNC: 4.6 MG/DL (ref 4.4–5.2)
CA-I BLD-MCNC: 4.8 MG/DL (ref 4.4–5.2)
ERYTHROCYTE [DISTWIDTH] IN BLOOD BY AUTOMATED COUNT: 13.2 % (ref 10–15)
FIBRINOGEN PPP-MCNC: 214 MG/DL (ref 200–420)
GLUCOSE BLD-MCNC: 128 MG/DL (ref 70–99)
GLUCOSE BLD-MCNC: 130 MG/DL (ref 70–99)
GLUCOSE BLD-MCNC: 152 MG/DL (ref 70–99)
GLUCOSE BLD-MCNC: 170 MG/DL (ref 70–99)
GLUCOSE BLD-MCNC: 171 MG/DL (ref 70–99)
GLUCOSE BLD-MCNC: 183 MG/DL (ref 70–99)
GLUCOSE BLDC GLUCOMTR-MCNC: 127 MG/DL (ref 70–99)
GLUCOSE BLDC GLUCOMTR-MCNC: 163 MG/DL (ref 70–99)
GLUCOSE BLDC GLUCOMTR-MCNC: 170 MG/DL (ref 70–99)
GLUCOSE BLDC GLUCOMTR-MCNC: 96 MG/DL (ref 70–99)
HCO3 BLD-SCNC: 23 MMOL/L (ref 21–28)
HCO3 BLD-SCNC: 24 MMOL/L (ref 21–28)
HCO3 BLD-SCNC: 25 MMOL/L (ref 21–28)
HCO3 BLD-SCNC: 26 MMOL/L (ref 21–28)
HCT VFR BLD AUTO: 32.3 % (ref 35–47)
HCT VFR BLD AUTO: 32.9 % (ref 35–47)
HCT VFR BLD AUTO: 33.9 % (ref 35–47)
HGB BLD-MCNC: 10 G/DL (ref 11.7–15.7)
HGB BLD-MCNC: 10.4 G/DL (ref 11.7–15.7)
HGB BLD-MCNC: 10.7 G/DL (ref 11.7–15.7)
HGB BLD-MCNC: 10.8 G/DL (ref 11.7–15.7)
HGB BLD-MCNC: 10.9 G/DL (ref 11.7–15.7)
HGB BLD-MCNC: 11 G/DL (ref 11.7–15.7)
HGB BLD-MCNC: 11.9 G/DL (ref 11.7–15.7)
INR PPP: 1.17 (ref 0.86–1.14)
LACTATE BLD-SCNC: 0.8 MMOL/L (ref 0.7–2)
LACTATE BLD-SCNC: 1 MMOL/L (ref 0.7–2)
MCH RBC QN AUTO: 29.2 PG (ref 26.5–33)
MCHC RBC AUTO-ENTMCNC: 32.5 G/DL (ref 31.5–36.5)
MCV RBC AUTO: 90 FL (ref 78–100)
NUM BPU REQUESTED: 2
O2/TOTAL GAS SETTING VFR VENT: 29 %
O2/TOTAL GAS SETTING VFR VENT: 30 %
O2/TOTAL GAS SETTING VFR VENT: 30 %
O2/TOTAL GAS SETTING VFR VENT: 31 %
O2/TOTAL GAS SETTING VFR VENT: ABNORMAL %
O2/TOTAL GAS SETTING VFR VENT: ABNORMAL %
PCO2 BLD: 38 MM HG (ref 35–45)
PCO2 BLD: 38 MM HG (ref 35–45)
PCO2 BLD: 39 MM HG (ref 35–45)
PCO2 BLD: 39 MM HG (ref 35–45)
PCO2 BLD: 40 MM HG (ref 35–45)
PCO2 BLD: 42 MM HG (ref 35–45)
PH BLD: 7.39 PH (ref 7.35–7.45)
PH BLD: 7.41 PH (ref 7.35–7.45)
PLATELET # BLD AUTO: 161 10E9/L (ref 150–450)
PLATELET # BLD AUTO: 225 10E9/L (ref 150–450)
PO2 BLD: 126 MM HG (ref 80–105)
PO2 BLD: 132 MM HG (ref 80–105)
PO2 BLD: 132 MM HG (ref 80–105)
PO2 BLD: 136 MM HG (ref 80–105)
PO2 BLD: 152 MM HG (ref 80–105)
PO2 BLD: 162 MM HG (ref 80–105)
POTASSIUM BLD-SCNC: 4.1 MMOL/L (ref 3.4–5.3)
POTASSIUM BLD-SCNC: 4.1 MMOL/L (ref 3.4–5.3)
POTASSIUM BLD-SCNC: 4.2 MMOL/L (ref 3.4–5.3)
POTASSIUM BLD-SCNC: 4.3 MMOL/L (ref 3.4–5.3)
POTASSIUM BLD-SCNC: 4.5 MMOL/L (ref 3.4–5.3)
POTASSIUM BLD-SCNC: 4.5 MMOL/L (ref 3.4–5.3)
RBC # BLD AUTO: 3.66 10E12/L (ref 3.8–5.2)
SODIUM BLD-SCNC: 136 MMOL/L (ref 133–144)
SODIUM BLD-SCNC: 136 MMOL/L (ref 133–144)
SODIUM BLD-SCNC: 138 MMOL/L (ref 133–144)
SODIUM BLD-SCNC: 138 MMOL/L (ref 133–144)
SODIUM BLD-SCNC: 139 MMOL/L (ref 133–144)
SODIUM BLD-SCNC: 139 MMOL/L (ref 133–144)
SPECIMEN EXP DATE BLD: NORMAL
TRANSFUSION STATUS PATIENT QL: NORMAL
WBC # BLD AUTO: 14.7 10E9/L (ref 4–11)

## 2021-06-02 PROCEDURE — L8699 PROSTHETIC IMPLANT NOS: HCPCS | Performed by: ORTHOPAEDIC SURGERY

## 2021-06-02 PROCEDURE — 0SB40ZZ EXCISION OF LUMBOSACRAL DISC, OPEN APPROACH: ICD-10-PCS | Performed by: NEUROLOGICAL SURGERY

## 2021-06-02 PROCEDURE — 82947 ASSAY GLUCOSE BLOOD QUANT: CPT | Performed by: ORTHOPAEDIC SURGERY

## 2021-06-02 PROCEDURE — 85730 THROMBOPLASTIN TIME PARTIAL: CPT | Performed by: ORTHOPAEDIC SURGERY

## 2021-06-02 PROCEDURE — 258N000003 HC RX IP 258 OP 636: Performed by: ANESTHESIOLOGY

## 2021-06-02 PROCEDURE — 85027 COMPLETE CBC AUTOMATED: CPT | Performed by: STUDENT IN AN ORGANIZED HEALTH CARE EDUCATION/TRAINING PROGRAM

## 2021-06-02 PROCEDURE — 250N000011 HC RX IP 250 OP 636: Performed by: ORTHOPAEDIC SURGERY

## 2021-06-02 PROCEDURE — 82803 BLOOD GASES ANY COMBINATION: CPT | Performed by: ORTHOPAEDIC SURGERY

## 2021-06-02 PROCEDURE — 99207 PR CONSULT E&M CHANGED TO SUBSEQUENT LEVEL: CPT | Performed by: INTERNAL MEDICINE

## 2021-06-02 PROCEDURE — 0QH304Z INSERTION OF INTERNAL FIXATION DEVICE INTO LEFT PELVIC BONE, OPEN APPROACH: ICD-10-PCS | Performed by: NEUROLOGICAL SURGERY

## 2021-06-02 PROCEDURE — 250N000009 HC RX 250: Performed by: PHYSICIAN ASSISTANT

## 2021-06-02 PROCEDURE — 0SG3071 FUSION OF LUMBOSACRAL JOINT WITH AUTOLOGOUS TISSUE SUBSTITUTE, POSTERIOR APPROACH, POSTERIOR COLUMN, OPEN APPROACH: ICD-10-PCS | Performed by: ORTHOPAEDIC SURGERY

## 2021-06-02 PROCEDURE — 250N000025 HC SEVOFLURANE, PER MIN: Performed by: ORTHOPAEDIC SURGERY

## 2021-06-02 PROCEDURE — 0SG10AJ FUSION OF 2 OR MORE LUMBAR VERTEBRAL JOINTS WITH INTERBODY FUSION DEVICE, POSTERIOR APPROACH, ANTERIOR COLUMN, OPEN APPROACH: ICD-10-PCS | Performed by: NEUROLOGICAL SURGERY

## 2021-06-02 PROCEDURE — 99232 SBSQ HOSP IP/OBS MODERATE 35: CPT | Performed by: INTERNAL MEDICINE

## 2021-06-02 PROCEDURE — 272N000004 HC RX 272: Performed by: ORTHOPAEDIC SURGERY

## 2021-06-02 PROCEDURE — 120N000002 HC R&B MED SURG/OB UMMC

## 2021-06-02 PROCEDURE — 84132 ASSAY OF SERUM POTASSIUM: CPT | Performed by: ORTHOPAEDIC SURGERY

## 2021-06-02 PROCEDURE — 360N000086 HC SURGERY LEVEL 6 W/ FLUORO, PER MIN: Performed by: ORTHOPAEDIC SURGERY

## 2021-06-02 PROCEDURE — 370N000017 HC ANESTHESIA TECHNICAL FEE, PER MIN: Performed by: ORTHOPAEDIC SURGERY

## 2021-06-02 PROCEDURE — 0QS004Z REPOSITION LUMBAR VERTEBRA WITH INTERNAL FIXATION DEVICE, OPEN APPROACH: ICD-10-PCS | Performed by: NEUROLOGICAL SURGERY

## 2021-06-02 PROCEDURE — 999N001017 HC STATISTIC GLUCOSE BY METER IP

## 2021-06-02 PROCEDURE — 0SG30AJ FUSION OF LUMBOSACRAL JOINT WITH INTERBODY FUSION DEVICE, POSTERIOR APPROACH, ANTERIOR COLUMN, OPEN APPROACH: ICD-10-PCS | Performed by: NEUROLOGICAL SURGERY

## 2021-06-02 PROCEDURE — 250N000011 HC RX IP 250 OP 636: Performed by: NURSE ANESTHETIST, CERTIFIED REGISTERED

## 2021-06-02 PROCEDURE — 250N000009 HC RX 250: Performed by: NURSE ANESTHETIST, CERTIFIED REGISTERED

## 2021-06-02 PROCEDURE — 85014 HEMATOCRIT: CPT | Performed by: ORTHOPAEDIC SURGERY

## 2021-06-02 PROCEDURE — 258N000003 HC RX IP 258 OP 636: Performed by: NURSE ANESTHETIST, CERTIFIED REGISTERED

## 2021-06-02 PROCEDURE — 258N000003 HC RX IP 258 OP 636: Performed by: STUDENT IN AN ORGANIZED HEALTH CARE EDUCATION/TRAINING PROGRAM

## 2021-06-02 PROCEDURE — 0QH204Z INSERTION OF INTERNAL FIXATION DEVICE INTO RIGHT PELVIC BONE, OPEN APPROACH: ICD-10-PCS | Performed by: NEUROLOGICAL SURGERY

## 2021-06-02 PROCEDURE — C1713 ANCHOR/SCREW BN/BN,TIS/BN: HCPCS | Performed by: ORTHOPAEDIC SURGERY

## 2021-06-02 PROCEDURE — 250N000011 HC RX IP 250 OP 636: Performed by: ANESTHESIOLOGY

## 2021-06-02 PROCEDURE — 0RGA071 FUSION OF THORACOLUMBAR VERTEBRAL JOINT WITH AUTOLOGOUS TISSUE SUBSTITUTE, POSTERIOR APPROACH, POSTERIOR COLUMN, OPEN APPROACH: ICD-10-PCS | Performed by: ORTHOPAEDIC SURGERY

## 2021-06-02 PROCEDURE — 00QT0ZZ REPAIR SPINAL MENINGES, OPEN APPROACH: ICD-10-PCS | Performed by: NEUROLOGICAL SURGERY

## 2021-06-02 PROCEDURE — 999N000180 XR SURGERY CARM FLUORO LESS THAN 5 MIN: Mod: TC

## 2021-06-02 PROCEDURE — 250N000013 HC RX MED GY IP 250 OP 250 PS 637: Performed by: STUDENT IN AN ORGANIZED HEALTH CARE EDUCATION/TRAINING PROGRAM

## 2021-06-02 PROCEDURE — 0SB20ZZ EXCISION OF LUMBAR VERTEBRAL DISC, OPEN APPROACH: ICD-10-PCS | Performed by: NEUROLOGICAL SURGERY

## 2021-06-02 PROCEDURE — P9041 ALBUMIN (HUMAN),5%, 50ML: HCPCS | Performed by: NURSE ANESTHETIST, CERTIFIED REGISTERED

## 2021-06-02 PROCEDURE — 710N000010 HC RECOVERY PHASE 1, LEVEL 2, PER MIN: Performed by: ORTHOPAEDIC SURGERY

## 2021-06-02 PROCEDURE — 250N000011 HC RX IP 250 OP 636: Performed by: PHYSICIAN ASSISTANT

## 2021-06-02 PROCEDURE — 0SG1071 FUSION OF 2 OR MORE LUMBAR VERTEBRAL JOINTS WITH AUTOLOGOUS TISSUE SUBSTITUTE, POSTERIOR APPROACH, POSTERIOR COLUMN, OPEN APPROACH: ICD-10-PCS | Performed by: ORTHOPAEDIC SURGERY

## 2021-06-02 PROCEDURE — 250N000013 HC RX MED GY IP 250 OP 250 PS 637: Performed by: PHYSICIAN ASSISTANT

## 2021-06-02 PROCEDURE — 85049 AUTOMATED PLATELET COUNT: CPT | Performed by: ORTHOPAEDIC SURGERY

## 2021-06-02 PROCEDURE — 0RG6071 FUSION OF THORACIC VERTEBRAL JOINT WITH AUTOLOGOUS TISSUE SUBSTITUTE, POSTERIOR APPROACH, POSTERIOR COLUMN, OPEN APPROACH: ICD-10-PCS | Performed by: ORTHOPAEDIC SURGERY

## 2021-06-02 PROCEDURE — 84295 ASSAY OF SERUM SODIUM: CPT | Performed by: ORTHOPAEDIC SURGERY

## 2021-06-02 PROCEDURE — 83605 ASSAY OF LACTIC ACID: CPT | Performed by: ORTHOPAEDIC SURGERY

## 2021-06-02 PROCEDURE — 82330 ASSAY OF CALCIUM: CPT | Performed by: ORTHOPAEDIC SURGERY

## 2021-06-02 PROCEDURE — 258N000003 HC RX IP 258 OP 636: Performed by: PHYSICIAN ASSISTANT

## 2021-06-02 PROCEDURE — 272N000001 HC OR GENERAL SUPPLY STERILE: Performed by: ORTHOPAEDIC SURGERY

## 2021-06-02 PROCEDURE — 85384 FIBRINOGEN ACTIVITY: CPT | Performed by: ORTHOPAEDIC SURGERY

## 2021-06-02 PROCEDURE — 85396 CLOTTING ASSAY WHOLE BLOOD: CPT | Performed by: ORTHOPAEDIC SURGERY

## 2021-06-02 PROCEDURE — C1762 CONN TISS, HUMAN(INC FASCIA): HCPCS | Performed by: ORTHOPAEDIC SURGERY

## 2021-06-02 PROCEDURE — 8E0WXBF COMPUTER ASSISTED PROCEDURE OF TRUNK REGION, WITH FLUOROSCOPY: ICD-10-PCS | Performed by: NEUROLOGICAL SURGERY

## 2021-06-02 PROCEDURE — 85610 PROTHROMBIN TIME: CPT | Performed by: ORTHOPAEDIC SURGERY

## 2021-06-02 PROCEDURE — 250N000009 HC RX 250: Performed by: ORTHOPAEDIC SURGERY

## 2021-06-02 PROCEDURE — 250N000013 HC RX MED GY IP 250 OP 250 PS 637: Performed by: ANESTHESIOLOGY

## 2021-06-02 PROCEDURE — 999N000141 HC STATISTIC PRE-PROCEDURE NURSING ASSESSMENT: Performed by: ORTHOPAEDIC SURGERY

## 2021-06-02 DEVICE — IMP SCR MEDT 5.5/6.0MM SOLERA 6.5X55MM MA 55840006555: Type: IMPLANTABLE DEVICE | Site: SPINE LUMBAR | Status: FUNCTIONAL

## 2021-06-02 DEVICE — GRAFT BONE INFUSE BMP LG 7510600: Type: IMPLANTABLE DEVICE | Site: SPINE LUMBAR | Status: FUNCTIONAL

## 2021-06-02 DEVICE — IMP SCR MEDT 5.5/6.0MM SOLERA 7.5X40MM MA 55840007540: Type: IMPLANTABLE DEVICE | Site: SPINE LUMBAR | Status: FUNCTIONAL

## 2021-06-02 DEVICE — IMP SCR MEDT 5.5/6.0MM SOLERA 6.5X45MM MA 55840006545: Type: IMPLANTABLE DEVICE | Site: SPINE LUMBAR | Status: FUNCTIONAL

## 2021-06-02 DEVICE — IMP ROD MEDT SOLERA LINED 5.5X500MM CHR 1555200500: Type: IMPLANTABLE DEVICE | Site: SPINE LUMBAR | Status: FUNCTIONAL

## 2021-06-02 DEVICE — IMP SCR MEDT 5.5/6.0MM SOLERA 6.5X50MM MA 55840006550: Type: IMPLANTABLE DEVICE | Site: SPINE LUMBAR | Status: FUNCTIONAL

## 2021-06-02 DEVICE — GRAFT BONE CRUSH CANC 30ML 400080: Type: IMPLANTABLE DEVICE | Site: SPINE LUMBAR | Status: FUNCTIONAL

## 2021-06-02 DEVICE — SCREW 55720006550 5.5/6.0 DRMAS 6.5X50
Type: IMPLANTABLE DEVICE | Site: SPINE LUMBAR | Status: FUNCTIONAL
Brand: CD HORIZON® SOLERA® SPINAL SYSTEM

## 2021-06-02 DEVICE — IMP SCR MEDT 5.5/6.0MM SOLERA 7.5X50MM MA 55840007550: Type: IMPLANTABLE DEVICE | Site: SPINE LUMBAR | Status: FUNCTIONAL

## 2021-06-02 RX ORDER — ACETAMINOPHEN 325 MG/1
650 TABLET ORAL EVERY 4 HOURS PRN
Status: DISCONTINUED | OUTPATIENT
Start: 2021-06-05 | End: 2021-06-07 | Stop reason: HOSPADM

## 2021-06-02 RX ORDER — NALOXONE HYDROCHLORIDE 0.4 MG/ML
0.2 INJECTION, SOLUTION INTRAMUSCULAR; INTRAVENOUS; SUBCUTANEOUS
Status: DISCONTINUED | OUTPATIENT
Start: 2021-06-02 | End: 2021-06-07 | Stop reason: HOSPADM

## 2021-06-02 RX ORDER — CALCIUM CHLORIDE 100 MG/ML
INJECTION INTRAVENOUS; INTRAVENTRICULAR PRN
Status: DISCONTINUED | OUTPATIENT
Start: 2021-06-02 | End: 2021-06-02

## 2021-06-02 RX ORDER — ACETAMINOPHEN 325 MG/1
975 TABLET ORAL ONCE
Status: COMPLETED | OUTPATIENT
Start: 2021-06-02 | End: 2021-06-02

## 2021-06-02 RX ORDER — GABAPENTIN 100 MG/1
300 CAPSULE ORAL
Status: COMPLETED | OUTPATIENT
Start: 2021-06-02 | End: 2021-06-02

## 2021-06-02 RX ORDER — NALOXONE HYDROCHLORIDE 0.4 MG/ML
0.4 INJECTION, SOLUTION INTRAMUSCULAR; INTRAVENOUS; SUBCUTANEOUS
Status: DISCONTINUED | OUTPATIENT
Start: 2021-06-02 | End: 2021-06-07 | Stop reason: HOSPADM

## 2021-06-02 RX ORDER — SODIUM CHLORIDE, SODIUM GLUCONATE, SODIUM ACETATE, POTASSIUM CHLORIDE AND MAGNESIUM CHLORIDE 526; 502; 368; 37; 30 MG/100ML; MG/100ML; MG/100ML; MG/100ML; MG/100ML
INJECTION, SOLUTION INTRAVENOUS CONTINUOUS PRN
Status: DISCONTINUED | OUTPATIENT
Start: 2021-06-02 | End: 2021-06-02

## 2021-06-02 RX ORDER — EPHEDRINE SULFATE 50 MG/ML
INJECTION, SOLUTION INTRAMUSCULAR; INTRAVENOUS; SUBCUTANEOUS PRN
Status: DISCONTINUED | OUTPATIENT
Start: 2021-06-02 | End: 2021-06-02

## 2021-06-02 RX ORDER — CEFAZOLIN SODIUM 2 G/100ML
2 INJECTION, SOLUTION INTRAVENOUS
Status: DISCONTINUED | OUTPATIENT
Start: 2021-06-02 | End: 2021-06-02 | Stop reason: CLARIF

## 2021-06-02 RX ORDER — DEXTROSE MONOHYDRATE, SODIUM CHLORIDE, AND POTASSIUM CHLORIDE 50; 1.49; 4.5 G/1000ML; G/1000ML; G/1000ML
INJECTION, SOLUTION INTRAVENOUS CONTINUOUS
Status: DISCONTINUED | OUTPATIENT
Start: 2021-06-02 | End: 2021-06-03

## 2021-06-02 RX ORDER — ONDANSETRON 4 MG/1
4 TABLET, ORALLY DISINTEGRATING ORAL EVERY 6 HOURS PRN
Status: DISCONTINUED | OUTPATIENT
Start: 2021-06-02 | End: 2021-06-07 | Stop reason: HOSPADM

## 2021-06-02 RX ORDER — DEXAMETHASONE SODIUM PHOSPHATE 4 MG/ML
INJECTION, SOLUTION INTRA-ARTICULAR; INTRALESIONAL; INTRAMUSCULAR; INTRAVENOUS; SOFT TISSUE PRN
Status: DISCONTINUED | OUTPATIENT
Start: 2021-06-02 | End: 2021-06-02

## 2021-06-02 RX ORDER — ALBUMIN HUMAN 5 %
INTRAVENOUS SOLUTION INTRAVENOUS PRN
Status: DISCONTINUED | OUTPATIENT
Start: 2021-06-02 | End: 2021-06-02

## 2021-06-02 RX ORDER — ONDANSETRON 2 MG/ML
INJECTION INTRAMUSCULAR; INTRAVENOUS PRN
Status: DISCONTINUED | OUTPATIENT
Start: 2021-06-02 | End: 2021-06-02

## 2021-06-02 RX ORDER — PROPOFOL 10 MG/ML
INJECTION, EMULSION INTRAVENOUS PRN
Status: DISCONTINUED | OUTPATIENT
Start: 2021-06-02 | End: 2021-06-02

## 2021-06-02 RX ORDER — MAGNESIUM SULFATE HEPTAHYDRATE 40 MG/ML
2 INJECTION, SOLUTION INTRAVENOUS ONCE
Status: COMPLETED | OUTPATIENT
Start: 2021-06-02 | End: 2021-06-02

## 2021-06-02 RX ORDER — LOSARTAN POTASSIUM 50 MG/1
50 TABLET ORAL DAILY
Status: DISCONTINUED | OUTPATIENT
Start: 2021-06-03 | End: 2021-06-02

## 2021-06-02 RX ORDER — PROPOFOL 10 MG/ML
25-150 INJECTION, EMULSION INTRAVENOUS CONTINUOUS
Status: DISCONTINUED | OUTPATIENT
Start: 2021-06-02 | End: 2021-06-02 | Stop reason: CLARIF

## 2021-06-02 RX ORDER — VANCOMYCIN HYDROCHLORIDE 1 G/20ML
INJECTION, POWDER, LYOPHILIZED, FOR SOLUTION INTRAVENOUS PRN
Status: DISCONTINUED | OUTPATIENT
Start: 2021-06-02 | End: 2021-06-02 | Stop reason: HOSPADM

## 2021-06-02 RX ORDER — OXYCODONE HYDROCHLORIDE 5 MG/1
5-10 TABLET ORAL
Status: DISCONTINUED | OUTPATIENT
Start: 2021-06-02 | End: 2021-06-07 | Stop reason: HOSPADM

## 2021-06-02 RX ORDER — FAMOTIDINE 20 MG/1
20 TABLET, FILM COATED ORAL 2 TIMES DAILY
Status: DISCONTINUED | OUTPATIENT
Start: 2021-06-02 | End: 2021-06-07 | Stop reason: HOSPADM

## 2021-06-02 RX ORDER — LOSARTAN POTASSIUM 50 MG/1
50 TABLET ORAL
Status: DISCONTINUED | OUTPATIENT
Start: 2021-06-02 | End: 2021-06-07 | Stop reason: HOSPADM

## 2021-06-02 RX ORDER — ACETAMINOPHEN 325 MG/1
975 TABLET ORAL ONCE
Status: DISCONTINUED | OUTPATIENT
Start: 2021-06-02 | End: 2021-06-02 | Stop reason: CLARIF

## 2021-06-02 RX ORDER — LIDOCAINE HYDROCHLORIDE 20 MG/ML
INJECTION, SOLUTION INFILTRATION; PERINEURAL PRN
Status: DISCONTINUED | OUTPATIENT
Start: 2021-06-02 | End: 2021-06-02

## 2021-06-02 RX ORDER — SODIUM CHLORIDE, SODIUM LACTATE, POTASSIUM CHLORIDE, CALCIUM CHLORIDE 600; 310; 30; 20 MG/100ML; MG/100ML; MG/100ML; MG/100ML
INJECTION, SOLUTION INTRAVENOUS CONTINUOUS
Status: DISCONTINUED | OUTPATIENT
Start: 2021-06-02 | End: 2021-06-02

## 2021-06-02 RX ORDER — METHOCARBAMOL 750 MG/1
750 TABLET, FILM COATED ORAL 4 TIMES DAILY PRN
Status: DISCONTINUED | OUTPATIENT
Start: 2021-06-02 | End: 2021-06-07 | Stop reason: HOSPADM

## 2021-06-02 RX ORDER — ACETAMINOPHEN 325 MG/1
975 TABLET ORAL EVERY 8 HOURS
Status: COMPLETED | OUTPATIENT
Start: 2021-06-02 | End: 2021-06-05

## 2021-06-02 RX ORDER — ONDANSETRON 2 MG/ML
4 INJECTION INTRAMUSCULAR; INTRAVENOUS EVERY 30 MIN PRN
Status: DISCONTINUED | OUTPATIENT
Start: 2021-06-02 | End: 2021-06-02

## 2021-06-02 RX ORDER — VASOPRESSIN 20 U/ML
INJECTION PARENTERAL PRN
Status: DISCONTINUED | OUTPATIENT
Start: 2021-06-02 | End: 2021-06-02

## 2021-06-02 RX ORDER — HYDROMORPHONE HYDROCHLORIDE 1 MG/ML
.3-.5 INJECTION, SOLUTION INTRAMUSCULAR; INTRAVENOUS; SUBCUTANEOUS EVERY 5 MIN PRN
Status: DISCONTINUED | OUTPATIENT
Start: 2021-06-02 | End: 2021-06-02

## 2021-06-02 RX ORDER — SODIUM CHLORIDE, SODIUM LACTATE, POTASSIUM CHLORIDE, CALCIUM CHLORIDE 600; 310; 30; 20 MG/100ML; MG/100ML; MG/100ML; MG/100ML
INJECTION, SOLUTION INTRAVENOUS CONTINUOUS PRN
Status: DISCONTINUED | OUTPATIENT
Start: 2021-06-02 | End: 2021-06-02

## 2021-06-02 RX ORDER — ONDANSETRON 4 MG/1
4 TABLET, ORALLY DISINTEGRATING ORAL EVERY 30 MIN PRN
Status: DISCONTINUED | OUTPATIENT
Start: 2021-06-02 | End: 2021-06-02

## 2021-06-02 RX ORDER — NEOSTIGMINE METHYLSULFATE 1 MG/ML
VIAL (ML) INJECTION PRN
Status: DISCONTINUED | OUTPATIENT
Start: 2021-06-02 | End: 2021-06-02

## 2021-06-02 RX ORDER — LIDOCAINE 40 MG/G
CREAM TOPICAL
Status: DISCONTINUED | OUTPATIENT
Start: 2021-06-02 | End: 2021-06-07 | Stop reason: HOSPADM

## 2021-06-02 RX ORDER — FENTANYL CITRATE 50 UG/ML
25-50 INJECTION, SOLUTION INTRAMUSCULAR; INTRAVENOUS
Status: DISCONTINUED | OUTPATIENT
Start: 2021-06-02 | End: 2021-06-02

## 2021-06-02 RX ORDER — CEFAZOLIN SODIUM 2 G/100ML
2 INJECTION, SOLUTION INTRAVENOUS EVERY 8 HOURS
Status: COMPLETED | OUTPATIENT
Start: 2021-06-03 | End: 2021-06-05

## 2021-06-02 RX ORDER — ONDANSETRON 2 MG/ML
4 INJECTION INTRAMUSCULAR; INTRAVENOUS EVERY 6 HOURS PRN
Status: DISCONTINUED | OUTPATIENT
Start: 2021-06-02 | End: 2021-06-07 | Stop reason: HOSPADM

## 2021-06-02 RX ORDER — BISACODYL 10 MG
10 SUPPOSITORY, RECTAL RECTAL DAILY PRN
Status: DISCONTINUED | OUTPATIENT
Start: 2021-06-02 | End: 2021-06-07 | Stop reason: HOSPADM

## 2021-06-02 RX ORDER — GLYCOPYRROLATE 0.2 MG/ML
INJECTION, SOLUTION INTRAMUSCULAR; INTRAVENOUS PRN
Status: DISCONTINUED | OUTPATIENT
Start: 2021-06-02 | End: 2021-06-02

## 2021-06-02 RX ORDER — POLYETHYLENE GLYCOL 3350 17 G/17G
17 POWDER, FOR SOLUTION ORAL DAILY
Status: DISCONTINUED | OUTPATIENT
Start: 2021-06-03 | End: 2021-06-07 | Stop reason: HOSPADM

## 2021-06-02 RX ORDER — HYDROMORPHONE HYDROCHLORIDE 1 MG/ML
0.2 INJECTION, SOLUTION INTRAMUSCULAR; INTRAVENOUS; SUBCUTANEOUS
Status: DISCONTINUED | OUTPATIENT
Start: 2021-06-02 | End: 2021-06-07 | Stop reason: HOSPADM

## 2021-06-02 RX ORDER — CEFAZOLIN SODIUM 2 G/100ML
2 INJECTION, SOLUTION INTRAVENOUS SEE ADMIN INSTRUCTIONS
Status: DISCONTINUED | OUTPATIENT
Start: 2021-06-02 | End: 2021-06-02 | Stop reason: CLARIF

## 2021-06-02 RX ORDER — PROCHLORPERAZINE MALEATE 10 MG
10 TABLET ORAL EVERY 6 HOURS PRN
Status: DISCONTINUED | OUTPATIENT
Start: 2021-06-02 | End: 2021-06-07 | Stop reason: HOSPADM

## 2021-06-02 RX ORDER — AMOXICILLIN 250 MG
2 CAPSULE ORAL 2 TIMES DAILY
Status: DISCONTINUED | OUTPATIENT
Start: 2021-06-02 | End: 2021-06-07 | Stop reason: HOSPADM

## 2021-06-02 RX ORDER — KETAMINE HYDROCHLORIDE 10 MG/ML
INJECTION INTRAMUSCULAR; INTRAVENOUS PRN
Status: DISCONTINUED | OUTPATIENT
Start: 2021-06-02 | End: 2021-06-02

## 2021-06-02 RX ORDER — GABAPENTIN 300 MG/1
300 CAPSULE ORAL 2 TIMES DAILY
Status: COMPLETED | OUTPATIENT
Start: 2021-06-02 | End: 2021-06-05

## 2021-06-02 RX ADMIN — MAGNESIUM SULFATE 2 G: 2 INJECTION INTRAVENOUS at 08:28

## 2021-06-02 RX ADMIN — NEOSTIGMINE METHYLSULFATE 3 MG: 1 INJECTION, SOLUTION INTRAVENOUS at 08:16

## 2021-06-02 RX ADMIN — Medication 125 ML: at 09:41

## 2021-06-02 RX ADMIN — DEXAMETHASONE SODIUM PHOSPHATE 6 MG: 4 INJECTION, SOLUTION INTRAMUSCULAR; INTRAVENOUS at 08:24

## 2021-06-02 RX ADMIN — Medication 2 G: at 11:55

## 2021-06-02 RX ADMIN — SUFENTANIL CITRATE 0.2 MCG/KG/HR: 50 INJECTION EPIDURAL; INTRAVENOUS at 08:02

## 2021-06-02 RX ADMIN — SODIUM CHLORIDE, POTASSIUM CHLORIDE, SODIUM LACTATE AND CALCIUM CHLORIDE: 600; 310; 30; 20 INJECTION, SOLUTION INTRAVENOUS at 16:10

## 2021-06-02 RX ADMIN — PHENYLEPHRINE HYDROCHLORIDE 50 MCG: 10 INJECTION INTRAVENOUS at 09:15

## 2021-06-02 RX ADMIN — CALCIUM CHLORIDE 500 MG: 100 INJECTION, SOLUTION INTRAVENOUS at 15:50

## 2021-06-02 RX ADMIN — FAMOTIDINE 20 MG: 20 TABLET ORAL at 22:28

## 2021-06-02 RX ADMIN — ACETAMINOPHEN 975 MG: 325 TABLET, FILM COATED ORAL at 19:24

## 2021-06-02 RX ADMIN — ACETAMINOPHEN 975 MG: 325 TABLET, FILM COATED ORAL at 06:14

## 2021-06-02 RX ADMIN — NOREPINEPHRINE BITARTRATE 3.2 MCG: 1 INJECTION, SOLUTION, CONCENTRATE INTRAVENOUS at 15:35

## 2021-06-02 RX ADMIN — Medication 5 MG: at 10:23

## 2021-06-02 RX ADMIN — SODIUM CHLORIDE, SODIUM GLUCONATE, SODIUM ACETATE, POTASSIUM CHLORIDE AND MAGNESIUM CHLORIDE: 526; 502; 368; 37; 30 INJECTION, SOLUTION INTRAVENOUS at 13:54

## 2021-06-02 RX ADMIN — HUMAN INSULIN 1.5 UNITS/HR: 100 INJECTION, SOLUTION SUBCUTANEOUS at 16:03

## 2021-06-02 RX ADMIN — Medication 2 G: at 15:55

## 2021-06-02 RX ADMIN — PHENYLEPHRINE HYDROCHLORIDE 100 MCG: 10 INJECTION INTRAVENOUS at 09:05

## 2021-06-02 RX ADMIN — GABAPENTIN 300 MG: 300 CAPSULE ORAL at 06:14

## 2021-06-02 RX ADMIN — DOCUSATE SODIUM 50MG AND SENNOSIDES 8.6MG 2 TABLET: 8.6; 5 TABLET, FILM COATED ORAL at 22:27

## 2021-06-02 RX ADMIN — NOREPINEPHRINE BITARTRATE 3.2 MCG: 1 INJECTION, SOLUTION, CONCENTRATE INTRAVENOUS at 15:03

## 2021-06-02 RX ADMIN — GABAPENTIN 300 MG: 300 CAPSULE ORAL at 19:25

## 2021-06-02 RX ADMIN — PHENYLEPHRINE HYDROCHLORIDE 50 MCG: 10 INJECTION INTRAVENOUS at 11:19

## 2021-06-02 RX ADMIN — FENTANYL CITRATE 50 MCG: 50 INJECTION INTRAMUSCULAR; INTRAVENOUS at 18:19

## 2021-06-02 RX ADMIN — VASOPRESSIN 0.5 UNITS: 20 INJECTION INTRAVENOUS at 13:08

## 2021-06-02 RX ADMIN — PROPOFOL 170 MG: 10 INJECTION, EMULSION INTRAVENOUS at 07:33

## 2021-06-02 RX ADMIN — PROPOFOL 30 MG: 10 INJECTION, EMULSION INTRAVENOUS at 08:56

## 2021-06-02 RX ADMIN — NOREPINEPHRINE BITARTRATE 3.2 MCG: 1 INJECTION, SOLUTION, CONCENTRATE INTRAVENOUS at 14:06

## 2021-06-02 RX ADMIN — Medication 125 ML: at 11:31

## 2021-06-02 RX ADMIN — NOREPINEPHRINE BITARTRATE 3.2 MCG: 1 INJECTION, SOLUTION, CONCENTRATE INTRAVENOUS at 15:40

## 2021-06-02 RX ADMIN — Medication 10 MG: at 07:48

## 2021-06-02 RX ADMIN — ONDANSETRON 4 MG: 2 INJECTION INTRAMUSCULAR; INTRAVENOUS at 16:37

## 2021-06-02 RX ADMIN — NOREPINEPHRINE BITARTRATE 3.2 MCG: 1 INJECTION, SOLUTION, CONCENTRATE INTRAVENOUS at 15:31

## 2021-06-02 RX ADMIN — PHENYLEPHRINE HYDROCHLORIDE 50 MCG: 10 INJECTION INTRAVENOUS at 12:53

## 2021-06-02 RX ADMIN — PHENYLEPHRINE HYDROCHLORIDE 50 MCG: 10 INJECTION INTRAVENOUS at 12:11

## 2021-06-02 RX ADMIN — CALCIUM CHLORIDE 500 MG: 100 INJECTION, SOLUTION INTRAVENOUS at 13:58

## 2021-06-02 RX ADMIN — Medication 20 MG: at 16:09

## 2021-06-02 RX ADMIN — LIDOCAINE HYDROCHLORIDE 60 MG: 20 INJECTION, SOLUTION INFILTRATION; PERINEURAL at 07:33

## 2021-06-02 RX ADMIN — SODIUM CHLORIDE, SODIUM GLUCONATE, SODIUM ACETATE, POTASSIUM CHLORIDE AND MAGNESIUM CHLORIDE: 526; 502; 368; 37; 30 INJECTION, SOLUTION INTRAVENOUS at 10:58

## 2021-06-02 RX ADMIN — OXYCODONE HYDROCHLORIDE 5 MG: 5 TABLET ORAL at 19:42

## 2021-06-02 RX ADMIN — Medication 20 MG: at 14:46

## 2021-06-02 RX ADMIN — PHENYLEPHRINE HYDROCHLORIDE 0.1 MCG/KG/MIN: 10 INJECTION INTRAVENOUS at 08:30

## 2021-06-02 RX ADMIN — SODIUM CHLORIDE, POTASSIUM CHLORIDE, SODIUM LACTATE AND CALCIUM CHLORIDE: 600; 310; 30; 20 INJECTION, SOLUTION INTRAVENOUS at 07:27

## 2021-06-02 RX ADMIN — PHENYLEPHRINE HYDROCHLORIDE 50 MCG: 10 INJECTION INTRAVENOUS at 13:44

## 2021-06-02 RX ADMIN — PHENYLEPHRINE HYDROCHLORIDE 50 MCG: 10 INJECTION INTRAVENOUS at 12:36

## 2021-06-02 RX ADMIN — Medication 250 ML: at 12:53

## 2021-06-02 RX ADMIN — PROPOFOL 75 MCG/KG/MIN: 10 INJECTION, EMULSION INTRAVENOUS at 08:02

## 2021-06-02 RX ADMIN — SODIUM CHLORIDE, SODIUM GLUCONATE, SODIUM ACETATE, POTASSIUM CHLORIDE AND MAGNESIUM CHLORIDE: 526; 502; 368; 37; 30 INJECTION, SOLUTION INTRAVENOUS at 07:46

## 2021-06-02 RX ADMIN — Medication 30 MG: at 07:36

## 2021-06-02 RX ADMIN — TRANEXAMIC ACID: 100 INJECTION, SOLUTION INTRAVENOUS at 12:24

## 2021-06-02 RX ADMIN — Medication 5 MG: at 10:06

## 2021-06-02 RX ADMIN — ROCURONIUM BROMIDE 20 MG: 10 INJECTION INTRAVENOUS at 08:53

## 2021-06-02 RX ADMIN — HYDROMORPHONE HYDROCHLORIDE 0.3 MG: 1 INJECTION, SOLUTION INTRAMUSCULAR; INTRAVENOUS; SUBCUTANEOUS at 18:51

## 2021-06-02 RX ADMIN — ROCURONIUM BROMIDE 30 MG: 10 INJECTION INTRAVENOUS at 08:21

## 2021-06-02 RX ADMIN — Medication 2 G: at 08:06

## 2021-06-02 RX ADMIN — Medication 125 ML: at 10:02

## 2021-06-02 RX ADMIN — Medication 250 ML: at 09:19

## 2021-06-02 RX ADMIN — PHENYLEPHRINE HYDROCHLORIDE 50 MCG: 10 INJECTION INTRAVENOUS at 13:52

## 2021-06-02 RX ADMIN — PROPOFOL: 10 INJECTION, EMULSION INTRAVENOUS at 12:15

## 2021-06-02 RX ADMIN — NOREPINEPHRINE BITARTRATE 3.2 MCG: 1 INJECTION, SOLUTION, CONCENTRATE INTRAVENOUS at 16:28

## 2021-06-02 RX ADMIN — TRANEXAMIC ACID 10 MG/KG/HR: 100 INJECTION, SOLUTION INTRAVENOUS at 08:22

## 2021-06-02 RX ADMIN — PHENYLEPHRINE HYDROCHLORIDE 50 MCG: 10 INJECTION INTRAVENOUS at 10:13

## 2021-06-02 RX ADMIN — SODIUM CHLORIDE, POTASSIUM CHLORIDE, SODIUM LACTATE AND CALCIUM CHLORIDE: 600; 310; 30; 20 INJECTION, SOLUTION INTRAVENOUS at 17:01

## 2021-06-02 RX ADMIN — NOREPINEPHRINE BITARTRATE 3.2 MCG: 1 INJECTION, SOLUTION, CONCENTRATE INTRAVENOUS at 14:10

## 2021-06-02 RX ADMIN — GLYCOPYRROLATE 0.6 MG: 0.2 INJECTION, SOLUTION INTRAMUSCULAR; INTRAVENOUS at 08:16

## 2021-06-02 RX ADMIN — VASOPRESSIN 0.5 UNITS: 20 INJECTION INTRAVENOUS at 13:20

## 2021-06-02 RX ADMIN — Medication 125 ML: at 11:43

## 2021-06-02 RX ADMIN — PROPOFOL 50 MCG/KG/MIN: 10 INJECTION, EMULSION INTRAVENOUS at 09:35

## 2021-06-02 RX ADMIN — PHENYLEPHRINE HYDROCHLORIDE 50 MCG: 10 INJECTION INTRAVENOUS at 08:33

## 2021-06-02 RX ADMIN — NOREPINEPHRINE BITARTRATE 3.2 MCG: 1 INJECTION, SOLUTION, CONCENTRATE INTRAVENOUS at 15:50

## 2021-06-02 RX ADMIN — Medication 10 MG: at 09:33

## 2021-06-02 RX ADMIN — Medication 10 MG: at 11:33

## 2021-06-02 RX ADMIN — GLYCOPYRROLATE 0.2 MG: 0.2 INJECTION, SOLUTION INTRAMUSCULAR; INTRAVENOUS at 08:53

## 2021-06-02 RX ADMIN — SUGAMMADEX 200 MG: 100 INJECTION, SOLUTION INTRAVENOUS at 16:37

## 2021-06-02 RX ADMIN — MIDAZOLAM 2 MG: 1 INJECTION INTRAMUSCULAR; INTRAVENOUS at 07:24

## 2021-06-02 RX ADMIN — PHENYLEPHRINE HYDROCHLORIDE 200 MCG: 10 INJECTION INTRAVENOUS at 16:33

## 2021-06-02 RX ADMIN — POTASSIUM CHLORIDE, DEXTROSE MONOHYDRATE AND SODIUM CHLORIDE: 150; 5; 450 INJECTION, SOLUTION INTRAVENOUS at 22:27

## 2021-06-02 RX ADMIN — PHENYLEPHRINE HYDROCHLORIDE 50 MCG: 10 INJECTION INTRAVENOUS at 11:48

## 2021-06-02 RX ADMIN — TRANEXAMIC ACID 3470 MG: 1 INJECTION, SOLUTION INTRAVENOUS at 08:02

## 2021-06-02 RX ADMIN — ROCURONIUM BROMIDE 50 MG: 10 INJECTION INTRAVENOUS at 07:34

## 2021-06-02 RX ADMIN — FENTANYL CITRATE 25 MCG: 50 INJECTION INTRAMUSCULAR; INTRAVENOUS at 17:58

## 2021-06-02 RX ADMIN — VASOPRESSIN 0.5 UNITS: 20 INJECTION INTRAVENOUS at 13:35

## 2021-06-02 RX ADMIN — VASOPRESSIN 0.5 UNITS: 20 INJECTION INTRAVENOUS at 13:03

## 2021-06-02 RX ADMIN — NOREPINEPHRINE BITARTRATE 3.2 MCG: 1 INJECTION, SOLUTION, CONCENTRATE INTRAVENOUS at 14:18

## 2021-06-02 ASSESSMENT — MIFFLIN-ST. JEOR: SCORE: 1750.63

## 2021-06-02 NOTE — CONSULTS
[facility]  Consult Note - Hospitalist Service     Date of Admission:  6/2/2021  Consult Requested by: Marcos Burk MD  Reason for Consult: Postoperative co-management     Assessment & Plan   Madyson Murray is a 66 year old female admitted on 6/2/2021. She has a known H/O  obesity, HTN, pulmonary HTN, tricuspid regurgitation, and venous insufficiency, DAE, She underwent Thoracic 11 to Pelvis posterior instrumented fusion with Lumbar 2 to 4 and 5 to 1  interbody fusion with lee stoddard osteotomy 4-5 and 3 column osteotomy 2-3 and 5-1 with O-Arm/Stealth Navigation, use of allograft, local autograft, bone morphogenic protein, dural repair  Internal medicine consulted for postoperative co management  Individual problems and their management are outlined below      S/P Spine purgery, POD#0:  Management by primary team. Please see their notes for further details  Continue IV fluids until able to take oral diet   Pain control with acetaminophen 975 mg every 8 hrs for 3 days  Oxycodone 5-10 mg every 3 hrs PRN and IV hydromorphone 0.2.mg q 2 hrs  PRN for breakthrough pain   DVT prophylaxis with  SCDs while in hospital  Perioperative antibiotics as per primary team   Overnight Capnography monitoring  PT/OT as per protocol  [place] to come out on POD#1 unless clinically indicated to remain.  Incentive spirometry and aggressive bowel regimen (This has been ordered)  We will monitor for acute blood loss anemia. Pre op Hgb at 13.1    DAE:   Resume home CPAP      HTN:  Resume home dose of Losartan 50 mg after BMP check     Obesity:   Stable   Recent 30 pound weight loss        The patient's care was discussed with the Bedside Nurse and Patient.    Robbie Almonte MD  [facility]  Contact information available via [facility] Paging/Directory    ______________________________________________________________________    Chief Complaint    S/P Thoracic 11 to Pelvis posterior instrumented fusion with Lumbar 2 to 4 and 5 to 1  interbody fusion with lee stoddard osteotomy 4-5 and 3 column osteotomy 2-3 and 5-1 with O-Arm/Stealth Navigation, use of allograft, local autograft, bone morphogenic protein, dural repair    History is obtained from the patient, pre op physical and perioperative notes     History of Present Illness   Madyson Murray is a 66 year old female who underwent the above procedure today.  The procedure was eventful such that, patient lost about 3075 mL of blood, of which 1000 mL was given back via Cell Saver.  Patient received about 4000 mL of crystalloids and 1000 mL of 5% albumin.    Postoperatively patient recovered well.  I met patient up on floor 8 Ortho, where she was resting comfortably.  She denies any chest pain or shortness of breath.  She denies any fevers or chills.  She denies any nausea, vomiting or diarrhea.  She denies any new onset of tingling, numbness or weakness in her lower extremities.    Patient says that she has brought her CPAP machine with her.  She uses this machine fairly regularly.  Patient has also lost 30 pound weight in the last few months in an attempt to control her back pain.  Her past medical history was reviewed and all her medications were reconciled.  She is in a pleasant mood.      Review of Systems   The 10 point Review of Systems is negative other than noted in the HPI or here.     Past Medical History    I have reviewed this patient's medical history and updated it with pertinent information if needed.   Past Medical History:   Diagnosis Date     Chronic bilateral low back pain without sciatica      Degenerative scoliosis      Lumbar radiculopathy      Obesity      DAE (obstructive sleep apnea)      Pulmonary hypertension (H)     last echo normal     Tricuspid regurgitation      Venous (peripheral) insufficiency        Past Surgical History   I have reviewed this patient's surgical history and  updated it with pertinent information if needed.  Past Surgical History:   Procedure Laterality Date     JOINT REPLACEMENT Right      JOINT REPLACEMENT Left      ORTHOPEDIC SURGERY      2010 and 2012, total knees     TUBAL LIGATION         Social History   I have reviewed this patient's social history and updated it with pertinent information if needed.  Social History     Tobacco Use     Smoking status: Former Smoker     Packs/day: 1.00     Years: 15.00     Pack years: 15.00     Types: Cigarettes     Smokeless tobacco: Never Used     Tobacco comment: quit around age 32   Substance Use Topics     Alcohol use: Yes     Comment: 2-4 drinks couple times per week     Drug use: No       Family History   I have reviewed this patient's family history and updated it with pertinent information if needed.  Family History   Problem Relation Age of Onset     Hypertension Mother      Diabetes Father      Hypertension Father      Parkinsonism Father      Diabetes Sister      Hypertension Sister      Sleep Apnea Sister      Sleep Apnea Brother        Medications   I have reviewed this patient's current medications    Allergies   Allergies   Allergen Reactions     Adhesive [Liquid Adhesive]        Physical Exam   Vital Signs: Temp: 97.4  F (36.3  C) Temp src: Axillary BP: 123/54 Pulse: 87   Resp: 22 SpO2: 91 % O2 Device: Nasal cannula Oxygen Delivery: 3 LPM  Weight: 257 lbs 7.96 oz    Constitutional: fatigued, alert, cooperative, no apparent distress, appears stated age and morbidly obese  Eyes: Lids and lashes normal, pupils equal, round and reactive to light, extra ocular muscles intact, sclera clear, conjunctiva normal  ENT: Normocephalic, without obvious abnormality, atraumatic, sinuses nontender on palpation, external ears without lesions, oral pharynx with moist mucous membranes, tonsils without erythema or exudates, gums normal and good dentition.  Respiratory: No increased work of breathing, good air exchange, clear to  auscultation bilaterally, no crackles or wheezing  Cardiovascular: Normal apical impulse, regular rate and rhythm, normal S1 and S2, no S3 or S4, and no murmur noted  GI: No scars, normal bowel sounds, soft, non-distended, non-tender, no masses palpated, no hepatosplenomegally  Skin: no bruising or bleeding  Musculoskeletal: no lower extremity pitting edema present  Neurologic: Awake, alert, oriented to name, place and time.  Cranial nerves II-XII are grossly intact.  Motor is 5 out of 5 bilaterally.  Cerebellar finger to nose, heel to shin intact.  Sensory is intact.  Babinski down going, Romberg negative, and gait is normal.    Data   Results for orders placed or performed during the hospital encounter of 06/02/21 (from the past 24 hour(s))   Glucose by meter   Result Value Ref Range    Glucose 96 70 - 99 mg/dL   Lactic acid whole blood   Result Value Ref Range    Lactic Acid 0.8 0.7 - 2.0 mmol/L   Arterial Panel   Result Value Ref Range    pH Arterial 7.39 7.35 - 7.45 pH    pCO2 Arterial 42 35 - 45 mm Hg    pO2 Arterial 152 (H) 80 - 105 mm Hg    Bicarbonate Arterial 26 21 - 28 mmol/L    Base Excess Art 0.5 mmol/L    FIO2 31     Sodium 139 133 - 144 mmol/L    Potassium 4.1 3.4 - 5.3 mmol/L    Hemoglobin 11.9 11.7 - 15.7 g/dL    Glucose 128 (H) 70 - 99 mg/dL    Calcium Ionized Whole Blood 4.8 4.4 - 5.2 mg/dL   Arterial Panel   Result Value Ref Range    pH Arterial 7.41 7.35 - 7.45 pH    pCO2 Arterial 40 35 - 45 mm Hg    pO2 Arterial 132 (H) 80 - 105 mm Hg    Bicarbonate Arterial 25 21 - 28 mmol/L    Base Excess Art 0.3 mmol/L    FIO2 30     Sodium 139 133 - 144 mmol/L    Potassium 4.3 3.4 - 5.3 mmol/L    Hemoglobin 10.9 (L) 11.7 - 15.7 g/dL    Glucose 130 (H) 70 - 99 mg/dL    Calcium Ionized Whole Blood 4.5 4.4 - 5.2 mg/dL   Lactic acid whole blood   Result Value Ref Range    Lactic Acid 1.0 0.7 - 2.0 mmol/L   Lactic acid whole blood   Result Value Ref Range    Lactic Acid 1.0 0.7 - 2.0 mmol/L   Arterial Panel    Result Value Ref Range    pH Arterial 7.41 7.35 - 7.45 pH    pCO2 Arterial 39 35 - 45 mm Hg    pO2 Arterial 162 (H) 80 - 105 mm Hg    Bicarbonate Arterial 24 21 - 28 mmol/L    Base Deficit Art 0.2 mmol/L    FIO2 29     Sodium 138 133 - 144 mmol/L    Potassium 4.2 3.4 - 5.3 mmol/L    Hemoglobin 10.4 (L) 11.7 - 15.7 g/dL    Glucose 152 (H) 70 - 99 mg/dL    Calcium Ionized Whole Blood 4.4 4.4 - 5.2 mg/dL   Arterial Panel   Result Value Ref Range    pH Arterial 7.41 7.35 - 7.45 pH    pCO2 Arterial 38 35 - 45 mm Hg    pO2 Arterial 136 (H) 80 - 105 mm Hg    Bicarbonate Arterial 24 21 - 28 mmol/L    Base Deficit Art 0.6 mmol/L    FIO2 30     Sodium 138 133 - 144 mmol/L    Potassium 4.1 3.4 - 5.3 mmol/L    Hemoglobin 10.0 (L) 11.7 - 15.7 g/dL    Glucose 170 (H) 70 - 99 mg/dL    Calcium Ionized Whole Blood 4.3 (L) 4.4 - 5.2 mg/dL   Lactic acid whole blood   Result Value Ref Range    Lactic Acid 1.0 0.7 - 2.0 mmol/L   Arterial Panel   Result Value Ref Range    pH Arterial 7.39 7.35 - 7.45 pH    pCO2 Arterial 39 35 - 45 mm Hg    pO2 Arterial 126 (H) 80 - 105 mm Hg    Bicarbonate Arterial 24 21 - 28 mmol/L    Base Deficit Art 1.0 mmol/L    FIO2 FIO2 30%     Sodium 136 133 - 144 mmol/L    Potassium 4.5 3.4 - 5.3 mmol/L    Hemoglobin 10.8 (L) 11.7 - 15.7 g/dL    Glucose 183 (H) 70 - 99 mg/dL    Calcium Ionized Whole Blood 4.5 4.4 - 5.2 mg/dL   Fibrinogen activity   Result Value Ref Range    Fibrinogen 214 200 - 420 mg/dL   Hematocrit   Result Value Ref Range    Hematocrit 32.3 (L) 35.0 - 47.0 %   INR   Result Value Ref Range    INR 1.17 (H) 0.86 - 1.14   Platelet count   Result Value Ref Range    Platelet Count 225 150 - 450 10e9/L   Partial thromboplastin time   Result Value Ref Range    PTT 26 22 - 37 sec   XR Surgery ANIL L/T 5 Min Fluoro    Narrative    This exam was marked as non-reportable because it will not be read by a   radiologist or a [place] non-radiologist provider.         Arterial Panel   Result Value Ref Range     pH Arterial 7.39 7.35 - 7.45 pH    pCO2 Arterial 38 35 - 45 mm Hg    pO2 Arterial 132 (H) 80 - 105 mm Hg    Bicarbonate Arterial 23 21 - 28 mmol/L    Base Deficit Art 1.6 mmol/L    FIO2 FIO2 30%     Sodium 136 133 - 144 mmol/L    Potassium 4.5 3.4 - 5.3 mmol/L    Hemoglobin 11.0 (L) 11.7 - 15.7 g/dL    Glucose 171 (H) 70 - 99 mg/dL    Calcium Ionized Whole Blood 4.6 4.4 - 5.2 mg/dL   Hematocrit   Result Value Ref Range    Hematocrit 33.9 (L) 35.0 - 47.0 %   Glucose by meter   Result Value Ref Range    Glucose 170 (H) 70 - 99 mg/dL   Glucose by meter   Result Value Ref Range    Glucose 163 (H) 70 - 99 mg/dL   Glucose by meter   Result Value Ref Range    Glucose 127 (H) 70 - 99 mg/dL   CBC with platelets   Result Value Ref Range    WBC 14.7 (H) 4.0 - 11.0 10e9/L    RBC Count 3.66 (L) 3.8 - 5.2 10e12/L    Hemoglobin 10.7 (L) 11.7 - 15.7 g/dL    Hematocrit 32.9 (L) 35.0 - 47.0 %    MCV 90 78 - 100 fl    MCH 29.2 26.5 - 33.0 pg    MCHC 32.5 31.5 - 36.5 g/dL    RDW 13.2 10.0 - 15.0 %    Platelet Count 161 150 - 450 10e9/L

## 2021-06-02 NOTE — ANESTHESIA PREPROCEDURE EVALUATION
Anesthesia Pre-Procedure Evaluation    Patient: Madyson Murray   MRN: [id number] : 1955        Preoperative Diagnosis: Degenerative scoliosis in adult patient [M41.80]  Lumbar radiculopathy [M54.16]   Procedure : Procedure(s):  Thoracic 10 or 11 to Pelvis posterior instrumented fusion with Lumbar 1 or 2 to sacral 1  interbody fusion and lee stoddard osteotomy with O-Arm/Stealth Navigation, use of allograft, local autograft, and possible bone morphogenic protein,  possible bedrock or crow enrollment     Past Medical History:   Diagnosis Date     Chronic bilateral low back pain without sciatica      Degenerative scoliosis      Lumbar radiculopathy      Obesity      DAE (obstructive sleep apnea)      Pulmonary hypertension (H)     last echo normal     Tricuspid regurgitation      Venous (peripheral) insufficiency       Past Surgical History:   Procedure Laterality Date     JOINT REPLACEMENT Right      JOINT REPLACEMENT Left      ORTHOPEDIC SURGERY       and , total knees     TUBAL LIGATION        Allergies   Allergen Reactions     Adhesive [Liquid Adhesive]       Social History     Tobacco Use     Smoking status: Former Smoker     Packs/day: 1.00     Years: 15.00     Pack years: 15.00     Types: Cigarettes     Smokeless tobacco: Never Used     Tobacco comment: quit around age 32   Substance Use Topics     Alcohol use: Yes     Comment: 2-4 drinks couple times per week      Wt Readings from Last 1 Encounters:   21 115.7 kg (255 lb)        Anesthesia Evaluation   Pt has had prior anesthetic. Type: General.    No history of anesthetic complications       ROS/MED HX  ENT/Pulmonary:     (+) sleep apnea, uses CPAP, tobacco use, Past use,  (-) recent URI   Neurologic: Comment: Lumbar radiculopathy      Cardiovascular: Comment: Venous peripheral insufficiency   Pulmonary HYPERTENSION on earlier echocardiogram, repeat normal.      (+) hypertension-----valvular problems/murmurs mild TR. Previous cardiac  testing   Echo: Date: 4/28/21 Results:    Stress Test: Date: Results:    ECG Reviewed: Date: 1/29/21 Results:  NSR  Cath: Date: Results:   (-) taking anticoagulants/antiplatelets and pulmonary hypertension   METS/Exercise Tolerance: 3 - Able to walk 1-2 blocks without stopping Comment: Prolonged standing at her work.   Hematologic:  - neg hematologic  ROS     Musculoskeletal: Comment: Chronic back pain      GI/Hepatic:  - neg GI/hepatic ROS     Renal/Genitourinary:  - neg Renal ROS     Endo: Comment: Has lost 30#    (+) Obesity,     Psychiatric/Substance Use:  - neg psychiatric ROS     Infectious Disease:  - neg infectious disease ROS     Malignancy:  - neg malignancy ROS     Other:  - neg other ROS    (+) , H/O Chronic Pain,        Physical Exam    Airway        Mallampati: II   TM distance: > 3 FB   Neck ROM: full   Mouth opening: > 3 cm    Respiratory Devices and Support         Dental  no notable dental history         Cardiovascular   cardiovascular exam normal       Rhythm and rate: regular and normal     Pulmonary           breath sounds clear to auscultation           OUTSIDE LABS:  CBC:   Lab Results   Component Value Date    WBC 5.0 06/01/2021    HGB 13.1 06/01/2021    HCT 41.7 06/01/2021     06/01/2021     BMP:   Lab Results   Component Value Date     06/01/2021     01/06/2021    POTASSIUM 4.8 06/01/2021    POTASSIUM 4.3 01/06/2021    CHLORIDE 109 06/01/2021    CHLORIDE 107 01/06/2021    CO2 27 06/01/2021    CO2 28 01/06/2021    BUN 22 06/01/2021    BUN 13 01/06/2021    CR 0.69 06/01/2021    CR 0.74 01/06/2021     (H) 06/01/2021    GLC 91 01/06/2021     COAGS:   Lab Results   Component Value Date    INR 0.96 06/01/2021     POC: No results found for: BGM, HCG, HCGS  HEPATIC:   Lab Results   Component Value Date    ALBUMIN 3.6 06/01/2021    PROTTOTAL 7.2 06/01/2021    ALT 26 06/01/2021    AST 22 06/01/2021    ALKPHOS 107 06/01/2021    BILITOTAL 0.6 06/01/2021     OTHER:   Lab  Results   Component Value Date    A1C 5.7 02/05/2018    STUART 9.3 06/01/2021    TSH 2.38 03/05/2018       Anesthesia Plan    ASA Status:  3   NPO Status:  NPO Appropriate    Anesthesia Type: General.     - Airway: ETT   Induction: Intravenous.   Maintenance: Balanced.   Techniques and Equipment:     - Lines/Monitors: 2nd IV, Arterial Line     - Blood: PRBC     - Drips/Meds: Sufentanil, Tranexamic acid     Consents    Anesthesia Plan(s) and associated risks, benefits, and realistic alternatives discussed. Questions answered and patient/representative(s) expressed understanding.     - Discussed with:  Patient      - Extended Intubation/Ventilatory Support Discussed: No.      - Patient is DNR/DNI Status: No    Use of blood products discussed: No .     Postoperative Care    Pain management: IV analgesics.   PONV prophylaxis: Ondansetron (or other 5HT-3), Dexamethasone or Solumedrol     Comments:                Nehemiah Hurtado DO

## 2021-06-02 NOTE — ANESTHESIA PROCEDURE NOTES
Arterial Line Procedure Note  Pre-Procedure   Staff -        Anesthesiologist:  Nehemiah Hurtado DO       Performed By: anesthesiologist       Location: OR       Pre-Anesthestic Checklist: patient identified, IV checked, risks and benefits discussed, informed consent, monitors and equipment checked, pre-op evaluation and at physician/surgeon's request  Timeout:       Correct Patient: Yes        Correct Procedure: Yes        Correct Site: Yes        Correct Position: Yes   Procedure   Procedure: arterial line       Laterality: left       Insertion Site: radial.  Sterile Prep        Standard elements of sterile barrier followed       Skin prep: Chloraprep  Insertion/Injection        Technique: Seldinger Technique        Catheter Type/Size: 20 G, 1.75 in/4.5 cm quick cath (integral wire)  Narrative        Tegaderm dressing used.       Complications: None apparent,        Arterial waveform: Yes        IBP within 10% of NIBP: Yes

## 2021-06-02 NOTE — ANESTHESIA PROCEDURE NOTES
Airway       Patient location during procedure: OR  Staff -        Anesthesiologist:  Nehemiah Hurtado DO       CRNA: Angela Burgess APRN CRNA       Performed By: CRNA  Consent for Airway        Urgency: elective  Indications and Patient Condition       Indications for airway management: rachel-procedural       Induction type:intravenous       Mask difficulty assessment: 1 - vent by mask    Final Airway Details       Final airway type: endotracheal airway       Successful airway: ETT - single and Oral  Endotracheal Airway Details        ETT size (mm): 7.0       Cuffed: yes       Successful intubation technique: direct laryngoscopy       DL Blade Type: MAC 3       Grade View of Cords: 1       Adjucts: stylet       Position: Right       Measured from: gums/teeth       Secured at (cm): 21       Bite block used: Soft    Post intubation assessment        Placement verified by: capnometry, equal breath sounds and chest rise        Number of attempts at approach: 1       Secured with: pink tape       Ease of procedure: easy       Dentition: Intact and Unchanged    Medication(s) Administered   Medication Administration Time: 6/2/2021 7:36 AM    Additional Comments       Bilateral soft bite blocks in place for neuro monitoring

## 2021-06-02 NOTE — ANESTHESIA CARE TRANSFER NOTE
Patient: Madyson Murray    Procedure(s):  Thoracic 11 to Pelvis posterior instrumented fusion with Lumbar 2 to 4 and 5 to 1  interbody fusion with lee stoddard osteotomy 4-5 and 3 column osteotomy 2-3 and 5-1 with O-Arm/Stealth Navigation, use of allograft, local autograft, bone morphogenic protein,    Diagnosis: Degenerative scoliosis in adult patient [M41.80]  Lumbar radiculopathy [M54.16]  Diagnosis Additional Information: No value filed.    Anesthesia Type:   General     Note:    Oropharynx: oropharynx clear of all foreign objects and spontaneously breathing  Level of Consciousness: drowsy  Oxygen Supplementation: face mask  Level of Supplemental Oxygen (L/min / FiO2): 5  Independent Airway: airway patency satisfactory and stable  Dentition: dentition unchanged  Vital Signs Stable: post-procedure vital signs reviewed and stable  Report to RN Given: handoff report given  Patient transferred to: PACU    Handoff Report: Identifed the Patient, Identified the Reponsible Provider, Reviewed the pertinent medical history, Discussed the surgical course, Reviewed Intra-OP anesthesia mangement and issues during anesthesia, Set expectations for post-procedure period and Allowed opportunity for questions and acknowledgement of understanding      Vitals: (Last set prior to Anesthesia Care Transfer)  CRNA VITALS  6/2/2021 1625 - 6/2/2021 1708      6/2/2021             Pulse:  70    ART BP:  103/49    ART Mean:  64    Temp:  37.3  C (99.1  F)    SpO2:  93 %    Resp Rate (observed):  10        Electronically Signed By: PB Levy CRNA  June 2, 2021  5:08 PM

## 2021-06-02 NOTE — OP NOTE
DATE OF SURGERY: 6/2/2021    PREOPERATIVE DIAGNOSIS: Degenerative scoliosis in adult patient  Lumbar radiculopathy    Morbid Obesity, BMI 40            POSTOPERATIVE DIAGNOSIS: Same    PROCEDURES:  22-modifier: The patient has morbid obesity and had ankylosed disc spaces with very technically challenging anatomy, the combination of which increased the total operative time and technical difficulty and for this reason we have added a 22-modifier to the procedure.  1. L4 through 5 Amaya Monaco Osteotomies for complete neurologic decompression and regional deformity correction.  2. Three column osteotomy through L5-S1 and L2-3 disc spaces with release of all three columns through ankylosed disc segments and release of anterior osteophytes.  2. Transforaminal lumbar interbody fusion at L2-3, 4-5, 5-1, anterior and posterior fusion through a single approach.  3. L2-3, 4-5, 5-1 Capstone PEEK Cage.  4. T11 through S2 Posterior Spinal Fusion.  5. T11 through Pelvis Posterior Spinal Instrumentation, Medtronic Solera.  6. Blackey and use of Local Autograft.  7. Use of O-Arm navigational guidance.  8. Use of Allograft and Bone Morphogenic Protein    PRIMARY SURGEON: Gab León MD    Co-Surgeon: Yolanda Hamilton MD.  Neurosurgery.  The second surgeon was necessary to assist with intraoperative decision-making, minimize the overall operative time and blood loss.  The available residents were not qualified assistance for the complexity of this case.    FIRST ASSISTANT: Shin, Aura.    ANESTHESIA: General Endotracheal    COMPLICATIONS:  Incidental durotomy at L4-5 with watertight closure.    SPECIMENS: None.    ESTIMATED BLOOD LOSS: 3075 mL    INDICATIONS:                          Madyson Murray is a 66 year old female who elected surgical treatment, and understood the indications for this surgery, as well as its risks, benefits, and alternatives as documented in the pre-operative H&P.  Specifically, we  reviewed the risks and benefits of the surgery in detail. The risks include, but are not limited to, the general risks associated with anesthesia, including death, pulmonary embolism, DVT, stroke, myocardial infarction, pneumonia, and urinary tract infection. Additional risks specific to the surgery include the risk of infection, failure to heal (non-union), dural tear with resultant CSF leak which might necessitate placement of a drain or revision surgery or could result in headaches, nerve injury resulting in weakness or paralysis, risk of adjacent segment disease, the risks of vascular injury, need for revision surgery in the future due to one of the above issues, or risk of incomplete symptom relief. Madyson Murray understands the risks of the surgery and wishes to proceed. No guarantees were given.    DESCRIPTION OF PROCEDURE:           Madyson Murray was taken to the operating room, where the Anesthesiology Service induced satisfactory general anesthesia. Ancef was given IV.  Venous thromboembolic prophylaxis was performed with sequential devices.  A Okeefe catheter was placed under standard sterile techniques.  The patient was placed prone on an open OSI frame with the abdomen hanging free and all bony prominences well padded.  The low back was then prepped and draped in its entirety in the usual sterile fashion. We then held a multidisciplinary time out in which we verified the patient, procedure, antibiotics, and operative plan.  All team members were in agreement.    The incision was carried out from T11 to S2 with subperiosteal dissection out to the tips of the transverse processes. Intraoperative radiographic localization of the levels was again confirmed using fluoroscopy.      Of note, the patient has morbid obesity and this created a very deep wound which limited the access, visualization and increased our total operative time and blood loss, in addition to this she had really extensive bony overgrowth  through her joints as well as the disc spaces which means the release and deformity correction more technically challenging than usual and for this reason we have added a 22 modifier to the entire procedure.    We then turned our attention to the placement of pedicle screws.  The O-Arm was brought in and draped.  The reference frame was attached to the spinous process at L5.  We then performed our O-Arm spin.  After appropriate verification, the screws were placed at each level bilaterally from T11 through S1 in the following fashion: We used the clawfoot to identify the starting point.  A high-speed bur was used to penetrate the dorsal cortex.  We then used the gold handle pedicle probe to cannulate the pedicle while referencing the navigation screen for guidance.  The navigation system was used to measure the pedicle diameter and length, and an appropriate Pi-Cardia Solera screw size was chosen.  We under tapped by 1mm.  The screw was then placed under manual power.  This was done bilaterally at each of the instrumented levels.  The left L4 screw was dual headed.  The right L1 and T12 screws were dual headed.    The O-Arm was brought back in and we performed a verification spin.  The resultant CT showed the instrumentation to be completely intra-osseous with no obvious breaches or malposition.  This also verified that the correct level had been operated upon.   Of note, given the number of levels, this required two separate spins with the O-Arm.      I then turned my attention to the pelvic fixation.  Remove the reference frame up to L2.  We then took a spin of the pelvis.  We then placed bilateral stacked pelvic fixation.  This was done by using a navigated awl to cannulate a tract from the starting point down in between the tables of the ilium.  We tapped with a 6.5 mm tap and then a 9.5 mm tap and then 9.5 x 90 mm screws were placed.  She had extremely dense bone.  These were very rigid screws.    A check spin  verified that these were intraosseous.    We then turned our attention to the deformity correction.  I started at the L5 and S1 levels.  This level was partially ankylosed through the disc space with large bridging osteophytes.  It was really quite a bit of work to free this up from a technical standpoint.  We started by performing a standard Smith-Barnes osteotomy with removal of the bilateral facet joints using a combination of osteotomes and Kerrisons and expose the central canal as well as the traversing and exiting nerve roots.  We then carried the osteotomy down through the disc space which was ankylosed.  We used osteotomes to serially cut through the lateral osteophytes.  We then used a combination of turn and rotate distractors to serially dilate and eventually this broke the lateral and anterior osteophytes and allowed the anterior column to become mobile.  This completed our bony release through all 3 columns of the spine including the posterior middle and anterior columns through the release of the bony connections across the L5-S1 disc space.    We then serially trialed.  At this point we noted that there had been quite a bit of anterior release of the anterior longitudinal ligament.  The area was quite mobile.  We serially trialed and ended up selecting 0 degree lordotic 17 mm height by 22 mm length Medtronic cages.  These had good purchase posteriorly but there is still some gapping in the anterior segment due to the high level of lordosis at this segment from the AL L release.  We addressed this as noted below a later in the case.  The disc was packed with 12cc of local autograft and the cages were packed with a strip of bone morphogenic protein to complete the interbody fusion at this level.    We then went up to the L4-5 levels.  I used a narrow rongure to remove the interspinous ligament.  I then used an osteotome to remove the bilateral inferior articular processes.  I then split the ligamentum  flavum in the midline.  I used a Kerrison to resect it in a piecemeal fashion.  We carried the dissection out through the foramen I remove the superior articular process down flush with the caudal pedicle.  This completed the removal to bilateral facet joints, which is a Smith-Barnes osteotomy, which allowed for complete neurologic decompression as well as regional deformity correction at the end of the case.  I then entered the patient's right-sided disc space while protecting the traversing nerve root.  We thoroughly cleaned it out with turn and rotate distractors pituitaries and scrapers.  Once this was done I turned my attention to the right side and again thoroughly cleaned out the disc.   My partner worked simultaneously with me on this.  We then serially trialed and selected [id number] zero degree cages which were filled with a strip of bone morphogenic protein and we packed 6 cc of local autograft into the disc space.  This completed the interbody fusion at this segment.    Of note, and decompressing this level there was quite a bit of adhesion on the patient's right side and there was a small durotomy.  We are able to close this with 6 simple 6-0 Prolene sutures with a watertight closure.  It was reinforced with muscle graft.  It was clearly a watertight closure.  It was reinforced with fibrin glue at the end of the case.    We then moved up to the L2-3 level.  Similar to the L5-S1 disc this was also quite ankylosed and required a 3 column release with release of the lateral and anterior osteophytes to mobilize it. We started by performing a standard Smith-Barnes osteotomy with removal of the bilateral facet joints using a combination of osteotomes and Kerrisons and expose the central canal as well as the traversing and exiting nerve roots.  We then carried the osteotomy down through the disc space which was ankylosed.  We used osteotomes to serially cut through the lateral osteophytes.  We then used a  combination of turn and rotate distractors to serially dilate and eventually this broke the lateral and anterior osteophytes and allowed the anterior column to become mobile.  This completed our bony release through all 3 columns of the spine including the posterior middle and anterior columns through the release of the bony connections across the L5-S1 disc space. We then serially trialed.  We again selected a 0 degree lordotic cage.  I placed a single cage through the right disc interspace.  The disc had been thoroughly cleaned out as a part of our osteotomy through this segment.  I packed 9 cc of local autograft into the disc and then the cage was inserted on the patient's right side with fluoroscopic guidance.  This completed the interbody fusion at this segment.  We chose to do a unilateral cage so that it would allow for some collapse on the left side and allow for appropriate coronal plane deformity correction here.     I did take the hinged motorized operative table through a range of flexion to close down the L5-S1 osteotomy under fluoroscopic guidance given that it was excessively lordosed.  I dialed in the lordosis correction to the appropriate amount and this completed our regional deformity correction.     In addition, we used the right iliac screw as a kickstand.  A long olinda was placed up to the dual headed screws on the right side at T12 and L1.  We distracted approximately 3 cm across this.  This corrected the initial coronal plane deformity.  We then seated the 2 longitudinal rods from T11 down to the remaining pelvic fixation.  The accessory kickstand olinda was then removed.  We then used the in situ benders to introduce some additional kyphosis through the L5-S1 segment in order to try and reduce the amount of lordosis here again given the previously excessive lordosis.  At this point I palpated the L5-S1 cages and I felt they were quite secure in the disc space.  The set caps were provisionally  tightened.  We took her fluoroscopic images and I measured her on table pelvic incidence is 38 degrees against a total lumbar lordosis of 36 degrees.  I felt that we had reasonably corrected the sagittal alignment through this region and her coronal plane correction was really quite excellent compared to preoperative.  I took our final images and I was satisfied with the alignment..    I then replaced the kickstand olinda and then we also placed a second accessory olinda on her left side to complete the 4 olinda construct.  All the set caps were broken off.    I then decorticated the remaining midline laminar bone between T11 and S1 levels.  This is packed with the remaining local autograft, a large kit of BMP, as well as crushed cancellous allograft as a bone extender, to complete the posterior fusion from T11-S1 levels.    We then achieved hemostasis.  A a hemovac drain was placed above the fascia and 1 gram of vancomycin powder was placed into the wound.  We closed the wound in multiple layers of interrupted Vicryl, with monocryl and dermabond for the skin.  Upon closure, the patient was then transferred to a hospital bed and taken to recovery after extubation in stable condition.    I was present and scrubbed for the critical portions of the procedure, including patient positioning, the neurologic decompression, placement of instrumentation, performance of fusion and grafting, and verification of the instrumentation.    Gab León MD

## 2021-06-02 NOTE — BRIEF OP NOTE
[facility]    Brief Operative Note    Pre-operative diagnosis: Degenerative scoliosis in adult patient [M41.80]  Lumbar radiculopathy [M54.16]  Post-operative diagnosis Same as pre-operative diagnosis    Procedure: Procedure(s):  Thoracic 11 to Pelvis posterior instrumented fusion with Lumbar 2 to 4 and 5 to 1  interbody fusion with lee stoddard osteotomy 4-5 and 3 column osteotomy 2-3 and 5-1 with O-Arm/Stealth Navigation, use of allograft, local autograft, bone morphogenic protein, dural repair  Surgeon: Surgeon(s) and Role:     * Gab León MD - Primary     * Yolanda Hamilton MD - Assisting     * Marcos Burk MD - Resident - Assisting     * Moody Cook MD - Resident - Assisting  Anesthesia: General   Estimated blood loss: 3075  Returned via cell saver: 1000  Drains: Hemovac  Specimens: * No specimens in log *  Findings:   None.  Complications: Unintended puncture/laceration of the dura.  Implants:   Implant Name Type Inv. Item Serial No.  Lot No. LRB No. Used Action   DUAL JAMES MULTI-AXIAL SCREW 7.5 x 55mm    [employer] [id number] N/A 1 Implanted   Dual James Multi-axial Screw 6.5 x 50mm     [id number] N/A 1 Implanted   DUAL JAMES MULTI-AXIAL SCREW 6.5 x 50mm    [employer] [id number] N/A 1 Implanted   IMP SCR SET MEDT SOLERA BREAK OFF 5.5MM TI [id number] - [id number] Metallic Hardware/[place] IMP SCR SET MEDT SOLERA BREAK OFF 5.5MM TI [id number]  [employer]  N/A 23 Implanted   IMP SCR MEDT 5.5/6.0MM SOLERA 7.5X40MM MA [id number] - [id number] Metallic Hardware/[place] IMP SCR MEDT 5.5/6.0MM SOLERA 7.5X40MM MA [id number]  [employer]  N/A 2 Implanted   IMP SCR MEDT 5.5/6.0MM SOLERA 7.5X50MM MA [id number] - [id number] Metallic Hardware/[place] IMP SCR MEDT 5.5/6.0MM SOLERA 7.5X50MM MA [id number]  [employer]  N/A 4 Implanted   IMP SCR MEDT 5.5/6.0MM SOLERA 6.5X55MM MA [id number] - [id number] Metallic Hardware/[place] IMP  SCR MEDT 5.5/6.0MM SOLERA 6.5X55MM MA [id number]  [employer]  N/A 2 Implanted   IMP SCR MEDT 5.5/6.0MM SOLERA 6.5X50MM MA [id number] - [id number] Metallic Hardware/[place] IMP SCR MEDT 5.5/6.0MM SOLERA 6.5X50MM MA [id number]  [employer]  N/A 4 Implanted   IMP SCR MEDT 5.5/6.0MM SOLERA 6.5X45MM MA [id number] - [id number] Metallic Hardware/[place] IMP SCR MEDT 5.5/6.0MM SOLERA 6.5X45MM MA [id number]  [employer]  N/A 1 Implanted   9.5 x 90 Ballast Screw    [employer]  N/A 3 Implanted   9.5 x 80 Ballast Screw    [employer]  N/A 1 Implanted   GRAFT BONE INFUSE BMP LG [id number] - [id number]  GRAFT BONE INFUSE BMP LG [id number] [email or web address], [facility] [id number] N/A 1 Implanted   IMP SPACER MEDT CAPSTONE CONTROL [id number] 18DEG [id number] - [id number] Metallic Hardware/[place] IMP SPACER MEDT CAPSTONE CONTROL [id number] 18DEG [id number]  [employer] [id number] N/A 1 Implanted   IMP SPACER MEDT CAPSTONE CONTROL [id number] 18DEG [id number] - [id number] Metallic Hardware/[place] IMP SPACER MEDT CAPSTONE CONTROL [id number] 18DEG [id number]  [employer] [id number] N/A 1 Implanted   INTERBODY FUSION DEVICE    MEDTRONIC [id number] N/A 2 Implanted   INTERBODY FUSION DEVICE 10mm X 22mm X 0 DEG    MEDTRONIC [id number] N/A 1 Implanted   INTERBODY FUSION DEVICE 10mm X 22mm X 0 DEG    [employer]IC [id number] N/A 1 Implanted   INTERBODY FUSION DEVICE 12mm X 22mm X 0 DEG    [employer] [id number] N/A 1 Implanted   IMP SCR SET MEDT SOLERA BREAK OFF 5.5MM TI [id number] - [id number] Metallic Hardware/[place] IMP SCR SET MEDT SOLERA BREAK OFF 5.5MM TI [id number]  [employer]  N/A 3 Wasted   IMP REJI MEDT SOLERA LINED 5.9U004OR CHR [id number] - [id number] Metallic Hardware/[place] IMP REJI MEDT SOLERA LINED 5.2U165FV CHR [id number]  [employer]  N/A 3 Implanted   IMP SPACER MEDT CAPSTONE CONTROL [id number] 18DEG [id number] - [id number] Metallic Hardware/[place] IMP SPACER MEDT CAPSTONE CONTROL [id number] 18DEG [id number]  [employer]  N/A 2 Wasted   GRAFT BONE CRUSH CANC 30ML  [id number] - [id number] Bone/Tissue/Biologic GRAFT BONE CRUSH CANC 30ML 371423 [id number] MUSCULOSKELETAL PRITCHETT  N/A 1 Implanted   GRAFT BONE CRUSH CANC 30ML [id number] - [id number] Bone/Tissue/Biologic GRAFT BONE CRUSH CANC 30ML 328715 [id number] MUSCULOSKELETAL PRITCHETT  N/A 1 Implanted       Ortho Primary  Activity: Up with assist until independent. No excessive bending or twisting. No lifting >10 lbs x 6 weeks. No José lift for transfers.   Weight bearing status: WBAT.  Pain management: Transition from IV to PO as tolerated. No NSAIDs   Antibiotics: Ancef until drain is discontinued  Diet: Begin with clear fluids and progress diet as tolerated.   DVT prophylaxis: SCDs only. No chemical DVT ppx needed.  Imaging: XR Upright Lumbar PTDC - ordered.  Labs: Hgb POD#1.  Bracing/Splinting: None.  Dressings: Keep dressing c/d/i x 7 days then OK to shower.  Drains: Document output per shift, will be discontinued at Orthopedic Surgery discretion.  Okeefe catheter: Remove POD#1.   Physical Therapy/Occupational Therapy: Eval and treat.  Cultures: None.    Consults: Hospitalist.  Follow-up: Clinic with Dr. León in 2 weeks for wound check and 6 weeks with repeat x-rays.   Disposition: Pending progress with therapies, pain control on orals, and medical stability, anticipate discharge to home on POD #3-4.

## 2021-06-03 ENCOUNTER — APPOINTMENT (OUTPATIENT)
Dept: PHYSICAL THERAPY | Facility: CLINIC | Age: 66
DRG: 454 | End: 2021-06-03
Attending: STUDENT IN AN ORGANIZED HEALTH CARE EDUCATION/TRAINING PROGRAM
Payer: COMMERCIAL

## 2021-06-03 ENCOUNTER — APPOINTMENT (OUTPATIENT)
Dept: PHYSICAL THERAPY | Facility: CLINIC | Age: 66
DRG: 454 | End: 2021-06-03
Attending: ORTHOPAEDIC SURGERY
Payer: COMMERCIAL

## 2021-06-03 LAB
ANGLE RATE OF CLOT STRENGTH: 74.9 DEGREES (ref 53–72)
ANION GAP SERPL CALCULATED.3IONS-SCNC: 9 MMOL/L (ref 3–14)
BASOPHILS # BLD AUTO: 0 10E9/L (ref 0–0.2)
BASOPHILS NFR BLD AUTO: 0.2 %
BUN SERPL-MCNC: 17 MG/DL (ref 7–30)
CALCIUM SERPL-MCNC: 8 MG/DL (ref 8.5–10.1)
CHLORIDE SERPL-SCNC: 101 MMOL/L (ref 94–109)
CI HYPERCOAGULATION INDEX: 3.3 RATIO (ref 0–3)
CO2 SERPL-SCNC: 24 MMOL/L (ref 20–32)
CREAT SERPL-MCNC: 0.71 MG/DL (ref 0.52–1.04)
DIFFERENTIAL METHOD BLD: ABNORMAL
EOSINOPHIL # BLD AUTO: 0 10E9/L (ref 0–0.7)
EOSINOPHIL NFR BLD AUTO: 0 %
ERYTHROCYTE [DISTWIDTH] IN BLOOD BY AUTOMATED COUNT: 13.4 % (ref 10–15)
G ACTUAL CLOT STRENGTH: 11 KD/SC (ref 4.5–11)
GFR SERPL CREATININE-BSD FRML MDRD: >90 ML/MIN/{1.73_M2}
GLUCOSE SERPL-MCNC: 137 MG/DL (ref 70–99)
HCT VFR BLD AUTO: 28.3 % (ref 35–47)
HGB BLD-MCNC: 8.8 G/DL (ref 11.7–15.7)
HGB BLD-MCNC: 9.1 G/DL (ref 11.7–15.7)
IMM GRANULOCYTES # BLD: 0.1 10E9/L (ref 0–0.4)
IMM GRANULOCYTES NFR BLD: 0.6 %
K TIME TO SPEC CLOT STRENGTH: 1 MINUTE (ref 1–3)
LY30 LYSIS AT 30 MINUTES: 0.3 % (ref 0–8)
LY60 LYSIS AT 60 MINUTES: 2.4 % (ref 0–15)
LYMPHOCYTES # BLD AUTO: 1.6 10E9/L (ref 0.8–5.3)
LYMPHOCYTES NFR BLD AUTO: 16 %
MA MAXIMUM CLOT STRENGTH: 68.8 MM (ref 50–70)
MCH RBC QN AUTO: 29.2 PG (ref 26.5–33)
MCHC RBC AUTO-ENTMCNC: 32.2 G/DL (ref 31.5–36.5)
MCV RBC AUTO: 91 FL (ref 78–100)
MONOCYTES # BLD AUTO: 1 10E9/L (ref 0–1.3)
MONOCYTES NFR BLD AUTO: 10.2 %
NEUTROPHILS # BLD AUTO: 7.1 10E9/L (ref 1.6–8.3)
NEUTROPHILS NFR BLD AUTO: 73 %
NRBC # BLD AUTO: 0 10*3/UL
NRBC BLD AUTO-RTO: 0 /100
PLATELET # BLD AUTO: 146 10E9/L (ref 150–450)
POTASSIUM SERPL-SCNC: 4.9 MMOL/L (ref 3.4–5.3)
R TIME UNTIL CLOT FORMS: 4.1 MINUTE (ref 5–10)
RBC # BLD AUTO: 3.12 10E12/L (ref 3.8–5.2)
SODIUM SERPL-SCNC: 134 MMOL/L (ref 133–144)
WBC # BLD AUTO: 9.7 10E9/L (ref 4–11)

## 2021-06-03 PROCEDURE — 250N000011 HC RX IP 250 OP 636: Performed by: STUDENT IN AN ORGANIZED HEALTH CARE EDUCATION/TRAINING PROGRAM

## 2021-06-03 PROCEDURE — 5A09357 ASSISTANCE WITH RESPIRATORY VENTILATION, LESS THAN 24 CONSECUTIVE HOURS, CONTINUOUS POSITIVE AIRWAY PRESSURE: ICD-10-PCS | Performed by: INTERNAL MEDICINE

## 2021-06-03 PROCEDURE — 85018 HEMOGLOBIN: CPT | Performed by: INTERNAL MEDICINE

## 2021-06-03 PROCEDURE — 97161 PT EVAL LOW COMPLEX 20 MIN: CPT | Mod: GP | Performed by: PHYSICAL THERAPIST

## 2021-06-03 PROCEDURE — 99232 SBSQ HOSP IP/OBS MODERATE 35: CPT | Performed by: INTERNAL MEDICINE

## 2021-06-03 PROCEDURE — 250N000013 HC RX MED GY IP 250 OP 250 PS 637: Performed by: STUDENT IN AN ORGANIZED HEALTH CARE EDUCATION/TRAINING PROGRAM

## 2021-06-03 PROCEDURE — 97110 THERAPEUTIC EXERCISES: CPT | Mod: GP

## 2021-06-03 PROCEDURE — 120N000002 HC R&B MED SURG/OB UMMC

## 2021-06-03 PROCEDURE — 36415 COLL VENOUS BLD VENIPUNCTURE: CPT | Performed by: STUDENT IN AN ORGANIZED HEALTH CARE EDUCATION/TRAINING PROGRAM

## 2021-06-03 PROCEDURE — 97110 THERAPEUTIC EXERCISES: CPT | Mod: GP | Performed by: PHYSICAL THERAPIST

## 2021-06-03 PROCEDURE — 97530 THERAPEUTIC ACTIVITIES: CPT | Mod: GP | Performed by: PHYSICAL THERAPIST

## 2021-06-03 PROCEDURE — 80048 BASIC METABOLIC PNL TOTAL CA: CPT | Performed by: STUDENT IN AN ORGANIZED HEALTH CARE EDUCATION/TRAINING PROGRAM

## 2021-06-03 PROCEDURE — 258N000003 HC RX IP 258 OP 636: Performed by: INTERNAL MEDICINE

## 2021-06-03 PROCEDURE — 36415 COLL VENOUS BLD VENIPUNCTURE: CPT | Performed by: INTERNAL MEDICINE

## 2021-06-03 PROCEDURE — 85025 COMPLETE CBC W/AUTO DIFF WBC: CPT | Performed by: STUDENT IN AN ORGANIZED HEALTH CARE EDUCATION/TRAINING PROGRAM

## 2021-06-03 PROCEDURE — 97116 GAIT TRAINING THERAPY: CPT | Mod: GP

## 2021-06-03 RX ORDER — SODIUM CHLORIDE, SODIUM LACTATE, POTASSIUM CHLORIDE, CALCIUM CHLORIDE 600; 310; 30; 20 MG/100ML; MG/100ML; MG/100ML; MG/100ML
INJECTION, SOLUTION INTRAVENOUS
Status: DISPENSED
Start: 2021-06-03 | End: 2021-06-04

## 2021-06-03 RX ORDER — SODIUM CHLORIDE, SODIUM LACTATE, POTASSIUM CHLORIDE, CALCIUM CHLORIDE 600; 310; 30; 20 MG/100ML; MG/100ML; MG/100ML; MG/100ML
INJECTION, SOLUTION INTRAVENOUS CONTINUOUS
Status: DISCONTINUED | OUTPATIENT
Start: 2021-06-03 | End: 2021-06-05

## 2021-06-03 RX ADMIN — OXYCODONE HYDROCHLORIDE 5 MG: 5 TABLET ORAL at 00:26

## 2021-06-03 RX ADMIN — Medication 2 G: at 00:29

## 2021-06-03 RX ADMIN — ACETAMINOPHEN 975 MG: 325 TABLET, FILM COATED ORAL at 19:26

## 2021-06-03 RX ADMIN — Medication 2 G: at 23:55

## 2021-06-03 RX ADMIN — POLYETHYLENE GLYCOL 3350 17 G: 17 POWDER, FOR SOLUTION ORAL at 08:33

## 2021-06-03 RX ADMIN — Medication 2 G: at 16:03

## 2021-06-03 RX ADMIN — ACETAMINOPHEN 975 MG: 325 TABLET, FILM COATED ORAL at 12:14

## 2021-06-03 RX ADMIN — GABAPENTIN 300 MG: 300 CAPSULE ORAL at 08:33

## 2021-06-03 RX ADMIN — FAMOTIDINE 20 MG: 20 TABLET ORAL at 20:16

## 2021-06-03 RX ADMIN — SODIUM CHLORIDE, POTASSIUM CHLORIDE, SODIUM LACTATE AND CALCIUM CHLORIDE: 600; 310; 30; 20 INJECTION, SOLUTION INTRAVENOUS at 09:38

## 2021-06-03 RX ADMIN — DOCUSATE SODIUM 50MG AND SENNOSIDES 8.6MG 2 TABLET: 8.6; 5 TABLET, FILM COATED ORAL at 08:33

## 2021-06-03 RX ADMIN — OXYCODONE HYDROCHLORIDE 10 MG: 5 TABLET ORAL at 05:16

## 2021-06-03 RX ADMIN — OXYCODONE HYDROCHLORIDE 10 MG: 5 TABLET ORAL at 08:27

## 2021-06-03 RX ADMIN — GABAPENTIN 300 MG: 300 CAPSULE ORAL at 19:26

## 2021-06-03 RX ADMIN — SODIUM CHLORIDE, POTASSIUM CHLORIDE, SODIUM LACTATE AND CALCIUM CHLORIDE: 600; 310; 30; 20 INJECTION, SOLUTION INTRAVENOUS at 20:16

## 2021-06-03 RX ADMIN — OXYCODONE HYDROCHLORIDE 10 MG: 5 TABLET ORAL at 12:13

## 2021-06-03 RX ADMIN — ACETAMINOPHEN 975 MG: 325 TABLET, FILM COATED ORAL at 02:29

## 2021-06-03 RX ADMIN — METHOCARBAMOL 750 MG: 750 TABLET ORAL at 12:13

## 2021-06-03 RX ADMIN — SODIUM CHLORIDE, POTASSIUM CHLORIDE, SODIUM LACTATE AND CALCIUM CHLORIDE: 600; 310; 30; 20 INJECTION, SOLUTION INTRAVENOUS at 09:00

## 2021-06-03 RX ADMIN — DOCUSATE SODIUM 50MG AND SENNOSIDES 8.6MG 2 TABLET: 8.6; 5 TABLET, FILM COATED ORAL at 20:16

## 2021-06-03 RX ADMIN — FAMOTIDINE 20 MG: 20 TABLET ORAL at 08:33

## 2021-06-03 RX ADMIN — METHOCARBAMOL 750 MG: 750 TABLET ORAL at 19:26

## 2021-06-03 RX ADMIN — OXYCODONE HYDROCHLORIDE 10 MG: 5 TABLET ORAL at 19:26

## 2021-06-03 RX ADMIN — Medication 2 G: at 08:39

## 2021-06-03 NOTE — OR NURSING
SERGIO Reese at bedside, notified of patient's saturations dipping into the 80s then recovering, and patient appears pale, but describing pain at 10. Per SERGIO Reese, to give Tylenol and Neurotin and then oxycodone later. MDA Swenson called again at 1927 converning patient's pale appearance and poor perfusion, Stat CBC ordered and sent.

## 2021-06-03 NOTE — PROGRESS NOTES
"      VS:   Blood pressure (!) 118/35, pulse 80, temperature 98.8  F (37.1  C), temperature source Oral, resp. rate 20, height 1.702 m (5' 7\"), weight 116.8 kg (257 lb 8 oz), SpO2 100 %, not currently breastfeeding.     Output:   Okeefe in place   Activity:   Chairfast, pt not OOB yet, sat at edge   Skin: Ex rednessness under breast and abd fold   Pain:   Pain managed with oxycodone, robaxin and tylenol   Neuro/CMS:   WDL   Dressing(s):   CDI   Diet:   Reg diet, had nausea and emesis x1 declined med   LDA:   Hemovac, Okeefe, PIV, CPAP not working using 02 by NC   Equipment:      Plan:   Continue pain control incentive spirometer use was able to get to 1500   Additional Info:   Hgb 9.1 today, diastolic BPs low and Dr Trinidad aware.        "

## 2021-06-03 NOTE — PROGRESS NOTES
"CLINICAL NUTRITION SERVICES - ASSESSMENT NOTE     Nutrition Prescription    RECOMMENDATIONS FOR MDs/PROVIDERS TO ORDER:  None today    Malnutrition Status:    Patient does not meet two of the established criteria necessary for diagnosing malnutrition    Recommendations already ordered by Registered Dietitian (RD):  Diet education    Future/Additional Recommendations:  Monitor intakes and wt  Consider supplements pending intakes      REASON FOR ASSESSMENT  Madyson Murray is a 66 year old female assessed by the dietitian for Provider Order - Dietitian to Assess and Order per Nutrition Protocol.  PMH significant for obesity, HTN, pulmonary HTN, tricuspid regurgitation, and venous insufficiency, DAE, She underwent Thoracic 11 to Pelvis posterior instrumented fusion with Lumbar 2 to 4 and 5 to 1  interbody fusion with lee stoddard osteotomy 4-5 and 3 column osteotomy 2-3 and 5-1 with O-Arm/Stealth Navigation, use of allograft, local autograft, bone morphogenic protein, dural repair  NUTRITION HISTORY  Pt reports a good appetite and intakes PTA. Reports a UBW of 286#, purposefully lost weight for her surgery. Was following a keto diet at home.   Breakfast: 2 sausage patties and an egg   Works 5am-1pm   Snack: pepperoni, salami, cheese sticks   Dinner: chicken breast or hamburger    CURRENT NUTRITION ORDERS  Diet: Regular  Intake/Tolerance: Has not eaten since admit but is taking fluids well. Reported 1 episode of emesis when sitting up (now resolved). Encouraged pt to try and order something, encouraged bland foods (low fat, not spicy, etc) and adequate protein for post op healing. Informed pt of PRN zofran and compazine order. Pt stated she will likely continue to follow the keto diet at [facility] but \"cheat\" every so often. Discussed lower carb fruits/veggies to incorporate into her diet.     LABS  Labs reviewed    MEDICATIONS  Medications reviewed:  pepcid  miralax  senokot  LR @ 100ml/hr  PRN: dulcolax, zofran, " "compazine    ANTHROPOMETRICS  Height: 170.2 cm (5' 7\")  Most Recent Weight: 116.8 kg (257 lb 8 oz)    IBW: 61.4 kg  BMI: 40.33 kg/m^2, Obesity Grade III BMI >40  Weight History: 24# (8.5%) wt loss in 5 months-purposeful   Wt Readings from Last 10 Encounters:   06/02/21 116.8 kg (257 lb 8 oz)   04/13/21 115.7 kg (255 lb)   01/29/21 129.7 kg (286 lb)   01/12/21 127.5 kg (281 lb)   01/06/21 127.5 kg (281 lb)   12/12/19 133.8 kg (295 lb)   10/09/19 135.2 kg (298 lb 1.6 oz)   09/18/19 130.6 kg (288 lb)   06/01/18 129.3 kg (285 lb)   05/23/18 130.6 kg (288 lb)     Dosing Weight: 75.2 kg (adjusted using current wt of 116.8 kg and ideal wt of 61.4 kg)    ASSESSED NUTRITION NEEDS  Estimated Energy Needs: 0212-1285 kcals/day (20 - 25 kcals/kg)  Justification: Obese  Estimated Protein Needs:  grams protein/day (1.2 - 1.5 grams of pro/kg)  Justification: Obesity guidelines and Post-op  Estimated Fluid Needs: 1880-2256mL/day (25 - 30 mL/kg)   Justification: Maintenance or Per provider pending fluid status    PHYSICAL FINDINGS  See malnutrition section below.     MALNUTRITION  % Intake: Decreased intake does not meet criteria  % Weight Loss: Weight loss does not meet criteria  Subcutaneous Fat Loss: None observed  Muscle Loss: None observed  Fluid Accumulation/Edema: None noted  Malnutrition Diagnosis: Patient does not meet two of the established criteria necessary for diagnosing malnutrition    NUTRITION DIAGNOSIS  Inadequate oral intake related to decreased appetite and nausea post op as evidenced by no intakes since admit.       INTERVENTIONS  Implementation  Nutrition Education: Discussed role of RD, menu ordering and available snacks/supplemens/ Encouraged adequate intakes to meet needs for post op healing. Discussed requesting PRN antiemetics if nausea occurs when eating. Discussed keto diet as pt stated she will continue once at home. Encouraged more low CHO fruits/veggies.       Goals  Patient to consume % of " nutritionally adequate meal trays TID, or the equivalent with supplements/snacks.     Monitoring/Evaluation  Progress toward goals will be monitored and evaluated per protocol.    Ana Orellana MS, RD, LDN  Unit Pager [phone]

## 2021-06-03 NOTE — PROGRESS NOTES
06/03/21 1000   Quick Adds   Type of Visit Initial PT Evaluation   Living Environment   People in home alone   Current Living Arrangements apartment   Home Accessibility no concerns   Transportation Anticipated family or friend will provide   Self-Care   Usual Activity Tolerance moderate   Current Activity Tolerance fair   Regular Exercise No   Equipment Currently Used at Home none   Disability/Function   Hearing Difficulty or Deaf no   Wear Glasses or Blind yes   Vision Management Glasses   Concentrating, Remembering or Making Decisions Difficulty no   Difficulty Communicating no   Difficulty Eating/Swallowing no   Walking or Climbing Stairs Difficulty no   Dressing/Bathing Difficulty no   Toileting issues no   Doing Errands Independently Difficulty (such as shopping) no   Fall history within last six months no   Change in Functional Status Since Onset of Current Illness/Injury no   General Information   Onset of Illness/Injury or Date of Surgery 06/02/21   Referring Physician Dr DIANE León MD   Patient/Family Therapy Goals Statement (PT) PLOF   Pertinent History of Current Problem (include personal factors and/or comorbidities that impact the POC) Pt is a 66 year old fem s/p T/L fusion with Dr DIANE León.    Existing Precautions/Restrictions spinal   Weight-Bearing Status - LUE weight-bearing as tolerated   Weight-Bearing Status - RUE weight-bearing as tolerated   Weight-Bearing Status - LLE weight-bearing as tolerated   Weight-Bearing Status - RLE weight-bearing as tolerated   Cognition   Orientation Status (Cognition) oriented x 4   Affect/Mental Status (Cognition) WFL   Follows Commands (Cognition) WFL   Integumentary/Edema   Integumentary/Edema no deficits were identifed   Posture    Posture Forward head position;Protracted shoulders;Kyphosis   Strength   Manual Muscle Testing Quick Adds Strength WFL   Bed Mobility   Comment (Bed Mobility) Min A with bed mob   Transfers   Transfer Safety Comments Pt not safe to  attempt transfer   Gait/Stairs (Locomotion)   Comment (Gait/Stairs) Pt not safe to attempt gait    Balance   Balance Comments fair balance at bedside   Sensory Examination   Sensory Perception patient reports no sensory changes   Clinical Impression   Criteria for Skilled Therapeutic Intervention yes, treatment indicated   PT Diagnosis (PT) pain, gait abnormality   Influenced by the following impairments pain and weakness   Functional limitations due to impairments gait, bed mob, and transfers   Clinical Presentation Stable/Uncomplicated   Clinical Presentation Rationale Pt presents as medically diagnosed   Clinical Decision Making (Complexity) low complexity   Therapy Frequency (PT) 2x/day   Predicted Duration of Therapy Intervention (days/wks) 4   Planned Therapy Interventions (PT) bed mobility training;balance training;gait training;transfer training;strengthening;stair training   Anticipated Equipment Needs at Discharge (PT) walker, rolling   Risk & Benefits of therapy have been explained evaluation/treatment results reviewed;care plan/treatment goals reviewed;risks/benefits reviewed;current/potential barriers reviewed;participants voiced agreement with care plan;participants included;patient   PT Discharge Planning    PT Discharge Recommendation (DC Rec) Transitional Care Facility   PT Rationale for DC Rec Pt not able to participate in mobility beyond bed mobility. Will change to a home plan with progress in therapy   PT Brief overview of current status  Imelda for bed mob    Total Evaluation Time   Total Evaluation Time (Minutes) 10

## 2021-06-03 NOTE — PROGRESS NOTES
Patient was seen, course reviewed with nursing staff.    Internal medicine consult from last p.m. reviewed    Notes moderate back pain, otherwise feels well  She denies cough, chest pain, shortness breath, dizziness, abdominal pain, cough      Afebrile  Blood pressure 90s-110s/  Heart rate seventies to eighties  O2 sats upper nineties on low-flow oxygen    Patient is alert, fully oriented, appears comfortable  No facial edema  Lungs clear  CV regular rhythm  Abdomen soft, nontender, nondistended  No lower extremity edema  No flank edema      Results for CARRI ANDERS (MRN [id number]) as of 6/3/2021 08:21   Ref. Range 6/3/2021 06:05   Sodium Latest Ref Range: 133 - 144 mmol/L 134   Potassium Latest Ref Range: 3.4 - 5.3 mmol/L 4.9   Chloride Latest Ref Range: 94 - 109 mmol/L 101   Carbon Dioxide Latest Ref Range: 20 - 32 mmol/L 24   Urea Nitrogen Latest Ref Range: 7 - 30 mg/dL 17   Creatinine Latest Ref Range: 0.52 - 1.04 mg/dL 0.71   GFR Estimate Latest Ref Range: >60 mL/min/1.73_m2 >90   GFR Estimate If Black Latest Ref Range: >60 mL/min/1.73_m2 >90   Calcium Latest Ref Range: 8.5 - 10.1 mg/dL 8.0 (L)   Anion Gap Latest Ref Range: 3 - 14 mmol/L 9   Glucose Latest Ref Range: 70 - 99 mg/dL 137 (H)   WBC Latest Ref Range: 4.0 - 11.0 10e9/L 9.7   Hemoglobin Latest Ref Range: 11.7 - 15.7 g/dL 9.1 (L)   Hematocrit Latest Ref Range: 35.0 - 47.0 % 28.3 (L)   Platelet Count Latest Ref Range: 150 - 450 10e9/L 146 (L)   RBC Count Latest Ref Range: 3.8 - 5.2 10e12/L 3.12 (L)   MCV Latest Ref Range: 78 - 100 fl 91   MCH Latest Ref Range: 26.5 - 33.0 pg 29.2   MCHC Latest Ref Range: 31.5 - 36.5 g/dL 32.2   RDW Latest Ref Range: 10.0 - 15.0 % 13.4   Diff Method Unknown Automated Method   % Neutrophils Latest Units: % 73.0   % Lymphocytes Latest Units: % 16.0   % Monocytes Latest Units: % 10.2   % Eosinophils Latest Units: % 0.0   % Basophils Latest Units: % 0.2   % Immature Granulocytes Latest Units: % 0.6   Nucleated RBCs Latest  Ref Range: 0 /100 0   Absolute Neutrophil Latest Ref Range: 1.6 - 8.3 10e9/L 7.1   Absolute Lymphocytes Latest Ref Range: 0.8 - 5.3 10e9/L 1.6   Absolute Monocytes Latest Ref Range: 0.0 - 1.3 10e9/L 1.0       Assessment    s/p T10-pelvis fusion with TLIF x3 on 6/2 with Dr. León and Dr. Hamilton.     History of hypertension, with relatively low blood pressure postoperatively, likely secondary to anesthesia, blood loss  Prior to mission losartan on hold    Acute blood loss anemia, not clinically significant this time    DAE on CPAP    Plan  Monitor vital signs  Change IV to saline  Recheck hemoglobin early afternoon to assure stability  Continue to hold losartan  Repeat hemoglobin and BMP in a.m.  CPAP  Mechanical compression device for DVT prophylaxis

## 2021-06-03 NOTE — PLAN OF CARE
"      VS: /43   Pulse 72   Temp 97.4  F (36.3  C) (Axillary)   Resp 10   Ht 1.702 m (5' 7\")   Wt 116.8 kg (257 lb 8 oz)   SpO2 98%   BMI 40.33 kg/m     Output: [place] in place, bowel sounds +, flatus -   Lungs: CTA; 3L upon arrival satting > 95%. CPAP @ night w/ O2   Activity: Repositions w/ Ao2; not yet OOB   Skin: WDL ex blanchabnle redness to L cheek, bilateral thighs, chest, and under L & R abdominal folds (offered interdry and mictan- pt refused). Pt also has dry patch under tip of nose.   Pain:   Pain managed with oxy and scheduled tylenol. Using ice packs and repositioning frequently   Neuro/CMS:   A & O x4. Pt voice is hoarse and dry, but speech is spontaneous and logical. Extremities are cool and pale w/ +2 pulses and face is pale as well. Denies numbness and tingling.   Dressing(s):   Aquacell CDI & drain site CDI   Diet:   Advance as tolerated   LDA:   [place], hemovac, PIV L hand SL and R hand infusing @ 85ml/hr    Equipment:   Personal CPAP, IV pole, 2 bags of personal belongings   Plan:   Continue w/ POC; OOB tomorrow, pain management, monitoring O2 sats overnight with use of CPAP   Additional Info:   Pt arrived to unit about 2030.      "

## 2021-06-03 NOTE — PLAN OF CARE
"      VS: /47   Pulse 69   Temp 97.4  F (36.3  C) (Axillary)   Resp 10   Ht 1.702 m (5' 7\")   Wt 116.8 kg (257 lb 8 oz)   SpO2 96%   BMI 40.33 kg/m       Output: [place]: 150 mL   LBM: prior to surgery  HV: minimal   Lungs: Clear bilaterally. 3 L via nasal cannula, CPAP at night.    Activity: Not OOB. Assist x 2 for repositioning/turning.    Skin: WDL ex incision. Pale from surgical blood loss. Blanchable redness on L cheek, bilateral thighs, chest, and L & R abd folds. Dry skin under nose.    Pain:   Managed w/ 5-10 mg Oxy PRN. Ice packs on back.    Neuro/CMS:   A & O x4. Voice is hoarse. Denies N/T. CMS intact. Good strengths.    Dressing(s):   Spine incision/drain - CDI   Diet:   Regular - advance as tolerated   LDA:   [place]: draining  HV: minimal drainage  R PIV - infusing at 85 mL/hr  L PIV - SL   Equipment:   IV pole, CPAP, ice packs   Plan:   Manage pain, promote activity. Monitor urine output. Continue w/ POC.   Additional Info:   EBL: 3 L      "

## 2021-06-03 NOTE — OP NOTE
Procedure Date: 06/02/2021    PREOPERATIVE DIAGNOSES:  1.  Adult degenerative scoliosis.  2.  Lumbar radiculopathy.  3.  Morbid obesity.    POSTOPERATIVE DIAGNOSES:    1.  Adult degenerative scoliosis.  2.  Lumbar radiculopathy.  3.  Morbid obesity.    PROCEDURE PERFORMED:  Please note 22 modifier for the entire procedure with at least twice the normal operative time and technical difficulty due to morbid obesity and severe ankylosis of disk spaces with scoliotic alignment and significant canal and neural foraminal impingement.  1.  T11 through sacrum spinal fusion with local harvest autograft, allograft, and bone morphogenic protein.  2.  T11 through sacrum segmental spinal instrumentation.  3.  Image guidance for spinal instrumentation with O-arm Stealth navigation.  4.  Pelvic fixation.  5.  Three-column osteotomy, L5 to S1 and L2 to L3 with full 3-column release through ankylosed disk segments with release of anterior osteophytes for adequate deformity correction.  6.  L4 to L5 bilateral Smith-Barnes osteotomy.  7.  Transforaminal lumbar interbody fusion, lumbar 2-3, lumbar 4-5, lumbar 5-sacral 1.  8.  Insertion of structural intervertebral device at lumbar 2-3, lumbar 4-5, lumbar 5-sacral 1.      SURGEON:  Gab León MD of Orthopedic Surgery.    CO-SURGEON:  Emely Hamilton MD of Neurosurgery.  Please note that a dual surgeon approach was justified in order to minimize operative time and blood loss in this patient with a complex deformity.    FIRST ASSISTANTS:  Dr. Marcos Burk and Dr. Abdias Cook.  Please note that our residents were not qualified to assist as co-surgeons due to the complexity of the case.    INDICATIONS FOR PROCEDURE:  Madyson Murray is a 66-year-old woman who initially presented to Dr. León with morbid obesity, as well as significant coronal plane deformity from adult degenerative scoliosis with lumbar radiculopathy.  Dr. León had a full discussion of risks versus benefits  for operative intervention.  The patient elected to proceed.    OPERATIVE COURSE    INTRAOPERATIVE FINDINGS:  The patient was identified, and informed consent was verified.  She was taken to the operating room 17 where general endotracheal anesthesia was induced.  Okeefe catheter was placed after the induction of anesthesia.  Perioperative antibiotics were administered within 1 hour of skin incision, and a tranexamic acid bolus and infusion was begun prior to skin incision.  The patient was positioned prone on the ProAxis table, and all extremity points were padded.  The patient's thoracolumbar spine was prepped and draped in standard sterile fashion, and an intraoperative timeout was performed.  A midline skin incision was performed, and careful subperiosteal dissection was carried out from T11 through S2.  We confirmed the levels of operation with fluoroscopy.  Please note that the patient had morbid obesity and had a significant depth to her wound, as well as increased soft tissue for retraction, which added at least twice our normal operative time for exposure, as well as extensive bony overgrowth to her facet joints, necessitating removal of large amounts of her facet joints prior to even placing instrumentation because of overgrowth over the normal pedicle screw starting points.    Once exposure was finally completed again with about twice normal operative time and difficulty, we turned our attention towards placement of instrumentation.  The O-arm was brought into the field in standard sterile fashion, and Stealth was utilized to assist with navigated placement of pedicle screws from T11 through S1 and to place pelvic fixation.  The Wik[employer]ata 5.5 system was utilized to find a starting point, followed by creation of a sharp tract for the screws, followed by undertapping the screws for the pedicle screws and tapping line to line for the pelvic fixation.  The patient had very cortical channels of bone with  very hard quality, and the pelvic fixation actually required a significant amount of strength to tap beyond the normal mechanical tap.  The screw sizes were as follows:  Left T11, 6.5 x 45 mm; right T11, 6.5 x 50; bilateral T12, 6.5 x 50 of which the right was dual headed screw to accommodate the kickstand olinda for coronal deformity correction; left L1, 6.5 x 55 mm; right L1, 6.5 x 50 mm which was dual headed screw again to accommodate kickstand deformity correction olinda; bilateral L2, 6.5 x 50; left L3, 7.5 x 50; right L3, 6.5 x 55; left L4, 7.5 x 55 dual headed; right L4, 7.5 x 50; bilateral L5, 7.5 x 50; bilateral S1, 7.5 x 40 mm; left S2 alar iliac screw 9.5 x 90; left iliac trajectory screw because the patient really did not have adequate sacral wound to accommodate a starting point, which we would need to align the olinda construct, so we placed an iliac screw on the left at 9.5 x 80. On the right side, we placed a 9.5 x 90 S2 alar iliac screw; another 9.5 x 90 iliac trajectory on the right again to accommodate a kickstand olinda for deformity correction.  Instrumentation placement was confirmed via a check CT spin.  We then turned our attention to the deformity correction portion of the operation.    We began our deformity correction at L5 to S1, which had partial ankylosis with large bridging osteophytes.  We performed a standard Smith-Barnes osteotomy first in order to attempt to try to loosen this with a standard TLIF approach.  We resected the interspinous ligament at the base of the lamina and spinous process, the inferior articular facet of the superior articular facets, saving these for local harvest autograft and truncating the superior articular facets flush with the S1 pedicles.  We entered the disk space bilaterally, and we found that the disk space was ankylosed.  We had to use osteotomes to sequentially resect the lateral and anterior osteophytes and then used a combination of insert and rotate  distractors to release the osteophytes to allow the anterior column to become mobile for deformity correction.  We therefore completed a 3-column release from posterior through middle and anterior column to release the L5 to S1 disk space, and then we noted that because of the anterior release of the anterior longitudinal ligament, the area was quite mobile.  We initially trialed and selected 17 mm high x 0-degree lordotic interbody cages because we did not want to obtain any additional lordosis other than what had been achieved by the anterior longitudinal ligament release.  Because of the instability of this level, we elected to then turn our attention to the L4 to L5 level before actually placing the cages so that we did not dislodge the cages when working on the L4 to L5 level because of the significant movement that had been obtained at the L5 to S1 level because of the anterior column release.  So we therefore left the cages out at L5 to S1 until the very end and then proceeded up to L4 to L5 where we performed full bilateral Smith-Barnes osteotomies including resecting interspinous, ligamentum flavum and lamina at the base of the spinous process, the inferior articular facets and the superior articular facets and then while encountering the patient's right lateral recess, there was severe stenosis with severe flattening of the facet over the canal at that level.  We had to perform a really significant decompression there in order to decompress the lateral recess.  While we were doing this, taking care to protect the dura with [place], as well as dissecting a curette from the stenosis there, we encountered an approximate midline dural tear at L4 to L5, which I repaired in primary fashion using 6-0 Prolene suture with a watertight repair.  This was reinforced with Vistaseal fibrin glue at the end of the surgery.  We then entered the disk space first from the right and then from the left and were able to trial  12 x 22 mm 0 degree cages, which we filled with bone morphogenic protein and packed additional local harvest autograft into the disk space.  We performed bilateral TLIFs at this level with bilateral interbodies.  We then moved up to the L2 to L3 level.  We elected to skip the L3 to L4 level because this was already ankylosed and was essentially with flat end plate, so we would not obtain a lot of additional deformity correction with this level.  At L2 to L3, there was also ankylosis laterally and required a 3-column osteotomy to loosen the lateral and anterior osteophytes to mobilize the level for adequate deformity correction.  We started initially with a standard Smith-Barnes osteotomy with again bilateral facet resections of ligamentum flavum, base of lamina and spinous process resections, but this would not adequately mobilize the level before performing the osteotomy through the 3 columns for the ankylosis of the lateral osteophytes.  Once we performed that 3-column osteotomy, we then were able to use a combination of insert and rotate distractors to sequentially dilate the interbody space and create this to become mobile.  We did this on the right side to create more of a coronal plane deformity correction and placed the interbody on the right with additional local harvest autograft in the disk space.  A 0-degree lordotic cage was selected with a 12 mm height and placed on the right at L2 to L3.  We then attempted to reduce the amount of lordotic correction we had obtained at L5 to S1 through the osteotomy, trying to flex the patient through that level.  We eventually were able to reduce the amount of lordosis with capturing it with rods and bending a slight amount of distraction across the rods at those levels.  We placed a kickstand olinda and the dual headed screws at T12 and L1 and distracted across this after connection with the right sided iliac screw.  This distraction also helped us reduced lordosis at  L5 to S1.  At this point, we were able to place the L5 to S1 cages, and they were secure in the disk space.  We then placed our additional rods to achieve the four-olinda construct, took AP and lateral x-rays and were satisfied with the coronal plane correction.  We felt that the sagittal plane correction was as reasonable as possible given the amount of lordosis that had happened at L5 t S1 with the required 3-column release.  We were careful not to overcorrect in the upper lumbar or the thoracolumbar junction in order to accommodate for this.  We completed the four-olinda construct and torqued off the set plugs to the 's recommended torque.  We irrigated the incision copiously, decorticated the remaining posterior elements through T11 and S1 and then performed posterior arthrodesis with local harvest autograft supplemented with allograft and bone Pro[employer] protein.  We achieved hemostasis and verified that there was watertight dural closure.  We placed a Hemovac drain above the fascia, a gram of vancomycin powder in the wound and then closed the incision in multiple layers with absorbable popoff and running sutures and intradermal Monocryl subcuticular stitch on the skin followed by Dermabond.  The patient was then taken to the PACU in stable condition.    I was present for the entire surgery with the exception of the final stages of the skin closure, for which I was immediately available.  I performed all key portions of the procedure alongside Dr. León.  Complication was primary dural tear repaired in primary fashion with watertight seal result.    Yolanda Hamilton MD        D: 2021   T: 2021   MT: [facility]    Name:     CARRI ANDERS  MRN:      [id number]        Account:        [id number]   :      1955           Procedure Date: 2021     Document: [id number]

## 2021-06-03 NOTE — PROGRESS NOTES
PACU to Inpatient Nursing Handoff    Patient Madyson Murray is a 66 year old female who speaks English.   Procedure Procedure(s):  Thoracic 11 to Pelvis posterior instrumented fusion with Lumbar 2 to 4 and 5 to 1  interbody fusion with lee stoddard osteotomy 4-5 and 3 column osteotomy 2-3 and 5-1 with O-Arm/Stealth Navigation, use of allograft, local autograft, bone morphogenic protein,   Surgeon(s) Primary: Gab León MD  Assisting: Yolanda Hamilton MD  Resident - Assisting: Marcos Burk MD; Moody Cook MD     Allergies   Allergen Reactions     Adhesive [Liquid Adhesive]        Isolation  No active isolations     Past Medical History   has a past medical history of Chronic bilateral low back pain without sciatica, Degenerative scoliosis, Lumbar radiculopathy, Obesity, DAE (obstructive sleep apnea), Pulmonary hypertension (H), Tricuspid regurgitation, and Venous (peripheral) insufficiency.    Anesthesia General   Dermatome Level     Preop Meds acetaminophen (Tylenol) - time given: 0614  gabapentin (Neurontin) - time given: 0614   Nerve block Not applicable   Intraop Meds dexamethasone (Decadron)  ketamine (Ketalar): 90 mg given  ondansetron (Zofran): last given at 1637  versed 2 mg, sufentanil gtt, txa gtt, insunil gtt, mag sulfate 2g, CaCl 1g, vaso and norepi gtt   Local Meds No   Antibiotics cefazolin (Ancef) - last given at 1555     Pain Patient Currently in Pain: sleeping, patient not able to self report   PACU meds  acetaminophen (Tylenol): 975 mg (total dose) last given at 1924   fentanyl (Sublimaze): 75 mcg (total dose) last given at 1819   hydromorphone (Dilaudid): 0.3 mg (total dose) last given at 1851   oxycodone (Roxicodone): 5 mg (total dose) last given at 1940   gabapentin 1924   PCA / epidural No   Capnography Respiratory Monitoring (EtCO2): 40 mmHg   Telemetry ECG Rhythm: Normal sinus rhythm   Inpatient Telemetry Monitor Ordered? No        Labs Glucose Lab  Results   Component Value Date     06/02/2021       Hgb Lab Results   Component Value Date    HGB 10.7 06/02/2021       INR Lab Results   Component Value Date    INR 1.17 06/02/2021      PACU Imaging Not applicable     Wound/Incision Incision/Surgical Site 10/30/19 Lower;Mid Back (Active)   Number of days: 581       Incision/Surgical Site 06/02/21 Midline Back (Active)   Incision Assessment UTV 06/02/21 1930   [place]-Incision Assessment UTV 06/02/21 1830   Closure JUSTINA 06/02/21 1930   Incision Drainage Amount None 06/02/21 1930   Dressing Intervention Clean, dry, intact 06/02/21 1930   Number of days: 0       Incision/Surgical Site 06/02/21 Back (Active)   Incision Assessment UTV 06/02/21 1930   [place]-Incision Assessment UTV 06/02/21 1830   Closure JUSTINA 06/02/21 1930   Incision Drainage Amount None 06/02/21 1930   Dressing Intervention Clean, dry, intact 06/02/21 1930   Number of days: 0      CMS        Equipment ice pack   Other LDA       IV Access Peripheral IV 06/02/21 Left Hand (Active)   Site Assessment [facility] 06/02/21 1830   Line Status Infusing 06/02/21 1830   Phlebitis Scale 0-->no symptoms 06/02/21 1830   Infiltration Scale 0 06/02/21 1830   Number of days: 0       Peripheral IV 06/02/21 Right Hand (Active)   Site Assessment [facility] 06/02/21 1830   Line Status Saline locked 06/02/21 1830   Phlebitis Scale 0-->no symptoms 06/02/21 1830   Infiltration Scale 0 06/02/21 1830   Number of days: 0       Arterial Line 06/02/21 (Active)   Site Assessment [facility] 06/02/21 1830   Line Status Pulsatile blood flow 06/02/21 1830   Art Line Waveform Appropriate 06/02/21 1830   Art Line Interventions Leveled;Zeroed and calibrated 06/02/21 1701   Color/Movement/Sensation Capillary refill less than 3 sec 06/02/21 1830   Line Necessity Yes, meets criteria 06/02/21 1830   Dressing Type Transparent 06/02/21 1830   Dressing Status Clean, dry, intact 06/02/21 1830   Number of days: 0      Blood Products Cell saver  Albumin  Plasma Lyte 3L  EBL 3075 mL   Intake/Output Date 06/02/21 0700 - 06/03/21 0659   Shift 0817-6055 7388-6876 3262-1338 24 Hour Total   INTAKE   I.V. 3100 900  4000   Other 640 360  1000   Shift Total(mL/kg) 3740(32.02) 1260(10.79)  5000(42.81)   OUTPUT   Urine 800 300  1100   Blood 2700 375  3075   Shift Total(mL/kg) 3500(29.97) 675(5.78)  4175(35.75)   Weight (kg) 116.8 116.8 116.8 116.8      Drains / Okeefe Closed/Suction Drain 1 Right Back Accordion 10 [place] (Active)   Site Description UTV 06/02/21 1830   Dressing Status Normal: Clean, Dry & Intact 06/02/21 1740   Drainage Appearance Bloody/Bright Red 06/02/21 1830   Status Other (Comment) 06/02/21 1830   Output (ml) 0 ml 06/02/21 2000   Number of days: 0       Urethral Catheter Latex;Straight-tip 16 fr (Active)   Collection Container Standard 06/02/21 1830   Securement Method Securing device (Describe) 06/02/21 1830   Rationale for Continued Use Anesthesia 06/02/21 1830   Urine Output 150 mL 06/02/21 2000   Number of days: 0      Time of void PreOp Void Prior to Procedure: 0600 (06/02/21 0609)    PostOp      Diapered? No   Bladder Scan      mL(water and diet sprite) (06/02/21 2000)  water and applesauce     Vitals    B/P: 110/54  T: 98  F (36.7  C)    Temp src: Axillary  P:  Pulse: 65 (06/02/21 2000)          R: 16  O2:  SpO2: 96 %    O2 Device: Nasal cannula (06/02/21 2000)    Oxygen Delivery: 3 LPM (06/02/21 1830)    FiO2 (%): 3 % (06/02/21 2000)    Family/support present brother updated   Patient belongings  home cpap, duffel and belongings bag w patient   Patient transported on bed and air mat   DC meds/scripts (obs/outpt) Not applicable   Inpatient Pain Meds Released? Yes       Special needs/considerations patient uses cpap at home, may need overnight, dips into the high 80s while sleeping at times. Patient is pale, Hbg checked at 1930- 10.7. Blood sugar last checked at 1900- 127.   Tasks needing completion None       Connie Toth, RN  [facility] [id number]

## 2021-06-03 NOTE — PROGRESS NOTES
"Orthopedic Surgery Progress Note   Amelia 3, 2021    Subjective: No acute events overnight. Pain well controlled. Denies fever or chills, CP, SOB, numbness or tingling, motor dysfunction or weakness. Discussed plan of care, expected post-op recovery. No concerns this morning.    Objective: BP (!) 118/35 (BP Location: Left arm)   Pulse 80   Temp 98.8  F (37.1  C) (Oral)   Resp 20   Ht 1.702 m (5' 7\")   Wt 116.8 kg (257 lb 8 oz)   SpO2 97%   BMI 40.33 kg/m      Drain: 0cc recorded output     General: NAD, alert and oriented, cooperative with exam.   Cardio: RRR, extremities wwp.   Respiratory: Non-labored breathing.  MSK:   Back dressing c/d/i.   Lower extremities:  Motor Strength Right Left   Hip flexion: L1, L2, L3 5/5 5/5   Knee flexion: S1 5/5 5/5   Knee extension: L3, L4 5/5 5/5   Ankle dorsiflexion: L4, L5 5/5 5/5   EHL: L5 5/5 5/5   Ankle plantarflexion: S1 5/5 5/5     Sensation from L3-S1 is preserved.      Labs: Hgb 9.1      Assessment and Plan: Madyson Murray is a 66 year old female now s/p T10-pelvis fusion with TLIF x3 on 6/2 with Dr. León and Dr. Hamilton. Doing well post-operatively.     Plan for Today:  -Continue to work on optimal pain control  -Work with therapies    Ortho Primary  Activity: Up with assist until independent. No excessive bending or twisting. No lifting >10 lbs x 6 weeks. No José lift for transfers.   Weight bearing status: WBAT.  Pain management: Transition from IV to PO as tolerated. No NSAIDs   Antibiotics: Ancef until drain is discontinued  Diet: Begin with clear fluids and progress diet as tolerated.   DVT prophylaxis: SCDs only. No chemical DVT ppx needed.  Imaging: XR Upright Lumbar PTDC - ordered.  Labs: Hgb POD#1.  Bracing/Splinting: None.  Dressings: Keep dressing c/d/i x 7 days then OK to shower.  Drains: Document output per shift, will be discontinued at Orthopedic Surgery discretion.  Okeefe catheter: Remove POD#1.   Physical Therapy/Occupational Therapy: Eval and " treat.  Cultures: None.    Consults: Hospitalist.  Follow-up: Clinic with Dr. León in 2 weeks for wound check and 6 weeks with repeat x-rays.     Disposition: Pending progress with therapies, pain control on orals, and medical stability, anticipate discharge to home vs TCU on POD #4-5.    Marcos Burk MD  Orthopedic Surgery PGY-4  Pager: [phone]

## 2021-06-04 ENCOUNTER — APPOINTMENT (OUTPATIENT)
Dept: PHYSICAL THERAPY | Facility: CLINIC | Age: 66
DRG: 454 | End: 2021-06-04
Attending: ORTHOPAEDIC SURGERY
Payer: COMMERCIAL

## 2021-06-04 ENCOUNTER — APPOINTMENT (OUTPATIENT)
Dept: OCCUPATIONAL THERAPY | Facility: CLINIC | Age: 66
DRG: 454 | End: 2021-06-04
Attending: STUDENT IN AN ORGANIZED HEALTH CARE EDUCATION/TRAINING PROGRAM
Payer: COMMERCIAL

## 2021-06-04 LAB
ALBUMIN SERPL-MCNC: 2.3 G/DL (ref 3.4–5)
ALP SERPL-CCNC: 50 U/L (ref 40–150)
ALT SERPL W P-5'-P-CCNC: 30 U/L (ref 0–50)
ANION GAP SERPL CALCULATED.3IONS-SCNC: 3 MMOL/L (ref 3–14)
AST SERPL W P-5'-P-CCNC: 64 U/L (ref 0–45)
BASOPHILS # BLD AUTO: 0 10E9/L (ref 0–0.2)
BASOPHILS NFR BLD AUTO: 0.3 %
BILIRUB SERPL-MCNC: 1.1 MG/DL (ref 0.2–1.3)
BUN SERPL-MCNC: 8 MG/DL (ref 7–30)
CALCIUM SERPL-MCNC: 8 MG/DL (ref 8.5–10.1)
CHLORIDE SERPL-SCNC: 105 MMOL/L (ref 94–109)
CO2 SERPL-SCNC: 30 MMOL/L (ref 20–32)
CREAT SERPL-MCNC: 0.56 MG/DL (ref 0.52–1.04)
DIFFERENTIAL METHOD BLD: ABNORMAL
EOSINOPHIL # BLD AUTO: 0 10E9/L (ref 0–0.7)
EOSINOPHIL NFR BLD AUTO: 0.2 %
ERYTHROCYTE [DISTWIDTH] IN BLOOD BY AUTOMATED COUNT: 13.2 % (ref 10–15)
GFR SERPL CREATININE-BSD FRML MDRD: >90 ML/MIN/{1.73_M2}
GLUCOSE SERPL-MCNC: 108 MG/DL (ref 70–99)
HCT VFR BLD AUTO: 22.3 % (ref 35–47)
HGB BLD-MCNC: 7.3 G/DL (ref 11.7–15.7)
HGB BLD-MCNC: 7.5 G/DL (ref 11.7–15.7)
IMM GRANULOCYTES # BLD: 0 10E9/L (ref 0–0.4)
IMM GRANULOCYTES NFR BLD: 0.6 %
LYMPHOCYTES # BLD AUTO: 1 10E9/L (ref 0.8–5.3)
LYMPHOCYTES NFR BLD AUTO: 14.6 %
MCH RBC QN AUTO: 29.3 PG (ref 26.5–33)
MCHC RBC AUTO-ENTMCNC: 32.7 G/DL (ref 31.5–36.5)
MCV RBC AUTO: 90 FL (ref 78–100)
MONOCYTES # BLD AUTO: 0.7 10E9/L (ref 0–1.3)
MONOCYTES NFR BLD AUTO: 10.5 %
NEUTROPHILS # BLD AUTO: 4.9 10E9/L (ref 1.6–8.3)
NEUTROPHILS NFR BLD AUTO: 73.8 %
NRBC # BLD AUTO: 0 10*3/UL
NRBC BLD AUTO-RTO: 0 /100
PLATELET # BLD AUTO: 111 10E9/L (ref 150–450)
POTASSIUM SERPL-SCNC: 4.2 MMOL/L (ref 3.4–5.3)
PROT SERPL-MCNC: 4.9 G/DL (ref 6.8–8.8)
RBC # BLD AUTO: 2.49 10E12/L (ref 3.8–5.2)
SODIUM SERPL-SCNC: 138 MMOL/L (ref 133–144)
WBC # BLD AUTO: 6.6 10E9/L (ref 4–11)

## 2021-06-04 PROCEDURE — 120N000002 HC R&B MED SURG/OB UMMC

## 2021-06-04 PROCEDURE — 97165 OT EVAL LOW COMPLEX 30 MIN: CPT | Mod: GO

## 2021-06-04 PROCEDURE — 97530 THERAPEUTIC ACTIVITIES: CPT | Mod: GO

## 2021-06-04 PROCEDURE — 250N000013 HC RX MED GY IP 250 OP 250 PS 637: Performed by: STUDENT IN AN ORGANIZED HEALTH CARE EDUCATION/TRAINING PROGRAM

## 2021-06-04 PROCEDURE — 97535 SELF CARE MNGMENT TRAINING: CPT | Mod: GO

## 2021-06-04 PROCEDURE — 97530 THERAPEUTIC ACTIVITIES: CPT | Mod: GP | Performed by: PHYSICAL THERAPIST

## 2021-06-04 PROCEDURE — 99232 SBSQ HOSP IP/OBS MODERATE 35: CPT | Performed by: INTERNAL MEDICINE

## 2021-06-04 PROCEDURE — 97116 GAIT TRAINING THERAPY: CPT | Mod: GP | Performed by: PHYSICAL THERAPIST

## 2021-06-04 PROCEDURE — 36415 COLL VENOUS BLD VENIPUNCTURE: CPT | Performed by: INTERNAL MEDICINE

## 2021-06-04 PROCEDURE — 85018 HEMOGLOBIN: CPT | Performed by: INTERNAL MEDICINE

## 2021-06-04 PROCEDURE — 97110 THERAPEUTIC EXERCISES: CPT | Mod: GP | Performed by: PHYSICAL THERAPIST

## 2021-06-04 PROCEDURE — 85025 COMPLETE CBC W/AUTO DIFF WBC: CPT | Performed by: INTERNAL MEDICINE

## 2021-06-04 PROCEDURE — 250N000011 HC RX IP 250 OP 636: Performed by: STUDENT IN AN ORGANIZED HEALTH CARE EDUCATION/TRAINING PROGRAM

## 2021-06-04 PROCEDURE — 80053 COMPREHEN METABOLIC PANEL: CPT | Performed by: INTERNAL MEDICINE

## 2021-06-04 RX ORDER — SODIUM CHLORIDE 9 MG/ML
INJECTION, SOLUTION INTRAVENOUS
Status: DISPENSED
Start: 2021-06-04 | End: 2021-06-05

## 2021-06-04 RX ADMIN — METHOCARBAMOL 750 MG: 750 TABLET ORAL at 07:56

## 2021-06-04 RX ADMIN — FAMOTIDINE 20 MG: 20 TABLET ORAL at 19:28

## 2021-06-04 RX ADMIN — GABAPENTIN 300 MG: 300 CAPSULE ORAL at 19:28

## 2021-06-04 RX ADMIN — OXYCODONE HYDROCHLORIDE 10 MG: 5 TABLET ORAL at 05:39

## 2021-06-04 RX ADMIN — ACETAMINOPHEN 975 MG: 325 TABLET, FILM COATED ORAL at 19:28

## 2021-06-04 RX ADMIN — METHOCARBAMOL 750 MG: 750 TABLET ORAL at 14:36

## 2021-06-04 RX ADMIN — Medication 2 G: at 16:59

## 2021-06-04 RX ADMIN — OXYCODONE HYDROCHLORIDE 10 MG: 5 TABLET ORAL at 08:34

## 2021-06-04 RX ADMIN — OXYCODONE HYDROCHLORIDE 5 MG: 5 TABLET ORAL at 22:51

## 2021-06-04 RX ADMIN — ACETAMINOPHEN 975 MG: 325 TABLET, FILM COATED ORAL at 03:01

## 2021-06-04 RX ADMIN — OXYCODONE HYDROCHLORIDE 10 MG: 5 TABLET ORAL at 14:37

## 2021-06-04 RX ADMIN — FAMOTIDINE 20 MG: 20 TABLET ORAL at 07:51

## 2021-06-04 RX ADMIN — ACETAMINOPHEN 975 MG: 325 TABLET, FILM COATED ORAL at 10:04

## 2021-06-04 RX ADMIN — DOCUSATE SODIUM 50MG AND SENNOSIDES 8.6MG 2 TABLET: 8.6; 5 TABLET, FILM COATED ORAL at 19:28

## 2021-06-04 RX ADMIN — POLYETHYLENE GLYCOL 3350 17 G: 17 POWDER, FOR SOLUTION ORAL at 07:51

## 2021-06-04 RX ADMIN — METHOCARBAMOL 750 MG: 750 TABLET ORAL at 00:01

## 2021-06-04 RX ADMIN — DOCUSATE SODIUM 50MG AND SENNOSIDES 8.6MG 2 TABLET: 8.6; 5 TABLET, FILM COATED ORAL at 07:51

## 2021-06-04 RX ADMIN — GABAPENTIN 300 MG: 300 CAPSULE ORAL at 07:51

## 2021-06-04 RX ADMIN — Medication 2 G: at 08:35

## 2021-06-04 RX ADMIN — OXYCODONE HYDROCHLORIDE 10 MG: 5 TABLET ORAL at 19:28

## 2021-06-04 ASSESSMENT — ACTIVITIES OF DAILY LIVING (ADL): IADL_COMMENTS: WILL HAVE ASSIST WITH IADLS

## 2021-06-04 NOTE — PROGRESS NOTES
"Orthopedic Surgery Progress Note   Amelia 3, 2021    Subjective: No acute events overnight. Pain adequately controlled. Having some subjective fevers, no objectively elevated temps recorded. No new numbness/tingling in legs. Encouraged out-of-bed activity with PT today.     Objective: BP (!) 97/38 (BP Location: Left arm)   Pulse 93   Temp 99.5  F (37.5  C) (Axillary)   Resp 24   Ht 1.702 m (5' 7\")   Wt 116.8 kg (257 lb 8 oz)   SpO2 95%   BMI 40.33 kg/m      Drain: 90 -> 30cc last 2 shifts     General: NAD, alert and oriented, cooperative with exam.   Cardio: RRR, extremities wwp.   Respiratory: Non-labored breathing.  MSK:   Back dressing c/d/i.   Lower extremities:  Motor Strength Right Left   Hip flexion: L1, L2, L3 5/5 5/5   Knee flexion: S1 5/5 5/5   Knee extension: L3, L4 5/5 5/5   Ankle dorsiflexion: L4, L5 5/5 5/5   EHL: L5 5/5 5/5   Ankle plantarflexion: S1 5/5 5/5     Sensation from L3-S1 is preserved.    Labs: Hgb 8.8      Assessment and Plan: Madyson Murray is a 66 year old female now s/p T10-pelvis fusion with TLIF x3 on 6/2 with Dr. León and Dr. Hamilton. Doing well post-operatively.     Plan for Today:  -Continue to maintain optimal pain control  -Work with therapies today as able  -Upright XRs when able    Ortho Primary  Activity: Up with assist until independent. No excessive bending or twisting. No lifting >10 lbs x 6 weeks. No José lift for transfers.   Weight bearing status: WBAT.  Pain management: Transition from IV to PO as tolerated. No NSAIDs   Antibiotics: Ancef until drain is discontinued  Diet: Begin with clear fluids and progress diet as tolerated.   DVT prophylaxis: SCDs only. No chemical DVT ppx needed.  Imaging: XR Upright Lumbar PTDC - ordered.  Labs: Hgb POD#1.  Bracing/Splinting: None.  Dressings: Keep dressing c/d/i x 7 days then OK to shower.  Drains: Document output per shift, will be discontinued at Orthopedic Surgery discretion.  Okeefe catheter: Remove POD#1.   Physical " Therapy/Occupational Therapy: Eval and treat.  Cultures: None.    Consults: Hospitalist.  Follow-up: Clinic with Dr. León in 2 weeks for wound check and 6 weeks with repeat x-rays.     Disposition: Pending progress with therapies, pain control on orals, and medical stability, anticipate discharge to home vs TCU on POD #4-5.    Marcos Burk MD  Orthopedic Surgery PGY-4  Pager: [phone]

## 2021-06-04 NOTE — PLAN OF CARE
"      VS: /52   Pulse 91   Temp 100.3  F (37.9  C) (Oral)   Resp 18   Ht 1.702 m (5' 7\")   Wt 116.8 kg (257 lb 8 oz)   SpO2 97%   BMI 40.33 kg/m    VSS on RA denies CP or SOB     Output: Okee[place] Catheter in place- adequate output. LBM 6/2/21 passing flatus, BS active    Lungs: LS clear   Activity: Assist 1-2 w/ gb & Walker, log rolling while in bed   Walked in hallways w/ pt & sat in chair this AM    Skin: Intact Ex; incision  Redness under breasts and skin folds    Pain:   Managed w/ tylenol, PRN Robaxin and Oxycodone  Using ice packs and repositioning    Neuro/CMS:   A&Ox4, CMS intact    Dressing(s):   CDI    Diet:   Regular- tolerating   Denies N/V    LDA:   L & R PIV SL   Hemovac  Okeefe    Equipment:   Walker, GB, IS, PCDs   Plan:   Continue POC, manage pain, encourage therapies/ambulation    Additional Info:   Pt able to make needs known, call light within reach        "

## 2021-06-04 NOTE — PHARMACY-ADMISSION MEDICATION HISTORY
Admission Medication History Completed by Pharmacy    See [facility] Admission Navigator for allergy information, preferred outpatient pharmacy, prior to admission medications and immunization status.     Medication History Sources:     Patient    Sure Scripts    Changes made to PTA medication list (reason):    Added: None    Deleted: None    Changed: None    Additional Information:    Medications were recently filled.    Prior to Admission medications    Medication Sig Last Dose Taking? Auth Provider   acetaminophen (TYLENOL) 500 MG tablet Take 2 tablets (1,000 mg) by mouth every 8 hours as needed for mild pain Past Week at Unknown time Yes Ga Ken Jr., MD   losartan (COZAAR) 25 MG tablet Take 2 tablets (50 mg) by mouth daily 6/2/2021 at 0300 Yes Ga Ken Jr., MD   meloxicam (MOBIC) 15 MG tablet Take 1 tablet (15 mg) by mouth daily as needed Take with food 5/23/2021  Ga Ken Jr., MD       Date completed: 06/04/21    Medication history completed by: Emely Trinidad, VeronicaD, BCPS

## 2021-06-04 NOTE — PLAN OF CARE
"  VS:   BP (!) 97/38 (BP Location: Left arm)   Pulse 93   Temp 99.5  F (37.5  C) (Axillary)   Resp 24   Ht 1.702 m (5' 7\")   Wt 116.8 kg (257 lb 8 oz)   SpO2 95%   BMI 40.33 kg/m    1.5 L o2   Output:   Putnam with adequate output. No BM per shift. Hypoactive bowel sounds.   Strict I&O's.    Activity:   Turn q 2 hours when patient allows.    Skin: Spine incision.     Pain:   Pain managed with robaxin and scheduled tylenol.    Neuro/CMS:   A&Ox4   Dressing(s):   CDI   Diet:   Regular. Poor appetite.     LDA:   PIV, Hemovac, putnam, CPAP   Equipment:   IV pole, PCD's, CPAP (home supply)   Plan:   Continue to encourage & reposition patient, monitor plan of care.    Additional Info:          "

## 2021-06-04 NOTE — PROGRESS NOTES
Patient was seen, course reviewed with team.    Patient notes gradual improving back pain.  She denies cough, chest pain, shortness of breath.  Appetite poor.  + Flatus, no bowel movement    Temperature 100.3 degrees this morning vital signs stable  O2 sats upper 90s on low-flow oxygen    Patient is lying in bed on her right side, appears comfortable.  She is fully oriented  Respiratory 12, unlabored  Lungs clear  CV regular rhythm  Abdomen soft, distended, positive bowel sounds  No lower extremity edema    Results for CARRI ANDERS (MRN [id number]) as of 6/4/2021 10:22   Ref. Range 6/4/2021 08:03   Sodium Latest Ref Range: 133 - 144 mmol/L 138   Potassium Latest Ref Range: 3.4 - 5.3 mmol/L 4.2   Chloride Latest Ref Range: 94 - 109 mmol/L 105   Carbon Dioxide Latest Ref Range: 20 - 32 mmol/L 30   Urea Nitrogen Latest Ref Range: 7 - 30 mg/dL 8   Creatinine Latest Ref Range: 0.52 - 1.04 mg/dL 0.56   GFR Estimate Latest Ref Range: >60 mL/min/1.73_m2 >90   GFR Estimate If Black Latest Ref Range: >60 mL/min/1.73_m2 >90   Calcium Latest Ref Range: 8.5 - 10.1 mg/dL 8.0 (L)   Anion Gap Latest Ref Range: 3 - 14 mmol/L 3   Albumin Latest Ref Range: 3.4 - 5.0 g/dL 2.3 (L)   Protein Total Latest Ref Range: 6.8 - 8.8 g/dL 4.9 (L)   Bilirubin Total Latest Ref Range: 0.2 - 1.3 mg/dL 1.1   Alkaline Phosphatase Latest Ref Range: 40 - 150 U/L 50   ALT Latest Ref Range: 0 - 50 U/L 30   AST Latest Ref Range: 0 - 45 U/L 64 (H)   Glucose Latest Ref Range: 70 - 99 mg/dL 108 (H)   WBC Latest Ref Range: 4.0 - 11.0 10e9/L 6.6   Hemoglobin Latest Ref Range: 11.7 - 15.7 g/dL 7.3 (L)   Hematocrit Latest Ref Range: 35.0 - 47.0 % 22.3 (L)   Platelet Count Latest Ref Range: 150 - 450 10e9/L 111 (L)   RBC Count Latest Ref Range: 3.8 - 5.2 10e12/L 2.49 (L)   MCV Latest Ref Range: 78 - 100 fl 90   MCH Latest Ref Range: 26.5 - 33.0 pg 29.3   MCHC Latest Ref Range: 31.5 - 36.5 g/dL 32.7   RDW Latest Ref Range: 10.0 - 15.0 % 13.2   Diff Method Unknown  Automated Method   % Neutrophils Latest Units: % 73.8   % Lymphocytes Latest Units: % 14.6       Assessment/plam    s/p T10-pelvis fusion with TLIF x3 on 6/2 with Dr. León and Dr. Hamilton.   Pain control improving.  Plan: Per surgery.    Low-grade fever, likely secondary to inflammation from surgery versus atelectasis  Plan: Monitor vital signs.  Encourage incentive spirometry and mobility     History of hypertension, with relatively low blood pressure postoperatively, likely secondary to blood loss  Vitals stable  Prior to admission losartan remains on hold     Acute blood loss anemia, with approximate 2-1/2 g drop in hemoglobin over the last 48 hours.  Suspect dilutional factor   Plan: Repeat hemoglobin later today to assure stability    DAE on CPAP     DVT prophylaxis, NING

## 2021-06-04 NOTE — PROGRESS NOTES
Acupuncture Clinical Internship Intake and Treatment Documentation   [facility]    Date:  6/4/2021  Patient Name:  Madyson Murray   YOB: 1955     Repeat Patient:  no  Has patient had acupoint/acupressure treatment before:  yes    Signed consent placed in the medical record:  yes  Patient/Family verbalizes understanding of risks and benefits:  yes  Required information provided to patient:  yes    Diagnosis:  Degenerative scoliosis in adult patient [M41.80]  Lumbar radiculopathy [M54.16]    Patient condition and treatment:  Back pain, acupuncture  Reason for Intervention Today/Chief Complaint:  Back pain, acupuncture    Isolation:  No  Type:  None    PRE-SCORE:  severe    Other Western medical information:  None    Medications   Current Facility-Administered Medications:      [START ON 6/5/2021] acetaminophen (TYLENOL) tablet 650 mg, 650 mg, Oral, Q4H PRN, Marcos Burk MD     acetaminophen (TYLENOL) tablet 975 mg, 975 mg, Oral, Q8H, Marcos Burk MD, 975 mg at 06/04/21 1004     benzocaine-menthol (CEPACOL) 15-3.6 MG lozenge 1 lozenge, 1 lozenge, Buccal, Q1H PRN, Marcos Burk MD     bisacodyl (DULCOLAX) Suppository 10 mg, 10 mg, Rectal, Daily PRN, Marcos Burk MD     ceFAZolin (ANCEF) intermittent infusion 2 g in 100 mL dextrose PRE-MIX, 2 g, Intravenous, Q8H, Marcos Burk MD, Last Rate: 200 mL/hr at 06/04/21 0835, 2 g at 06/04/21 0835     famotidine (PEPCID) tablet 20 mg, 20 mg, Oral, BID, 20 mg at 06/04/21 0751 **OR** famotidine (PEPCID) infusion 20 mg, 20 mg, Intravenous, BID, Marcos Burk MD     gabapentin (NEURONTIN) capsule 300 mg, 300 mg, Oral, BID, Marcos Burk MD, 300 mg at 06/04/21 0751     HYDROmorphone (PF) (DILAUDID) injection 0.2 mg, 0.2 mg, Intravenous, Q2H PRN, Marcos Burk MD     lactated ringers infusion, , Intravenous, Continuous, Jimmie Trinidad MD, Last Rate: 100 mL/hr at 06/03/21 2016, New Bag at 06/03/21 2016      lidocaine (LMX4) cream, , Topical, Q1H PRN, Marcos Burk MD     lidocaine 1 % 0.1-1 mL, 0.1-1 mL, Other, Q1H PRN, Marcos Burk MD     losartan (COZAAR) tablet 50 mg, 50 mg, Oral, HOLD, Jf Ramirez MD     methocarbamol (ROBAXIN) tablet 750 mg, 750 mg, Oral, 4x Daily PRN, Marcos Burk MD, 750 mg at 06/04/21 1436     naloxone (NARCAN) injection 0.2 mg, 0.2 mg, Intravenous, Q2 Min PRN, Marcos Burk MD     naloxone (NARCAN) injection 0.2 mg, 0.2 mg, Intramuscular, Q2 Min PRN, Marcos Burk MD     naloxone (NARCAN) injection 0.4 mg, 0.4 mg, Intravenous, Q2 Min PRN, Marcos Burk MD     naloxone (NARCAN) injection 0.4 mg, 0.4 mg, Intramuscular, Q2 Min PRN, Marcos Burk MD     ondansetron (ZOFRAN-ODT) ODT tab 4 mg, 4 mg, Oral, Q6H PRN **OR** ondansetron (ZOFRAN) injection 4 mg, 4 mg, Intravenous, Q6H PRN, Marocs Burk MD     oxyCODONE (ROXICODONE) tablet 5-10 mg, 5-10 mg, Oral, Q3H PRN, Marcos Burk MD, 10 mg at 06/04/21 1437     polyethylene glycol (MIRALAX) Packet 17 g, 17 g, Oral, Daily, Marcos Burk MD, 17 g at 06/04/21 0751     prochlorperazine (COMPAZINE) injection 10 mg, 10 mg, Intravenous, Q6H PRN **OR** prochlorperazine (COMPAZINE) tablet 10 mg, 10 mg, Oral, Q6H PRN, Marcos Burk MD     senna-docusate (SENOKOT-S/PERICOLACE) 8.6-50 MG per tablet 2 tablet, 2 tablet, Oral, BID, Marcos Burk MD, 2 tablet at 06/04/21 0751     sodium chloride (PF) 0.9% PF flush 3 mL, 3 mL, Intracatheter, Q8H, Marcos Burk MD, 3 mL at 06/02/21 2227     sodium chloride (PF) 0.9% PF flush 3 mL, 3 mL, Intracatheter, q1 min prn, Marcos Burk MD     SUFentanil (SUFENTA) 100 mcg in sodium chloride 0.9 % 50 mL infusion, 0.2 mcg/kg/hr (Ideal), Intravenous, Continuous, Marcos Burk MD, Stopped at 06/02/21 1609     tranexamic acid (CYKLOKAPRON) 5 g in sodium chloride 0.9 % 250 mL infusion, 10 mg/kg/hr, Intravenous, Continuous, Marcos Burk MD, Stopped at 06/02/21  1622    No current outpatient medications on file.     Current Facility-Administered Medications:      [START ON 6/5/2021] acetaminophen (TYLENOL) tablet 650 mg, 650 mg, Oral, Q4H PRN, Marcos Burk MD     acetaminophen (TYLENOL) tablet 975 mg, 975 mg, Oral, Q8H, Marcos Burk MD, 975 mg at 06/04/21 1004     benzocaine-menthol (CEPACOL) 15-3.6 MG lozenge 1 lozenge, 1 lozenge, Buccal, Q1H PRN, Marcos Burk MD     bisacodyl (DULCOLAX) Suppository 10 mg, 10 mg, Rectal, Daily PRN, Marcos Burk MD     ceFAZolin (ANCEF) intermittent infusion 2 g in 100 mL dextrose PRE-MIX, 2 g, Intravenous, Q8H, Marcos Burk MD, Last Rate: 200 mL/hr at 06/04/21 0835, 2 g at 06/04/21 0835     famotidine (PEPCID) tablet 20 mg, 20 mg, Oral, BID, 20 mg at 06/04/21 0751 **OR** famotidine (PEPCID) infusion 20 mg, 20 mg, Intravenous, BID, Marcos Burk MD     gabapentin (NEURONTIN) capsule 300 mg, 300 mg, Oral, BID, Marcos Burk MD, 300 mg at 06/04/21 0751     HYDROmorphone (PF) (DILAUDID) injection 0.2 mg, 0.2 mg, Intravenous, Q2H PRN, Marcos Burk MD     lactated ringers infusion, , Intravenous, Continuous, Jimmie Trinidad MD, Last Rate: 100 mL/hr at 06/03/21 2016, New Bag at 06/03/21 2016     lidocaine (LMX4) cream, , Topical, Q1H PRN, Marcos Burk MD     lidocaine 1 % 0.1-1 mL, 0.1-1 mL, Other, Q1H PRN, Marcos Burk MD     losartan (COZAAR) tablet 50 mg, 50 mg, Oral, HOLD, Jf Ramirez MD     methocarbamol (ROBAXIN) tablet 750 mg, 750 mg, Oral, 4x Daily PRN, Marcos Burk MD, 750 mg at 06/04/21 1436     naloxone (NARCAN) injection 0.2 mg, 0.2 mg, Intravenous, Q2 Min PRN, Marcos Burk MD     naloxone (NARCAN) injection 0.2 mg, 0.2 mg, Intramuscular, Q2 Min PRN, Marcos Burk MD     naloxone (NARCAN) injection 0.4 mg, 0.4 mg, Intravenous, Q2 Min PRN, Marcos Burk MD     naloxone (NARCAN) injection 0.4 mg, 0.4 mg, Intramuscular, Q2 Min PRN, Marcos Burk MD      ondansetron (ZOFRAN-ODT) ODT tab 4 mg, 4 mg, Oral, Q6H PRN **OR** ondansetron (ZOFRAN) injection 4 mg, 4 mg, Intravenous, Q6H PRN, Marocs Burk MD     oxyCODONE (ROXICODONE) tablet 5-10 mg, 5-10 mg, Oral, Q3H PRN, Marcos Burk MD, 10 mg at 06/04/21 1437     polyethylene glycol (MIRALAX) Packet 17 g, 17 g, Oral, Daily, Marcos Burk MD, 17 g at 06/04/21 0751     prochlorperazine (COMPAZINE) injection 10 mg, 10 mg, Intravenous, Q6H PRN **OR** prochlorperazine (COMPAZINE) tablet 10 mg, 10 mg, Oral, Q6H PRN, Marcos Burk MD     senna-docusate (SENOKOT-S/PERICOLACE) 8.6-50 MG per tablet 2 tablet, 2 tablet, Oral, BID, Mracos Burk MD, 2 tablet at 06/04/21 0751     sodium chloride (PF) 0.9% PF flush 3 mL, 3 mL, Intracatheter, Q8H, Marcos Burk MD, 3 mL at 06/02/21 2227     sodium chloride (PF) 0.9% PF flush 3 mL, 3 mL, Intracatheter, q1 min prn, Marcos Burk MD     SUFentanil (SUFENTA) 100 mcg in sodium chloride 0.9 % 50 mL infusion, 0.2 mcg/kg/hr (Ideal), Intravenous, Continuous, Marcos Burk MD, Stopped at 06/02/21 1609     tranexamic acid (CYKLOKAPRON) 5 g in sodium chloride 0.9 % 250 mL infusion, 10 mg/kg/hr, Intravenous, Continuous, Marcos Burk MD, Stopped at 06/02/21 1622    Pre-Treatment Assessment  Chief Complaint/ Reason for Intervention Today:  Back pain  Chief Complaint Pre-Score:  severe   Describe:  Back pain along whole spine, does not radiate down legs  Pain Location:  Along whole spine  Pre Session Pain:  Severe  Pre Session Anxiety:  None  Pre Session Nausea:  None    10 Traditional Chinese Medicine Assessment Questions  - Cold/ Heat:  Feels warm, pt. Says she has a temp as well  - Sweat:  Night sweats, no spontaneous sweat  - Headaches/Body aches:  No headache, back pain. Had scoliosis surgery 2 days ago.  - Chest/Abdomen:  No chest oppression, no shortness of breath, no heart palpitations  - Digestion:  Digestion is good. Has reduced appetite. Has hardly eaten since  the surgery.  - Bowel Movement/Urination: Good.  No loose stool, no constipation, no frequent urination, no dark urine.  - Hearing/Vision:  No concerns.  - Sleep (prior to hospital):  Has been poor since being in the hospital. Falls asleep ok. Wakes up a lot.  - Energy:  Ok.  - Emotions:  Good.  - Ob Gyn:  No concerns.  - Miscellaneous:  None    Traditional Chinese Medicine Assessment  - TONGUE:  N/A - Covid  - PULSE:  Rapid, moderate  - OBSERVATIONS:  none     Traditional Chinese Medicine Diagnosis  - BRANCH:  Back pain due to trauma, caused Qi & Blood stagnation in Du & UB  - ROOT:  Underlying K yin and blood deficiency     Traditional Chinese Medicine Treatment  - ACUPUNCTURE:  (L) ear battlefield, (L) ear lumbar, thoracic and cervical, (L) UB-62, (L) UB-60, (R) K3, (R) K6, (L) scalp balance point   - NEEDLE COUNT: In: 13   Out: 13    Time In: 20 minutes     Magnet informed consent signed and given:  no    Post Treatment Assessment  Chief complaint post score:  same  Post Session Observation:  Patient seems relaxed  Patient/Family Education:  Yes - inquired how often a person should get acupuncture  Verbal information provided:  yes  Written information provided:  no  All questions answered at time of treatment:  yes    Treatment/Procedure(s) performed by:  Jyothi Rothman    Date: 6/4/2021     I attest that this acupuncture treatment was done under my supervision and this note is complete and true.     Supervising acupuncturist:  Yordy Montoya LAc [facility] license #: [id number]  P: [phone]

## 2021-06-04 NOTE — PLAN OF CARE
"  VS:    /57 (BP Location: Left arm)   Pulse 77   Temp 96.7  F (35.9  C) (Axillary)   Resp 24   Ht 1.702 m (5' 7\")   Wt 116.8 kg (257 lb 8 oz)   SpO2 95%   BMI 40.33 kg/m      Output:    Voiding large amount via putnam, hemovac.  BS hypoactive, still not passing gas.    Activity:     Pt still has not gotten OOB. Refused positioning initially, but then allowed q2  turning log roll from side to side with assist of 1-2. Weaned off O2 this afternoon sating 94-97%, but then needed 1L at bedtime to keep sats up. BP still soft, but stable   Skin: Spinal incision, redness under breasts and abd fold.    Pain:    Pain managed well with prn oxy/robaxin x1 this evening and scheduled tylenol.    Neuro/CMS:    A&Ox4, denies numbness/tingling. Neuros/cms intact.    Dressing(s):     Guaze and Tegaderm CDI on spine.    Diet:    Regular diet, poor appetite but did eat some yogurt and fruit for dinner. Denies nausea this evening.    Equipment:     IV pole, pcds, home cpap.    IV's/Drains:    PIV infusing 100 ml/hr, hemovac, putnam.   Plan:    Continue managing pain, IS use, and encourage activity. Pt is able to make needs known and calls appropriately.    Additional Info:                 "

## 2021-06-04 NOTE — PROGRESS NOTES
"   06/04/21 0900   Quick Adds   Type of Visit Initial Occupational Therapy Evaluation   Living Environment   People in home alone   Current Living Arrangements apartment   Home Accessibility no concerns   Transportation Anticipated family or friend will provide   Living Environment Comments Will have friends and neighbors that will assist. Has higher toilet and RTS if needed. Tub/shower with grab bar.    Self-Care   Usual Activity Tolerance moderate   Current Activity Tolerance fair   Regular Exercise No   Equipment Currently Used at Home none   Disability/Function   Hearing Difficulty or Deaf no   Wear Glasses or Blind yes   Vision Management glasses   Concentrating, Remembering or Making Decisions Difficulty no   Difficulty Communicating no   Difficulty Eating/Swallowing no   Walking or Climbing Stairs Difficulty no   Dressing/Bathing Difficulty no   Toileting issues no   Doing Errands Independently Difficulty (such as shopping) no   Fall history within last six months no   General Information   Onset of Illness/Injury or Date of Surgery 06/02/21   Referring Physician Marcos Burk MD   Patient/Family Therapy Goal Statement (OT) \"To be able to walk straight, to be able to go shopping\"   Additional Occupational Profile Info/Pertinent History of Current Problem s/p T10-pelvis fusion with TLIF x3 on 6/2 with Dr. León and Dr. Hamilton.   Existing Precautions/Restrictions fall;spinal   Left Upper Extremity (Weight-bearing Status) partial weight-bearing (PWB)  (10 lbs)   Right Upper Extremity (Weight-bearing Status) partial weight-bearing (PWB)  (10 lbs)   Cognitive Status Examination   Orientation Status orientation to person, place and time   Affect/Mental Status (Cognitive) WNL   Follows Commands WNL   Visual Perception   Visual Impairment/Limitations corrective lenses full-time   Sensory   Sensory Quick Adds No deficits were identified   Pain Assessment   Patient Currently in Pain Yes, see Vital Sign flowsheet "   Range of Motion Comprehensive   General Range of Motion no range of motion deficits identified   Strength Comprehensive (MMT)   General Manual Muscle Testing (MMT) Assessment no strength deficits identified   Muscle Tone Assessment   Muscle Tone Quick Adds No deficits were identified   Coordination   Upper Extremity Coordination No deficits were identified   Bed Mobility   Comment (Bed Mobility) Supine>sit using log roll with CGA   Transfer Skill: Bed to Chair/Chair to Bed   Bed-Chair [place] (Transfers) set up;verbal cues;minimum assist (75% patient effort)   Assistive Device (Bed-Chair Transfers) rolling walker   Transfer Comments had LOB when about to sit in chair, needed min A to safely lower into chair   Sit-Stand Transfer   Sit-Stand [place]ner (Transfers) contact guard;set up;verbal cues   Assistive Device (Sit-Stand Transfers) walker, front-wheeled   Lower Body Dressing Assessment/Training   Faulkner Level (Lower Body Dressing) supervision;verbal cues;set up  (to don socks)   Assistive Devices (Lower Body Dressing) sock-aid   Comment (Lower Body Dressing) needed 2 vc's not to bend forward while using AE for socks    Instrumental Activities of Daily Living (IADL)   IADL Comments will have assist with IADLs   Clinical Impression   Criteria for Skilled Therapeutic Interventions Met (OT) yes   OT Diagnosis Decreased functional mobility and ADLs/IADLs   OT Problem List-Impairments impacting ADL balance;strength;pain;post-surgical precautions   Assessment of Occupational Performance 3-5 Performance Deficits   Identified Performance Deficits dressing, bathing, toileting, transfers, home mgmt   Planned Therapy Interventions (OT) ADL retraining;transfer training   Clinical Decision Making Complexity (OT) low complexity   Therapy Frequency (OT) Daily   Predicted Duration of Therapy 3 days   Anticipated Equipment Needs Upon Discharge (OT) dressing equipment;raised toilet seat;shower chair  (TBD)   Risk &  Benefits of therapy have been explained care plan/treatment goals reviewed;patient   OT Discharge Planning    OT Discharge Recommendation (DC Rec) Home with assist   OT Rationale for DC Rec Anticipate patient will progress to safe discharge home with assist from friends/neighbors.    OT Brief overview of current status  CGA bed mobility using log roll, CGA to stand and take a few steps to chair using FWW, min A to control descent into chair due to minor LOB.    Total Evaluation Time (Minutes)   Total Evaluation Time (Minutes) 5

## 2021-06-05 ENCOUNTER — APPOINTMENT (OUTPATIENT)
Dept: OCCUPATIONAL THERAPY | Facility: CLINIC | Age: 66
DRG: 454 | End: 2021-06-05
Attending: ORTHOPAEDIC SURGERY
Payer: COMMERCIAL

## 2021-06-05 ENCOUNTER — APPOINTMENT (OUTPATIENT)
Dept: PHYSICAL THERAPY | Facility: CLINIC | Age: 66
DRG: 454 | End: 2021-06-05
Attending: ORTHOPAEDIC SURGERY
Payer: COMMERCIAL

## 2021-06-05 PROCEDURE — 99231 SBSQ HOSP IP/OBS SF/LOW 25: CPT | Performed by: INTERNAL MEDICINE

## 2021-06-05 PROCEDURE — 250N000013 HC RX MED GY IP 250 OP 250 PS 637: Performed by: STUDENT IN AN ORGANIZED HEALTH CARE EDUCATION/TRAINING PROGRAM

## 2021-06-05 PROCEDURE — 97530 THERAPEUTIC ACTIVITIES: CPT | Mod: GO

## 2021-06-05 PROCEDURE — 250N000011 HC RX IP 250 OP 636: Performed by: STUDENT IN AN ORGANIZED HEALTH CARE EDUCATION/TRAINING PROGRAM

## 2021-06-05 PROCEDURE — 120N000002 HC R&B MED SURG/OB UMMC

## 2021-06-05 PROCEDURE — 97530 THERAPEUTIC ACTIVITIES: CPT | Mod: GP

## 2021-06-05 PROCEDURE — 97535 SELF CARE MNGMENT TRAINING: CPT | Mod: GO

## 2021-06-05 RX ADMIN — Medication 2 G: at 00:28

## 2021-06-05 RX ADMIN — FAMOTIDINE 20 MG: 20 TABLET ORAL at 08:23

## 2021-06-05 RX ADMIN — OXYCODONE HYDROCHLORIDE 5 MG: 5 TABLET ORAL at 08:24

## 2021-06-05 RX ADMIN — METHOCARBAMOL 750 MG: 750 TABLET ORAL at 20:09

## 2021-06-05 RX ADMIN — OXYCODONE HYDROCHLORIDE 5 MG: 5 TABLET ORAL at 03:30

## 2021-06-05 RX ADMIN — FAMOTIDINE 20 MG: 20 TABLET ORAL at 20:08

## 2021-06-05 RX ADMIN — ACETAMINOPHEN 975 MG: 325 TABLET, FILM COATED ORAL at 11:09

## 2021-06-05 RX ADMIN — OXYCODONE HYDROCHLORIDE 5 MG: 5 TABLET ORAL at 00:28

## 2021-06-05 RX ADMIN — ACETAMINOPHEN 650 MG: 325 TABLET, FILM COATED ORAL at 14:45

## 2021-06-05 RX ADMIN — OXYCODONE HYDROCHLORIDE 10 MG: 5 TABLET ORAL at 11:10

## 2021-06-05 RX ADMIN — Medication 2 G: at 17:16

## 2021-06-05 RX ADMIN — ACETAMINOPHEN 650 MG: 325 TABLET, FILM COATED ORAL at 20:09

## 2021-06-05 RX ADMIN — OXYCODONE HYDROCHLORIDE 5 MG: 5 TABLET ORAL at 14:42

## 2021-06-05 RX ADMIN — OXYCODONE HYDROCHLORIDE 10 MG: 5 TABLET ORAL at 20:08

## 2021-06-05 RX ADMIN — GABAPENTIN 300 MG: 300 CAPSULE ORAL at 08:24

## 2021-06-05 RX ADMIN — OXYCODONE HYDROCHLORIDE 5 MG: 5 TABLET ORAL at 08:23

## 2021-06-05 RX ADMIN — ACETAMINOPHEN 975 MG: 325 TABLET, FILM COATED ORAL at 03:30

## 2021-06-05 RX ADMIN — Medication 2 G: at 08:29

## 2021-06-05 NOTE — PROGRESS NOTES
Patient was seen, course reviewed with nursing staff.  Patient reports gradual improvement in back pain.  She is progressing in therapy.  She denies cough, chest pain, shortness breath, dizziness, nausea, dysuria, cough abdominal pain.  She had a bowel movement this morning.    Vital signs stable  Intermittent low-grade temperature  O2 sats mid to upper 90s room air    Patient is pale appearing, alert, good spirits, appears well  Lungs clear  CV regular rhythm  Abdomen soft, nondistended, nontender  No lower extremity edema      Results for CARRI ANDERS (MRN [id number]) as of 6/5/2021 09:27   Ref. Range 6/4/2021 08:03 6/4/2021 14:11   Hemoglobin Latest Ref Range: 11.7 - 15.7 g/dL 7.3 (L) 7.5 (L)         Assessment/plan    s/p T10-pelvis fusion with TLIF x3 on 6/2 with Dr. León and Dr. Hamilton.   Pain control improving.  Patient is progressing in therapy  Plan: Per surgery.     Low-grade fever, likely secondary to inflammation from surgery versus atelectasis, stable   plan: Monitor vital signs.  Encourage incentive spirometry and mobility     History of hypertension, with relatively low blood pressure postoperatively, likely secondary to blood loss  Vitals stable  Prior to admission losartan remains on hold     Acute blood loss anemia with hemoglobin stable upon recheck yesterday  Patient appears asymptomatic.    Plan: Repeat hemoglobin in a.m. transfuse if significant lower or if patient is symptomatic    DAE on CPAP     DVT prophylaxis, NING

## 2021-06-05 NOTE — PLAN OF CARE
Pt c/o back pain radiating into hips. Denies numbness and tingling. Rates pain 8/10. Back dressing gauze and Tegaderm  CDI. Hemovac patent. Abd soft. BS+. Passing flatus. Had loose BM this am and yesterday. Stool softeners held this am. Tolerating regular diet. Sat in chair for lunch. Transferred with assist of 1 walker. Gait slow/steady. MEdicated

## 2021-06-05 NOTE — PLAN OF CARE
Physical Therapy Discharge Summary    Reason for therapy discharge:    All goals and outcomes met, no further needs identified.    Progress towards therapy goal(s). See goals on Care Plan in [facility] electronic health record for goal details.  Goals met    Therapy recommendation(s):    No further therapy is recommended.

## 2021-06-05 NOTE — PLAN OF CARE
VSS. A/Ox's 4. Pain rated as tolerable. Oxycodone and scheduled Tylenol given for pain control. CMS intact. Tolerated regular diet, only ate about 50% of fruit/yogurt- also was given 2 boost drinks. Denied any nausea, CP, SOB, lightheadedness or dizziness. [place] was removed and pt voided x3. Passing flatus and had soft BM today. Pt was up to side of bed and sat for dinner. Ambulating with assist x1 to bathroom. Resting in bed at this time with call light in reach and able to make needs known.

## 2021-06-05 NOTE — PROGRESS NOTES
"Orthopedic Surgery Progress Note   06/05/2021    Interval Events: afvss. Naeo. Tmax 100.8.    Subjective: Pain very well controlled. Has been up ad harpreet. Happy with progress and ability to rest overnight. No new numbness/tingling in legs. Encouraged out-of-bed activity with PT today and IS use. Care plan reviewed.     Objective: /47 (BP Location: Left arm)   Pulse 80   Temp 98.3  F (36.8  C) (Axillary)   Resp 20   Ht 1.702 m (5' 7\")   Wt 116.8 kg (257 lb 8 oz)   SpO2 97%   BMI 40.33 kg/m      General: NAD, alert and oriented, cooperative with exam.   Cardio: RRR, extremities wwp.   Respiratory: Non-labored breathing.  MSK:   Back dressing c/d/i.   Lower extremities:  Motor Strength Right Left   Hip flexion: L1, L2, L3 5/5 5/5   Knee flexion: S1 5/5 5/5   Knee extension: L3, L4 5/5 5/5   Ankle dorsiflexion: L4, L5 5/5 5/5   EHL: L5 5/5 5/5   Ankle plantarflexion: S1 5/5 5/5     Sensation from L3-S1 is preserved.    Output by Drain (mL) 06/03/21 0700 - 06/03/21 1459 06/03/21 1500 - 06/03/21 2259 06/03/21 2300 - 06/04/21 0659 06/04/21 0700 - 06/04/21 1459 06/04/21 1500 - 06/04/21 2259 06/04/21 2300 - 06/05/21 0659 06/05/21 0700 - 06/05/21 0909   Closed/Suction Drain 1 Right Back Accordion 10 Polish   120 30  65      Hemoglobin   Date Value Ref Range Status   06/04/2021 7.5 (L) 11.7 - 15.7 g/dL Final     Assessment and Plan: Madyson Murray is a 66 year old female now s/p T10-pelvis fusion with TLIF x3 on 6/2 with Dr. León and Dr. aHmilton. Doing well post-operatively.     Plan for Today:  -Continue to maintain optimal pain control  -Work with therapies today   -keep drains today   -get upright xrays done today     Ortho Primary  Activity: Up with assist until independent. No excessive bending or twisting. No lifting >10 lbs x 6 weeks. No José lift for transfers.   Weight bearing status: WBAT.  Pain management: Transition from IV to PO as tolerated. No NSAIDs   Antibiotics: Ancef until drain is " discontinued  Diet: Begin with clear fluids and progress diet as tolerated.   DVT prophylaxis: SCDs only. No chemical DVT ppx needed.  Imaging: XR Upright Lumbar PTDC - ordered.  Labs: Hgb POD#1. 7.5 on 6/4. Monitor symptoms.   Bracing/Splinting: None.  Dressings: Keep dressing c/d/i x 7 days then OK to shower.  Drains: Document output per shift, will be discontinued at Orthopedic Surgery discretion.   Okeefe catheter: Remove POD#1.   Physical Therapy/Occupational Therapy: Eval and treat.  Cultures: None.    Consults: Hospitalist.  Follow-up: Clinic with Dr. León in 2 weeks for wound check and 6 weeks with repeat x-rays.     Disposition: Pending progress with therapies, pain control on orals, and medical stability, anticipate discharge to home vs TCU on POD #4-5.    Ioana Ryan MD   Orthopedic Surgery, PGY4  Please see [facility] for pager

## 2021-06-05 NOTE — PLAN OF CARE
"      VS:   /50 (BP Location: Right arm)   Pulse 99   Temp 99.5  F (37.5  C) (Oral)   Resp 16   Ht 1.702 m (5' 7\")   Wt 116.8 kg (257 lb 8 oz)   SpO2 94%   BMI 40.33 kg/m   on RA, denies SOB or CP   Output:   Voiding in BR without difficulty, no BM this shift, HV 40cc   Activity:   Up with SBA + walker ambulating to BR, slow but steady gait, needs min help with getting back to bed, repositions self in bed   Skin: Intact except incision to back,    Pain:   Pain has been tolerable with prn oxycodone and scheduled tylenol, pt has been decreasing oxy to 5mg    Neuro/CMS:   Intact, denies numbness or tingling, moderate strength, able to move and bend all extremities appropriately, A&Ox4, pleasant and cooperative,    Dressing(s):   Gauze and tegaderm dressing to back-CDI   Diet:   Regular diet, denies nausea, no abdominal discomfort reported, able to swallow without difficulty    LDA:   PIV x2, HV x1   Equipment:   IV pole, walker, PCDs,    Plan:   Encourage activity, needs standing xray, monitor pain    Additional Info:   Pt has been progressing well and needs min assistance, able to make needs known call light in reach length, will continue to monitor and assist  TCU vs home - awaiting OT/PT eval       "

## 2021-06-06 ENCOUNTER — APPOINTMENT (OUTPATIENT)
Dept: GENERAL RADIOLOGY | Facility: CLINIC | Age: 66
DRG: 454 | End: 2021-06-06
Attending: STUDENT IN AN ORGANIZED HEALTH CARE EDUCATION/TRAINING PROGRAM
Payer: COMMERCIAL

## 2021-06-06 ENCOUNTER — APPOINTMENT (OUTPATIENT)
Dept: OCCUPATIONAL THERAPY | Facility: CLINIC | Age: 66
DRG: 454 | End: 2021-06-06
Attending: ORTHOPAEDIC SURGERY
Payer: COMMERCIAL

## 2021-06-06 LAB — HGB BLD-MCNC: 7.5 G/DL (ref 11.7–15.7)

## 2021-06-06 PROCEDURE — 36415 COLL VENOUS BLD VENIPUNCTURE: CPT | Performed by: INTERNAL MEDICINE

## 2021-06-06 PROCEDURE — 97535 SELF CARE MNGMENT TRAINING: CPT | Mod: GO

## 2021-06-06 PROCEDURE — 72080 X-RAY EXAM THORACOLMB 2/> VW: CPT

## 2021-06-06 PROCEDURE — 97530 THERAPEUTIC ACTIVITIES: CPT | Mod: GO

## 2021-06-06 PROCEDURE — 250N000013 HC RX MED GY IP 250 OP 250 PS 637: Performed by: STUDENT IN AN ORGANIZED HEALTH CARE EDUCATION/TRAINING PROGRAM

## 2021-06-06 PROCEDURE — 85018 HEMOGLOBIN: CPT | Performed by: INTERNAL MEDICINE

## 2021-06-06 PROCEDURE — 120N000002 HC R&B MED SURG/OB UMMC

## 2021-06-06 PROCEDURE — 72170 X-RAY EXAM OF PELVIS: CPT

## 2021-06-06 PROCEDURE — 99231 SBSQ HOSP IP/OBS SF/LOW 25: CPT | Performed by: INTERNAL MEDICINE

## 2021-06-06 RX ADMIN — OXYCODONE HYDROCHLORIDE 10 MG: 5 TABLET ORAL at 19:46

## 2021-06-06 RX ADMIN — OXYCODONE HYDROCHLORIDE 10 MG: 5 TABLET ORAL at 05:55

## 2021-06-06 RX ADMIN — DOCUSATE SODIUM 50MG AND SENNOSIDES 8.6MG 2 TABLET: 8.6; 5 TABLET, FILM COATED ORAL at 07:38

## 2021-06-06 RX ADMIN — METHOCARBAMOL 750 MG: 750 TABLET ORAL at 11:08

## 2021-06-06 RX ADMIN — OXYCODONE HYDROCHLORIDE 10 MG: 5 TABLET ORAL at 22:20

## 2021-06-06 RX ADMIN — OXYCODONE HYDROCHLORIDE 10 MG: 5 TABLET ORAL at 14:25

## 2021-06-06 RX ADMIN — ACETAMINOPHEN 650 MG: 325 TABLET, FILM COATED ORAL at 00:13

## 2021-06-06 RX ADMIN — POLYETHYLENE GLYCOL 3350 17 G: 17 POWDER, FOR SOLUTION ORAL at 07:38

## 2021-06-06 RX ADMIN — DOCUSATE SODIUM 50MG AND SENNOSIDES 8.6MG 2 TABLET: 8.6; 5 TABLET, FILM COATED ORAL at 19:46

## 2021-06-06 RX ADMIN — OXYCODONE HYDROCHLORIDE 5 MG: 5 TABLET ORAL at 00:13

## 2021-06-06 RX ADMIN — OXYCODONE HYDROCHLORIDE 10 MG: 5 TABLET ORAL at 11:08

## 2021-06-06 RX ADMIN — METHOCARBAMOL 750 MG: 750 TABLET ORAL at 19:46

## 2021-06-06 RX ADMIN — ACETAMINOPHEN 650 MG: 325 TABLET, FILM COATED ORAL at 14:25

## 2021-06-06 RX ADMIN — FAMOTIDINE 20 MG: 20 TABLET ORAL at 07:38

## 2021-06-06 RX ADMIN — ACETAMINOPHEN 650 MG: 325 TABLET, FILM COATED ORAL at 05:55

## 2021-06-06 RX ADMIN — FAMOTIDINE 20 MG: 20 TABLET ORAL at 19:46

## 2021-06-06 NOTE — PROGRESS NOTES
"Orthopedic Surgery Progress Note   06/06/2021    Interval Events: afvss. Naeo.    Subjective: Pain slightly increased from yesterday in low back; tolerable. Able to rest. No new numbness/tingling in legs. Encouraged out-of-bed activity with PT today and IS use. Care plan reviewed.     Objective: /50 (BP Location: Left arm)   Pulse 93   Temp 99.5  F (37.5  C) (Oral)   Resp 20   Ht 1.702 m (5' 7\")   Wt 116.8 kg (257 lb 8 oz)   SpO2 93%   BMI 40.33 kg/m      General: NAD, alert and oriented, cooperative with exam.   Cardio: RRR, extremities wwp.   Respiratory: Non-labored breathing.  MSK:   Back dressing c/d/i.   Lower extremities:  Motor Strength Right Left   Hip flexion: L1, L2, L3 5/5 5/5   Knee flexion: S1 5/5 5/5   Knee extension: L3, L4 5/5 5/5   Ankle dorsiflexion: L4, L5 5/5 5/5   EHL: L5 5/5 5/5   Ankle plantarflexion: S1 5/5 5/5     Sensation from L3-S1 is preserved.    Output by Drain (mL) 06/04/21 0700 - 06/04/21 1459 06/04/21 1500 - 06/04/21 2259 06/04/21 2300 - 06/05/21 0659 06/05/21 0700 - 06/05/21 1459 06/05/21 1500 - 06/05/21 2259 06/05/21 2300 - 06/06/21 0631   Closed/Suction Drain 1 Right Back Accordion 10 [place] 30  65        Hemoglobin   Date Value Ref Range Status   06/04/2021 7.5 (L) 11.7 - 15.7 g/dL Final     Assessment and Plan: Madyson Murray is a 66 year old female now s/p T10-pelvis fusion with TLIF x3 on 6/2 with Dr. León and Dr. Hamilton. Doing well post-operatively.     Plan for Today:  -Continue to maintain optimal pain control  -Work with therapies today   -keep drains today   -get upright xrays done today     Ortho Primary  Activity: Up with assist until independent. No excessive bending or twisting. No lifting >10 lbs x 6 weeks. No José lift for transfers.   Weight bearing status: WBAT.  Pain management: Transition from IV to PO as tolerated. No NSAIDs   Antibiotics: Ancef until drain is discontinued  Diet: Begin with clear fluids and progress diet as tolerated.   DVT " prophylaxis: SCDs only. No chemical DVT ppx needed.  Imaging: XR Upright Lumbar PTDC - ordered.  Labs: Hgb POD#1. 7.5 on 6/4. Monitor symptoms.   Bracing/Splinting: None.  Dressings: Keep dressing c/d/i x 7 days then OK to shower.  Drains: Document output per shift, will be discontinued at Orthopedic Surgery discretion.   Okeefe catheter: Remove POD#1.   Physical Therapy/Occupational Therapy: Eval and treat.  Cultures: None.    Consults: Hospitalist.  Follow-up: Clinic with Dr. León in 2 weeks for wound check and 6 weeks with repeat x-rays.     Disposition: Pending progress with therapies, pain control on orals, and medical stability, anticipate discharge to home vs TCU on POD #4-5.    Ioana Ryan MD   Orthopedic Surgery, PGY4  Please see [facility] for pager

## 2021-06-06 NOTE — PLAN OF CARE
"      VS: Blood pressure (!) 108/32, pulse 63, temperature 100  F (37.8  C), temperature source Oral, resp. rate 18, height 1.702 m (5' 7\"), weight 116.8 kg (257 lb 8 oz), SpO2 95 %, not currently breastfeeding.     Output: Voiding in the toilet without difficulties, LBM 6/4, HV 32ml.   Lungs: Clear,denied SOB and chest pain.   Activity: SBA with walker, declined GB, slow but steady gait. Min assistance to turn in bed and lift legs when get into bed.   Skin: Intact except for spine incision. Mild edema on feet. PCD on.    Pain:   Lower back pain, ice pack in place, PRN oxy 10 mg given x 2, last time was 2220.    Neuro/CMS:   A & O x 4. CMS intact, denied N/T.    Dressing(s):   Spine dressing CDI.   Diet:   Ate 100 % of R/T dinner.    LDA:   PIV, SL.   Equipment:   IV pole, walker, PCD, commode above toilet.    Plan:   Continue with plan of care.    Additional Info:   Standing x ray did today.   HV will be removed tomorrow.   Pt wants to be woke up around 0200 for pain medication.        "

## 2021-06-06 NOTE — PROGRESS NOTES
D: Pt  A/O x3. VSS . Lungs- CL, no c/o chest pain/ SOB. sats=94  % RA. IS up to 1000   . Bowel+. PP- +. +2 edema- feet/legs. CMS- intact. Pt taking PO REG  food/ fluids well. Voiding Good amounts in BR. Up with SBA/walker/ belt . Pt up in chair for meals.. Pain/CAPA - tolerable on PO pain meds.BACK  Dressing -C/D/I. HV-  Min output.pt sent down for stand Xray.  A: con't to monitor. Call light in reach. Pt able to make needs known.

## 2021-06-06 NOTE — PLAN OF CARE
"VS:    /50 (BP Location: Left arm)   Pulse 93   Temp 99.5  F (37.5  C) (Oral)   Resp 20   Ht 1.702 m (5' 7\")   Wt 116.8 kg (257 lb 8 oz)   SpO2 93%   BMI 40.33 kg/m   on RA, denies SOB or CP   Output:    Voiding in BR without difficulty, no BM this shift, HV    Activity:    Up with SBA + walker ambulating to BR, slow but steady gait, needs min help with getting back to bed, repositions self in bed   Skin: Intact except incision to back,    Pain:    Pain has been tolerable with prn oxycodone and scheduled tylenol,    Neuro/CMS:    Intact, denies numbness or tingling, moderate strength, able to move and bend all extremities appropriately, A&Ox4, pleasant and cooperative,    Dressing(s):    Gauze and tegaderm dressing to back-CDI   Diet:    Regular diet, denies nausea, no abdominal discomfort reported, able to swallow without difficulty    LDA:    PIV, HV x1   Equipment:    IV pole, walker, PCDs,    Plan:    Encourage activity, needs standing xray, monitor pain    Additional Info:    Pt has been progressing well and needs min assistance, able to make needs known call light in reach length, will continue to monitor and assist     "

## 2021-06-06 NOTE — PROGRESS NOTES
Pt was seen, course reviewed with team    Pt notes sl increased back and R thigh pain this am, but overall is feeling improved  Is progressing in therapies  No c/o cough, chest pain, SOB, dizziness  Last BM was yesterday      VSS  Afebrile  BP 120s-130s/  HR 90s    Alert, fully oriented, pale appearing  Lungs clear  CV rrr  Abd soft  No LE edema    Hgb 7.5    Assessment/plan    s/p T10-pelvis fusion with TLIF x3 on 6/2 with Dr. León and Dr. Hamilton.   Pain control overall appears adequate.  Patient is progressing in therapy  Plan: Per surgery.     History of hypertension, with relatively low blood pressure postoperatively, likely secondary to blood loss  Vitals stable  Prior to admission losartan remains on hold     Acute blood loss anemia   Hgb stable over the last 24 hours  Patient appears asymptomatic.    Plan: Repeat Hgb prn symptoms    DAE on CPAP     DVT prophylaxis, NING

## 2021-06-06 NOTE — PLAN OF CARE
Occupational Therapy Discharge Summary    Reason for therapy discharge:    All goals and outcomes met, no further needs identified.    Progress towards therapy goal(s). See goals on Care Plan in [facility] electronic health record for goal details.  Goals met    Therapy recommendation(s):    No further therapy is recommended.

## 2021-06-07 VITALS
HEIGHT: 67 IN | SYSTOLIC BLOOD PRESSURE: 117 MMHG | HEART RATE: 72 BPM | WEIGHT: 257.5 LBS | BODY MASS INDEX: 40.42 KG/M2 | OXYGEN SATURATION: 98 % | RESPIRATION RATE: 16 BRPM | DIASTOLIC BLOOD PRESSURE: 41 MMHG | TEMPERATURE: 97.9 F

## 2021-06-07 LAB — HGB BLD-MCNC: 7.5 G/DL (ref 11.7–15.7)

## 2021-06-07 PROCEDURE — 36415 COLL VENOUS BLD VENIPUNCTURE: CPT | Performed by: ORTHOPAEDIC SURGERY

## 2021-06-07 PROCEDURE — 85018 HEMOGLOBIN: CPT | Performed by: ORTHOPAEDIC SURGERY

## 2021-06-07 PROCEDURE — 99231 SBSQ HOSP IP/OBS SF/LOW 25: CPT | Performed by: INTERNAL MEDICINE

## 2021-06-07 PROCEDURE — 250N000013 HC RX MED GY IP 250 OP 250 PS 637: Performed by: STUDENT IN AN ORGANIZED HEALTH CARE EDUCATION/TRAINING PROGRAM

## 2021-06-07 RX ORDER — AMOXICILLIN 250 MG
2 CAPSULE ORAL 2 TIMES DAILY
Qty: 30 TABLET | Refills: 0 | Status: SHIPPED | OUTPATIENT
Start: 2021-06-07 | End: 2021-08-18

## 2021-06-07 RX ORDER — ACETAMINOPHEN 325 MG/1
650 TABLET ORAL EVERY 4 HOURS PRN
Qty: 40 TABLET | Refills: 0 | Status: SHIPPED | OUTPATIENT
Start: 2021-06-07 | End: 2021-06-11

## 2021-06-07 RX ORDER — OXYCODONE HYDROCHLORIDE 5 MG/1
5-10 TABLET ORAL EVERY 4 HOURS PRN
Qty: 40 TABLET | Refills: 0 | Status: SHIPPED | OUTPATIENT
Start: 2021-06-07 | End: 2021-06-11

## 2021-06-07 RX ORDER — POLYETHYLENE GLYCOL 3350 17 G/17G
17 POWDER, FOR SOLUTION ORAL DAILY
Qty: 7 PACKET | Refills: 0 | Status: SHIPPED | OUTPATIENT
Start: 2021-06-08 | End: 2021-08-18

## 2021-06-07 RX ORDER — METHOCARBAMOL 750 MG/1
750 TABLET, FILM COATED ORAL 4 TIMES DAILY PRN
Qty: 40 TABLET | Refills: 0 | Status: SHIPPED | OUTPATIENT
Start: 2021-06-07 | End: 2021-08-18

## 2021-06-07 RX ADMIN — FAMOTIDINE 20 MG: 20 TABLET ORAL at 08:05

## 2021-06-07 RX ADMIN — ACETAMINOPHEN 650 MG: 325 TABLET, FILM COATED ORAL at 01:24

## 2021-06-07 RX ADMIN — ACETAMINOPHEN 650 MG: 325 TABLET, FILM COATED ORAL at 15:50

## 2021-06-07 RX ADMIN — OXYCODONE HYDROCHLORIDE 10 MG: 5 TABLET ORAL at 08:04

## 2021-06-07 RX ADMIN — OXYCODONE HYDROCHLORIDE 10 MG: 5 TABLET ORAL at 11:29

## 2021-06-07 RX ADMIN — DOCUSATE SODIUM 50MG AND SENNOSIDES 8.6MG 2 TABLET: 8.6; 5 TABLET, FILM COATED ORAL at 08:06

## 2021-06-07 RX ADMIN — METHOCARBAMOL 750 MG: 750 TABLET ORAL at 06:55

## 2021-06-07 RX ADMIN — OXYCODONE HYDROCHLORIDE 10 MG: 5 TABLET ORAL at 04:35

## 2021-06-07 RX ADMIN — ACETAMINOPHEN 650 MG: 325 TABLET, FILM COATED ORAL at 06:55

## 2021-06-07 RX ADMIN — ACETAMINOPHEN 650 MG: 325 TABLET, FILM COATED ORAL at 11:29

## 2021-06-07 RX ADMIN — OXYCODONE HYDROCHLORIDE 10 MG: 5 TABLET ORAL at 15:50

## 2021-06-07 RX ADMIN — METHOCARBAMOL 750 MG: 750 TABLET ORAL at 00:50

## 2021-06-07 RX ADMIN — OXYCODONE HYDROCHLORIDE 10 MG: 5 TABLET ORAL at 01:25

## 2021-06-07 NOTE — PROGRESS NOTES
"Orthopedic Surgery Progress Note   06/07/2021    Interval Events: afvss. Naeo.    Subjective: Pain controlled this morning. Up and down weekend, but cleared therapies and symptoms manageable. No new numbness/tingling in legs. Feels she would be ready to discharge home today if she can get a walker that locks, as well as some other equipment for home that will allow her to be more independent. Has a friend that can provide a ride later in the afternoon.     Objective: /42 (BP Location: Left arm)   Pulse 95   Temp 99.1  F (37.3  C) (Oral)   Resp 15   Ht 1.702 m (5' 7\")   Wt 116.8 kg (257 lb 8 oz)   SpO2 99%   BMI 40.33 kg/m      General: NAD, alert and oriented, cooperative with exam.   Cardio: RRR, extremities wwp.   Respiratory: Non-labored breathing.  MSK:   Back dressing c/d/i.   Lower extremities:  Motor Strength Right Left   Hip flexion: L1, L2, L3 5/5 5/5   Knee flexion: S1 5/5 5/5   Knee extension: L3, L4 5/5 5/5   Ankle dorsiflexion: L4, L5 5/5 5/5   EHL: L5 5/5 5/5   Ankle plantarflexion: S1 5/5 5/5     Sensation from L3-S1 is preserved.    Output by Drain (mL) 06/05/21 0700 - 06/05/21 1459 06/05/21 1500 - 06/05/21 2259 06/05/21 2300 - 06/06/21 0659 06/06/21 0700 - 06/06/21 1459 06/06/21 1500 - 06/06/21 2259 06/06/21 2300 - 06/07/21 0659 06/07/21 0700 - 06/07/21 0811   Closed/Suction Drain 1 Right Back Accordion 10 Thai     32  0     Hemoglobin   Date Value Ref Range Status   06/07/2021 7.5 (L) 11.7 - 15.7 g/dL Final     Assessment and Plan: Madyson Murray is a 66 year old female now s/p T10-pelvis fusion with TLIF x3 on 6/2 with Dr. León and Dr. Hamilton. Doing well post-operatively.     Plan for Today:  -Provision of equipment for home (walker that locks, \"grabber\", leg )  -remove drain today  -Discharge home    Ortho Primary  Activity: Up with assist until independent. No excessive bending or twisting. No lifting >10 lbs x 6 weeks. No José lift for transfers.   Weight bearing status: " WBAT.  Pain management: Transition from IV to PO as tolerated. No NSAIDs   Antibiotics: Ancef until drain is discontinued  Diet: Begin with clear fluids and progress diet as tolerated.   DVT prophylaxis: SCDs only. No chemical DVT ppx needed.  Imaging: XR Upright Lumbar PTDC - ordered.  Labs: Hgb POD#1. 7.5 on 6/4 and 6/6, asymptomatic.  Bracing/Splinting: None.  Dressings: Can remove today 6/7 and leave incision open to air  Drains: Removed 6/7  Okeefe catheter: Remove POD#1.   Physical Therapy/Occupational Therapy: Eval and treat.  Cultures: None.    Consults: Hospitalist.  Follow-up: Clinic with Dr. León in 2 weeks for wound check and 6 weeks with repeat x-rays.     Disposition: Anticipate discharge home today 6/7 in the afternoon    Marcos Burk MD  Orthopedic Surgery PGY-4  Pager: [phone]

## 2021-06-07 NOTE — PLAN OF CARE
"      VS: /42 (BP Location: Left arm)   Pulse 95   Temp 99.1  F (37.3  C) (Oral)   Resp 15   Ht 1.702 m (5' 7\")   Wt 116.8 kg (257 lb 8 oz)   SpO2 99%   BMI 40.33 kg/m       Output: Voiding spontaneously without difficulty. LBM 6/4, BS active x4.     Lungs: LS clear, on RA. Denies chest pain, SOB, cough. Hemovac output ~ 0mL.     Activity: SBA w/ walker, declining use of gait belt. Ambulating to bathroom throughout night, steady gait.     Skin: Intact ex spinal incision.   Pain:   Partially controlled w/ robaxin 4x/day, prn oxycodone 10 mg q3hr, and prn tylenol q4hr. Pt also using ice pack to back intermittently.      Neuro/CMS:   A&Ox4, CMS intact, denies N/T; trace edema to bilateral feet.   Dressing(s):   Gauze and tegaderm - CDI   Diet:   Regular diet, tolerating water with meds overnight.    LDA:   R hand PIV SL   Equipment:   Walker, IV pole, PCDs   Plan:   Monitor pain control. Standing X-rays completed. Anticipated drain removal today. Continue to follow plan of care.     Additional Info:   Hgb 7.5 - continue to monitor.       "

## 2021-06-07 NOTE — PLAN OF CARE
"/41 (BP Location: Left leg)   Pulse 72   Temp 97.9  F (36.6  C) (Oral)   Resp 16   Ht 1.702 m (5' 7\")   Wt 116.8 kg (257 lb 8 oz)   SpO2 98%   BMI 40.33 kg/m      Aox4, CMS intact. Flat affect, withdrawn. LE edema: 0. Independent in room.   LS clear, on RA. Voiding spontaneously, adequate amts. LBM 6/4. BS x4.   Skin wnl ex incision. Drsg CDI, no new drainage. Hemovac drain pulled this AM.   Pain incisional, aching, constant, lower back. Managed with prn oxycodone, tylenol, and robaxin. Regular diet, fair appetite.   Plan: Pt has been cleared by medicine and ortho teams and is planning to go home late afternoon.   AVS printed and discharge education given re: MD note, pt instructed to \"monitor BP, will resume losartan when BP consistently over 130.\" Pt education provided on discharge medications, activity restrictions, and assistive equipment, pt states she has a walker at home. Discharge medications are on the unit. Pt states ride is coming at 4pm. Continue to monitor.   "

## 2021-06-07 NOTE — PROGRESS NOTES
Pt was seen, course reviewed     Pt is feeling fairly well, feels ready for discharge to home  Fair pain control  Last BM 6/4    VSS  BP 100s-120s/  Alert, fully oriented    Lungs clear  CV rrr  Abd soft  No LE edema      Assessment    s/p T10-pelvis fusion with TLIF x3 on 6/2 with Dr. León and Dr. Hamilton.   Pain control overall appears adequate.  Patient is progressing in therapy  Plan: Per surgery.     History of hypertension, with relatively low blood pressure postoperatively, likely secondary to blood loss  Vitals stable  Prior to admission losartan remains on hold     Acute blood loss anemia   Hgb stable    Plan  Pt is medically stable for discharge to home  Instructed to monitor BP, will resume losartan when BP consistently over 130

## 2021-06-08 ENCOUNTER — PATIENT OUTREACH (OUTPATIENT)
Dept: CARE COORDINATION | Facility: CLINIC | Age: 66
End: 2021-06-08

## 2021-06-08 DIAGNOSIS — Z71.89 OTHER SPECIFIED COUNSELING: ICD-10-CM

## 2021-06-08 NOTE — PROGRESS NOTES
[facility]: Post-Discharge Note  SITUATION                                                      Admission: 6/2/21          Discharge:6/7/21        BACKGROUND                                                    66 year old female admitted on 6/2/2021. She has a known H/O  obesity, HTN, pulmonary HTN, tricuspid regurgitation, and venous insufficiency, DAE, She underwent Thoracic 11 to Pelvis posterior instrumented fusion with Lumbar 2 to 4 and 5 to 1  interbody fusion with lee stoddard osteotomy 4-5 and 3 column osteotomy 2-3 and 5-1 with O-Arm/Stealth Navigation, use of allograft, local autograft, bone morphogenic protein, dural repair  Internal medicine consulted for postoperative co management  Individual problems and their management are outlined below       ASSESSMENT      Discharge Assessment  Patient reports symptoms are: Improved  Does the patient have all of their medications?: Yes  Does patient know what their new medications are for?: Yes  Does patient have a follow-up appointment scheduled?: No  Does patient have any other questions or concerns?: No    Post-op  Did the patient have surgery or a procedure: Yes  Bleeding: none  Fever: No  Chills: No  Redness: No  Warmth: No  Swelling: No  Incision site pain: No  Eating & Drinking: eating and drinking without complaints/concerns  Bowel Function: normal  Urinary Status: voiding without complaint/concerns        PLAN                                                      Outpatient Plan:  Adult [facility] Follow-up and recommended labs and tests  Follow-up: Clinic with Dr. León in 2 weeks for wound check and 6 weeks with repeat x-rays.  Appointments on [place] and/or [facility] (with [facility] or [facility] provider or service). Call [phone] if you  haven't heard regarding these appointments within 7 days of discharge.        Future Appointments   Date Time Provider Department Center   6/9/2021 10:00 AM Kenny Conley [facility],  [place]

## 2021-06-08 NOTE — DISCHARGE SUMMARY
ORTHOPAEDIC SURGERY DISCHARGE SUMMARY     Date of Admission: 6/2/2021  Date of Discharge: 6/7/2021  4:11 PM  Disposition: Home  Staff Physician: Dr. Gab León  Primary Care Provider: Ga Ken Jr.    DISCHARGE DIAGNOSIS:  Degenerative scoliosis in adult patient [M41.80]  Lumbar radiculopathy [M54.16]    PROCEDURES: Procedure(s):  Thoracic 11 to Pelvis posterior instrumented fusion with Lumbar 2 to 4 and 5 to 1  interbody fusion with lee stoddard osteotomy 4-5 and 3 column osteotomy 2-3 and 5-1 with O-Arm/Stealth Navigation, use of allograft, local autograft, bone morphogenic protein on 6/2/2021    BRIEF HISTORY:  66 year old female with history of low back pain, deformity, and radicular symptoms with imaging showing a degenerative scoliosis in her low back. She elected surgical treatment, and understood the indications for this surgery, as well as its risks, benefits, and alternatives as documented in the pre-operative H&P    HOSPITAL COURSE:    The patient was admitted following the above listed procedures for pain control and rehabilitation. Madyson Murray did well post-operatively. Medicine was consulted post operatively to aid in management of medical co-morbidities. The patient received routine nursing cares and at the time of discharge was medically stable. Vital signs were stable throughout admission. The patient is tolerating a regular diet and is voiding spontaneously. All PT/OT goals have been met for safe mobility. Pain is now controlled on oral medications which will be available on discharge. Stool softeners have been used while taking pain medications to help prevent constipation. Madyson Murray is deemed medically safe to discharge.     Antibiotics:  Ancef given periop and continued post-op until the drain was removed on POD5  DVT prophylaxis:  Mechanical.   PT Progress:  Has met PT/OT goals for safe mobility at home   Pain Meds:  Weaned off all IV pain meds by discharge.  Inpatient  Events: No significant events or complications.    PHYSICAL EXAM:    General: NAD, alert and oriented, cooperative with exam.   Cardio: RRR, extremities wwp.   Respiratory: Non-labored breathing.  MSK:   Back dressing c/d/i.   Lower extremities:  Motor Strength Right Left   Hip flexion: L1, L2, L3 5/5 5/5   Knee flexion: S1 5/5 5/5   Knee extension: L3, L4 5/5 5/5   Ankle dorsiflexion: L4, L5 5/5 5/5   EHL: L5 5/5 5/5   Ankle plantarflexion: S1 5/5 5/5      Sensation from L3-S1 is preserved.      FOLLOWUP:    Follow up with Dr. León at 2 weeks post-op for a wound check, and again at 6 weeks postoperatively.    Future Appointments   Date Time Provider Department Center   6/9/2021 10:00 AM Kenny Conley [facility]       Orthopaedic Surgery appointments are at the [facility] [place] [facility] ([address] [place] [address]). Call [phone] to schedule a follow-up appointment at this location with your provider.     PLANNED DISCHARGE ORDERS:      Discharge Medication List as of 6/7/2021  2:25 PM      START taking these medications    Details   methocarbamol (ROBAXIN) 750 MG tablet Take 1 tablet (750 mg) by mouth 4 times daily as needed for muscle spasms, Disp-40 tablet, R-0, E-Prescribe      oxyCODONE (ROXICODONE) 5 MG tablet Take 1-2 tablets (5-10 mg) by mouth every 4 hours as needed, Disp-40 tablet, R-0, E-Prescribe      polyethylene glycol (MIRALAX) 17 g packet Take 17 g by mouth daily, Disp-7 packet, R-0, E-Prescribe      senna-docusate (SENOKOT-S/PERICOLACE) 8.6-50 MG tablet Take 2 tablets by mouth 2 times daily, Disp-30 tablet, R-0, E-Prescribe         CONTINUE these medications which have CHANGED    Details   acetaminophen (TYLENOL) 325 MG tablet Take 2 tablets (650 mg) by mouth every 4 hours as needed for other, Disp-40 tablet, R-0, E-Prescribe         CONTINUE these medications which have NOT CHANGED    Details   losartan (COZAAR) 25 MG tablet Take 2 tablets (50 mg) by mouth daily, Disp-90  tablet, R-1, E-Prescribe         STOP taking these medications       meloxicam (MOBIC) 15 MG tablet Comments:   Reason for Stopping:                 Discharge Procedure Orders   Reason for your hospital stay   Order Comments: You had spine surgery     Wound care and dressings   Order Comments: Instructions to care for your wound at home:       Bathing and Wound Care:  Please leave the dressing in place for 5 days as a minimum. Before the dressing comes off, you should not get it wet and should sponge bathe around the wound.  After 5 days you may take the dressing off and leave it open to air.  Once the dressing is off, you may shower normally and let water gently fall over the wound and then pat it dry.  Do not put any creams or lotions on the wound.  Contact us with any new drainage or worsening redness or irritation of the wound.  General Information:  Your wound should remain relatively dry.  If it has a few tiny dots or spots of drainage on it, that is OK, but if there is continued wetness or drainage beyond this please contact the clinic.  Contact us with any redness around the wound or if you have more than 24 consecutive hours of temperature over 101 degrees F.  A single fever that goes away is OK and is nothing to be alarmed about.  Contact us immediately with any new numbness or weakness in the legs or arms.  Call [phone] and ask to speak to Dr León's nurse or the triage nurse. After hours or on weekends call the hospital  at [phone] and ask to speak to the ortho resident on call.     When to contact your care team   Order Comments: Call Dr León if you have any of the following: temperature greater than 101.5  or less than 96.3 ,  increased shortness of breath, increased drainage, increased swelling or increased pain.     Activity   Order Comments: Your activity upon discharge:   Lumbar Activity:  Avoid repetitive bending or twisting motions.  No lifting over 10-15 pounds.  No  aggressive athletic activities such as running, tennis, bowling, golf or other sports until released by the clinic.  However, we DO want you to be up and walking at least 30 minutes per day to prevent generalized deconditioning.     Order Specific Question Answer Comments   Is discharge order? Yes      Adult [facility] Follow-up and recommended labs and tests   Order Comments: Follow-up: Clinic with Dr. León in 2 weeks for wound check and 6 weeks with repeat x-rays.   Appointments on [place] and/or [facility] (with [facility] or [facility] provider or service). Call [phone] if you haven't heard regarding these appointments within 7 days of discharge.     Discharge Instructions   Order Comments: Check your blood pressure 1-2 times per day. Resume losartan when your systolic blood pressure is persistently over 130     Diet   Order Comments: Follow this diet upon discharge: resume your usual pre operative diet     Order Specific Question Answer Comments   Is discharge order? Yes      Assign Questionnaire Series to Patient       Marcos Burk MD  Orthopedic Surgery PGY-4  Pager: [phone]

## 2021-06-08 NOTE — PROGRESS NOTES
Clinic Care Coordination Contact  Community Health Worker Initial Outreach    CHW Initial Information Gathering:  Referral Source: PCP  Current living arrangement:: I live alone  Type of residence:: Apartment  Community Resources: None  Supplies used at home:: None  Equipment Currently Used at Home: walker, standard  Informal Support system:: Friends  Transportation means:: Accessible car  CHW Additional Questions  If ED/Hospital discharge, follow-up appointment scheduled as recommended?: No  Patient agreeable to assistance with scheduling?: Yes  MyChart active?: Yes  Patient sent Social Determinants of Health questionnaire?: No    Patient accepts CC: Yes. Patient scheduled for assessment with Leda DE LA CRUZ on 6/9/21 at 10:00. Patient noted desire to discuss She feels that she is going to need help with PT and Home care..

## 2021-06-09 ENCOUNTER — TELEPHONE (OUTPATIENT)
Dept: FAMILY MEDICINE | Facility: CLINIC | Age: 66
End: 2021-06-09

## 2021-06-09 ENCOUNTER — PATIENT OUTREACH (OUTPATIENT)
Dept: NURSING | Facility: CLINIC | Age: 66
End: 2021-06-09
Payer: COMMERCIAL

## 2021-06-09 ASSESSMENT — ACTIVITIES OF DAILY LIVING (ADL): DEPENDENT_IADLS:: INDEPENDENT

## 2021-06-09 NOTE — TELEPHONE ENCOUNTER
Patient discharged from [facility] for inpatient hospital stay on 6/7 for Lumbar radiculopathy , degenerative scoliosis .    Please contact patient to follow up; no appointment scheduled at this time.    ER / IP:  0/1    Care Coordination:  Inactive       Paula Souza

## 2021-06-09 NOTE — LETTER
[facility] CARE COORDINATION  [address] [facility] [address]  June 9, 2021    Madyson Murray  [address] [place]   [place] [address]      Dear Madyson,    I am a clinic care coordinator who works with Ga Ken Jr, MD at the [facility]. I wanted to thank you for spending the time to talk with me.  Below is a description of clinic care coordination and how I can further assist you.      The clinic care coordination team is made up of a registered nurse,  and community health worker who understand the health care system. The goal of clinic care coordination is to help you manage your health and improve access to the health care system in the most efficient manner. The team can assist you in meeting your health care goals by providing education, coordinating services, strengthening the communication among your providers and supporting you with any resource needs.    Please feel free to contact me at [phone] with any questions or concerns. We are focused on providing you with the highest-quality healthcare experience possible and that all starts with you.     Sincerely,       ANGE Ruiz  Clinic Care Coordinator  Ph. [phone]  [email or web address]

## 2021-06-09 NOTE — TELEPHONE ENCOUNTER
"  ED/Discharge Protocol    \"Hi, my name is AZIZA CRESPO RN, a registered nurse, and I am calling on behalf of Dr. Ken's office at [facility].  I am calling to follow up and see how things are going for you after your recent visit.\"    \"I see that you were in the (ER/UC/IP) on 6/2-6/7.    How are you doing now that you are home?\" doing ok as expected    Is patient experiencing symptoms that may require a hospital visit?  No, ankles and feet are swollen. They were swollen in hospital too but advised will resolve. Patient will call her surgeon's care team.     Discharge Instructions    \"Let's review your discharge instructions.  What is/are the follow-up recommendations?  Pt. Response:       FOLLOWUP:    Follow up with Dr. León at 2 weeks post-op for a wound check, and again at 6 weeks postoperatively.    \"Were you instructed to make a follow-up appointment?\"  Pt. Response: No.       \"When you see the provider, I would recommend that you bring your discharge instructions with you.    Medications    \"How many new medications are you on since your hospitalization/ED visit?\"    2 or more - Epic MTM referral needed oxycodone, robaxin, stool softeners  \"How many of your current medicines changed (dose, timing, name, etc.) while you were in the hospital/ED visit?\"   0-1  SBP over 130  Yesterday it was 114/44  \"Do you have questions about your medications?\"   No  \"Were you newly diagnosed with heart failure, COPD, diabetes or did you have a heart attack?\"   No  For patients on insulin: \"Did you start on insulin in the hospital or did you have your insulin dose changed?\"   No  Post Discharge Medication Reconciliation Status: discharge medications reconciled and changed, per note/orders.    Was MTM referral placed (*Make sure to put transitions as reason for referral)?   No    Call Summary    \"Do you have any questions or concerns about your condition or care plan at the moment?\"    No  Triage nurse advice given: call " "back if chest pain, sob, dizziness, worsening pain    Patient was in ER 0 in the past year (assess appropriateness of ER visits.)      \"If you have questions or things don't continue to improve, we encourage you contact us through the main clinic number,  [phone].  Even if the clinic is not open, triage nurses are available 24/7 to help you.     We would like you to know that our clinic has extended hours (provide information).  We also have urgent care (provide details on closest location and hours/contact info)\"      \"Thank you for your time and take care!\"       "

## 2021-06-09 NOTE — PROGRESS NOTES
Clinic Care Coordination Contact    Clinic Care Coordination Contact  OUTREACH    Referral Information:  Referral Source: PCP  Primary Diagnosis: Orthopedic    Chief Complaint   Patient presents with     Clinic Care Coordination - Post Hospital     Discharge to Home        [place] Utilization: Reviewed on this date.   Difficulty keeping appointments: No  Compliance Concerns: No  No-Show Concerns: No  No PCP office visit in Past Year: No  Utilization    Last refreshed: 6/9/2021  9:52 AM: Hospital Admissions 1           Last refreshed: 6/9/2021  9:52 AM: ED Visits 0           Last refreshed: 6/9/2021  9:52 AM: No Show Count (past year) 3              Current as of: 6/9/2021  9:52 AM            Clinical Concerns:  Current Medical Concerns:  Patient was hospitalized at [facility] from 6.2.2021 to 6.7.2021 with diagnoses of:  Degenerative scoliosis in adult patient [M41.80]  Lumbar radiculopathy [M54.16]    Patient Active Problem List   Diagnosis     Morbid obesity (H)     CARDIOVASCULAR SCREENING; LDL GOAL LESS THAN 130     Pulmonary hypertension (H)     Non-rheumatic tricuspid valve insufficiency     Obstructive sleep apnea syndrome     Venous (peripheral) insufficiency     Chronic bilateral low back pain without sciatica     Somatic dysfunction of lumbar region     Somatic dysfunction of sacral region     Sacral pain     Thoracic segment dysfunction     Right shoulder pain, unspecified chronicity     Degenerative scoliosis in adult patient     Lumbar radiculopathy     Current Behavioral Concerns: None noted.    Education Provided to patient: Role of [facility] CC and reason for outreach.   Health Maintenance Reviewed: Due/Overdue   Clinical Pathway: None    Medication Management:  Medication reconciliation status: Medications reviewed and reconciled.  Continue medications without change.       Functional Status:  Dependent ADLs: Independent  Dependent IADLs: Independent  Bed or  "wheelchair confined: No  Mobility Status: Independent    Living Situation:  Current living arrangement: I live alone  Type of residence: Apartment    Lifestyle & Psychosocial Needs:  Diet: Regular  Inadequate nutrition : No  Tube Feeding: No  Inadequate activity/exercise : No  Significant changes in sleep pattern : No  Transportation means: Accessible car     Orthodox or spiritual beliefs that impact treatment: No  Mental health DX: No  Mental health management concern : No  Chemical Dependency Status: No Current Concerns  Informal Support system: Friends   Socioeconomic History     Marital status:      Spouse name: Not on file     Number of children: Not on file     Years of education: Not on file     Highest education level: Not on file     Tobacco Use     Smoking status: Former Smoker     Packs/day: 1.00     Years: 15.00     Pack years: 15.00     Types: Cigarettes     Smokeless tobacco: Never Used     Tobacco comment: quit around age 32   Substance and Sexual Activity     Alcohol use: Yes     Comment: 2-4 drinks couple times per week     Drug use: No     Sexual activity: Yes       Patient reports that she is doing well since discharge, but is feeling \"restless\" as she is used to being more active. We reviewed discharge instructions. Pt aware that ortho would likely order home care or PT if/when appropriate. Patient is in agreement with this plan, and will continue to follow-up with her surgeon.      Resources and Interventions:  Community Resources: None  Supplies used at home: None  Equipment Currently Used at Home: walker, standard    Advance Care Plan/Directive  Advanced Care Plans/Directives on file: No  Advanced Care Plan/Directive Status: Considering Options    Referrals Placed: Specialty Providers     Patient/Caregiver understanding: Pt reports understanding and denies any additional questions or concerns at this times. [facility] CC engaged in [facility] communication during encounter.    Plan: No further " outreaches will be made at this time unless a new referral is made or a change in the patient's status occurs. Patient was provided with this writer's contact information and encouraged to call with any questions or concerns.    ANGE Ruiz  Clinic Care Coordinator  Ph. [phone]  [email or web address]

## 2021-06-11 DIAGNOSIS — G89.18 POST-OPERATIVE PAIN: ICD-10-CM

## 2021-06-11 DIAGNOSIS — M54.16 LUMBAR RADICULOPATHY: ICD-10-CM

## 2021-06-11 DIAGNOSIS — Z98.1 S/P SPINAL FUSION: Primary | ICD-10-CM

## 2021-06-11 RX ORDER — OXYCODONE HYDROCHLORIDE 5 MG/1
5-10 TABLET ORAL EVERY 4 HOURS PRN
Qty: 65 TABLET | Refills: 0 | Status: SHIPPED | OUTPATIENT
Start: 2021-06-11 | End: 2021-08-18

## 2021-06-11 RX ORDER — ACETAMINOPHEN 325 MG/1
650 TABLET ORAL EVERY 4 HOURS PRN
Qty: 40 TABLET | Refills: 0 | Status: SHIPPED | OUTPATIENT
Start: 2021-06-11 | End: 2021-08-18

## 2021-06-11 NOTE — TELEPHONE ENCOUNTER
RN called patient and triage patient's condition.  Patient's biggest issue is getting in and out of bed. RN advised patient to get a step stool. RN provided a few tips how to navigate in and around the room. RN also encouraged patient to do short walks and advance as tolerated. RN verified that patient could use a refill for her pain meds and muscle relaxants. Patient stated she needed oxycodone and Tylenol. RN sent script to [facility] to sign. Verified [facility] [address]. Also made a two week follow up for patient for wound check and suture removal. Patient will come to see Dr. León on 6/18 at 1200.    Ca Freeman RN       Health Call Center    Phone Message    May a detailed message be left on voicemail: yes     Reason for Call: Symptoms or Concerns     If patient has red-flag symptoms, warm transfer to triage line    Current symptom or concern: foot numbness, pain & swelling after surgery on 6/2/21    Symptoms have been present for:  6 day(s)    Has patient previously been seen for this? No    By : N/A    Date: N/A    Are there any new or worsening symptoms? No      Action Taken: Message routed to:  [facility] ([facility] ortho    Travel Screening: Not Applicable

## 2021-06-18 ENCOUNTER — TELEPHONE (OUTPATIENT)
Dept: ORTHOPEDICS | Facility: CLINIC | Age: 66
End: 2021-06-18

## 2021-06-18 ENCOUNTER — OFFICE VISIT (OUTPATIENT)
Dept: ORTHOPEDICS | Facility: CLINIC | Age: 66
End: 2021-06-18
Payer: COMMERCIAL

## 2021-06-18 ENCOUNTER — ANCILLARY PROCEDURE (OUTPATIENT)
Dept: GENERAL RADIOLOGY | Facility: CLINIC | Age: 66
End: 2021-06-18
Attending: ORTHOPAEDIC SURGERY
Payer: COMMERCIAL

## 2021-06-18 ENCOUNTER — NURSE TRIAGE (OUTPATIENT)
Dept: FAMILY MEDICINE | Facility: CLINIC | Age: 66
End: 2021-06-18

## 2021-06-18 VITALS — HEIGHT: 67 IN | BODY MASS INDEX: 40.34 KG/M2 | WEIGHT: 257 LBS

## 2021-06-18 DIAGNOSIS — M41.50 DEGENERATIVE SCOLIOSIS IN ADULT PATIENT: ICD-10-CM

## 2021-06-18 DIAGNOSIS — M54.16 LUMBAR RADICULOPATHY: ICD-10-CM

## 2021-06-18 DIAGNOSIS — Z98.1 S/P SPINAL FUSION: ICD-10-CM

## 2021-06-18 DIAGNOSIS — Z98.1 S/P SPINAL FUSION: Primary | ICD-10-CM

## 2021-06-18 PROCEDURE — 72082 X-RAY EXAM ENTIRE SPI 2/3 VW: CPT | Performed by: STUDENT IN AN ORGANIZED HEALTH CARE EDUCATION/TRAINING PROGRAM

## 2021-06-18 PROCEDURE — 77073 BONE LENGTH STUDIES: CPT | Performed by: STUDENT IN AN ORGANIZED HEALTH CARE EDUCATION/TRAINING PROGRAM

## 2021-06-18 PROCEDURE — 99024 POSTOP FOLLOW-UP VISIT: CPT | Performed by: ORTHOPAEDIC SURGERY

## 2021-06-18 ASSESSMENT — MIFFLIN-ST. JEOR: SCORE: 1748.37

## 2021-06-18 NOTE — LETTER
6/18/2021         RE: Madyson Murray  [address]  [place] [address]        Dear Colleague,    Thank you for referring your patient, Madyson Murray, to the [facility] [place]. Please see a copy of my visit note below.    Spine Surgery Return Clinic Visit      Chief Complaint:   RECHECK (2 week follow up for wound check and suture removal. patient is having some radicular symptoms since the surgery. )      Interval HPI:  Symptom Profile Including: location of symptoms, onset, severity, exacerbating/alleviating factors, previous treatments:        Madyson Murray is a 66 year old female who returns 2 weeks postop for suture check.    She says she notes that she is standing up much straighter.  She is walking with a walker quite regularly.  She feels like the right thigh is a little bit heavy or numb.  She has not yet started therapy.  No wound issues.          Past Medical History:     Past Medical History:   Diagnosis Date     Chronic bilateral low back pain without sciatica      Degenerative scoliosis      Lumbar radiculopathy      Obesity      DAE (obstructive sleep apnea)      Pulmonary hypertension (H)     last echo normal     Tricuspid regurgitation      Venous (peripheral) insufficiency             Past Surgical History:     Past Surgical History:   Procedure Laterality Date     JOINT REPLACEMENT Right      JOINT REPLACEMENT Left      OPTICAL TRACKING SYSTEM FUSION SPINE POSTERIOR LUMBAR THREE+ LEVELS N/A 6/2/2021    Procedure: Thoracic 11 to Pelvis posterior instrumented fusion with Lumbar 2 to 4 and 5 to 1  interbody fusion with lee stoddard osteotomy 4-5 and 3 column osteotomy 2-3 and 5-1 with O-Arm/Stealth Navigation, use of allograft, local autograft, bone morphogenic protein,;  Surgeon: Gab León MD;  Location: [facility] OR     ORTHOPEDIC SURGERY      2010 and 2012, total knees     TUBAL LIGATION              Social History:     Social History  "    Tobacco Use     Smoking status: Former Smoker     Packs/day: 1.00     Years: 15.00     Pack years: 15.00     Types: Cigarettes     Smokeless tobacco: Never Used     Tobacco comment: quit around age 32   Substance Use Topics     Alcohol use: Yes     Comment: 2-4 drinks couple times per week            Family History:     Family History   Problem Relation Age of Onset     Hypertension Mother      Diabetes Father      Hypertension Father      Parkinsonism Father      Diabetes Sister      Hypertension Sister      Sleep Apnea Sister      Sleep Apnea Brother             Allergies:     Allergies   Allergen Reactions     Adhesive [Liquid Adhesive]             Medications:     Current Outpatient Medications   Medication     acetaminophen (TYLENOL) 325 MG tablet     losartan (COZAAR) 25 MG tablet     methocarbamol (ROBAXIN) 750 MG tablet     oxyCODONE (ROXICODONE) 5 MG tablet     polyethylene glycol (MIRALAX) 17 g packet     senna-docusate (SENOKOT-S/PERICOLACE) 8.6-50 MG tablet     No current facility-administered medications for this visit.              Review of Systems:   A focused musculoskeletal and neurologic ROS was performed with pertinent positives and negatives noted in the HPI.  Additional systems were also reviewed and are documented at the bottom of the note.         Physical Exam:   Vitals: Ht 1.702 m (5' 7\")   Wt 116.6 kg (257 lb)   BMI 40.25 kg/m    Musculoskeletal, Neurologic, and Spine:          Lumbar Spine:    Appearance - No gross stepoffs or deformities    Motor -     L2-3: Hip flexion 3/5 R and 5/5 L strength          L3/4:  Knee extension R 5/5 and L 5/5 strength         L4/5:  Foot dorsiflexion R 5/5 L 5/5 and       EHL dorsiflexion R 4/5 L 4/5 strength         S1:  Plantarflexion/Peroneal Muscles  R 5/5 and L 5/5 strength    Sensation: intact to light touch L3-S1 distribution BLE      Hip Exam:  No pain with hip log roll and no tenderness over the greater trochanters.    Alignment:  Patient " stands with a neutral standing sagittal balance.           Imaging:     None today       Assessment and Plan:     66 year old female with Expected recovery at this point.  Incision healing well.       Continue to work on strengthening and walking.  F/U in 4 weeks with films.        Respectfully,  Gab León MD  Spine Surgery  [facility]

## 2021-06-18 NOTE — PROGRESS NOTES
Spine Surgery Return Clinic Visit      Chief Complaint:   RECHECK (2 week follow up for wound check and suture removal. patient is having some radicular symptoms since the surgery. )      Interval HPI:  Symptom Profile Including: location of symptoms, onset, severity, exacerbating/alleviating factors, previous treatments:        Madyson Murray is a 66 year old female who returns 2 weeks postop for suture check.    She says she notes that she is standing up much straighter.  She is walking with a walker quite regularly.  She feels like the right thigh is a little bit heavy or numb.  She has not yet started therapy.  No wound issues.          Past Medical History:     Past Medical History:   Diagnosis Date     Chronic bilateral low back pain without sciatica      Degenerative scoliosis      Lumbar radiculopathy      Obesity      DAE (obstructive sleep apnea)      Pulmonary hypertension (H)     last echo normal     Tricuspid regurgitation      Venous (peripheral) insufficiency             Past Surgical History:     Past Surgical History:   Procedure Laterality Date     JOINT REPLACEMENT Right      JOINT REPLACEMENT Left      OPTICAL TRACKING SYSTEM FUSION SPINE POSTERIOR LUMBAR THREE+ LEVELS N/A 6/2/2021    Procedure: Thoracic 11 to Pelvis posterior instrumented fusion with Lumbar 2 to 4 and 5 to 1  interbody fusion with lee stoddard osteotomy 4-5 and 3 column osteotomy 2-3 and 5-1 with O-Arm/Stealth Navigation, use of allograft, local autograft, bone morphogenic protein,;  Surgeon: Gab León MD;  Location: [facility] OR     ORTHOPEDIC SURGERY      2010 and 2012, total knees     TUBAL LIGATION              Social History:     Social History     Tobacco Use     Smoking status: Former Smoker     Packs/day: 1.00     Years: 15.00     Pack years: 15.00     Types: Cigarettes     Smokeless tobacco: Never Used     Tobacco comment: quit around age 32   Substance Use Topics     Alcohol use: Yes     Comment: 2-4 drinks  "couple times per week            Family History:     Family History   Problem Relation Age of Onset     Hypertension Mother      Diabetes Father      Hypertension Father      Parkinsonism Father      Diabetes Sister      Hypertension Sister      Sleep Apnea Sister      Sleep Apnea Brother             Allergies:     Allergies   Allergen Reactions     Adhesive [Liquid Adhesive]             Medications:     Current Outpatient Medications   Medication     acetaminophen (TYLENOL) 325 MG tablet     losartan (COZAAR) 25 MG tablet     methocarbamol (ROBAXIN) 750 MG tablet     oxyCODONE (ROXICODONE) 5 MG tablet     polyethylene glycol (MIRALAX) 17 g packet     senna-docusate (SENOKOT-S/PERICOLACE) 8.6-50 MG tablet     No current facility-administered medications for this visit.              Review of Systems:   A focused musculoskeletal and neurologic ROS was performed with pertinent positives and negatives noted in the HPI.  Additional systems were also reviewed and are documented at the bottom of the note.         Physical Exam:   Vitals: Ht 1.702 m (5' 7\")   Wt 116.6 kg (257 lb)   BMI 40.25 kg/m    Musculoskeletal, Neurologic, and Spine:          Lumbar Spine:    Appearance - No gross stepoffs or deformities    Motor -     L2-3: Hip flexion 3/5 R and 5/5 L strength          L3/4:  Knee extension R 5/5 and L 5/5 strength         L4/5:  Foot dorsiflexion R 5/5 L 5/5 and       EHL dorsiflexion R 4/5 L 4/5 strength         S1:  Plantarflexion/Peroneal Muscles  R 5/5 and L 5/5 strength    Sensation: intact to light touch L3-S1 distribution BLE      Hip Exam:  No pain with hip log roll and no tenderness over the greater trochanters.    Alignment:  Patient stands with a neutral standing sagittal balance.           Imaging:     None today       Assessment and Plan:     66 year old female with Expected recovery at this point.  Incision healing well.       Continue to work on strengthening and walking.  F/U in 4 weeks with films. "        Respectfully,  Gab León MD  Spine Surgery  [facility]

## 2021-06-18 NOTE — TELEPHONE ENCOUNTER
"Patient saw back surgeon today, Dr. León for follow-up. Per patient, Dr. León advised patient to call Dr. Ken to be started on diuretic for foot/ankle swelling. Patient notes \"prickly feeling\". Patient is calling for medication. Pharmacy pended.    Routing to provider to review and advise.     LILIANA GomezN, RN  [place] [facility]     "

## 2021-06-18 NOTE — TELEPHONE ENCOUNTER
RN called and left patient a VM. During today's visit, patient's sutures removed. Tolerated suture removal. Mild redness and swelling. Patient has been icing. PT order is placed. Patient will start pt for lower leg extremity strengthening and gait training. No repetitive bending or twisting.   RN reminded patient does indeed has a return appt on July 13.  RN provided call back number.    Ca Freeman RN

## 2021-06-18 NOTE — NURSING NOTE
"Reason For Visit:   Chief Complaint   Patient presents with     RECHECK     2 week follow up for wound check and suture removal. patient is having some radicular symptoms since the surgery.        Ht 1.702 m (5' 7\")   Wt 116.6 kg (257 lb)   BMI 40.25 kg/m           Marcos Burnham ATC  "

## 2021-06-18 NOTE — TELEPHONE ENCOUNTER
Patient states swelling is from toes to mid-calf. Right is more swollen than the left.     Advised ED visit to rule out DVT. Patient refused. Just saw Dr. León today and he didn't think it was emergent. Advised that blood clot can travel to lungs and be life threatening. Patient understands and wishes to schedule visit for Monday.     CALL BACK IF:  * Swelling persists over 3 days  * Swelling becomes red or painful to the touch  * You become worse  * Develop chest pain or difficulty breathing    Appointment scheduled.     Esther Amaya RN  [facility]    Reason for Disposition    Thigh, calf, or ankle swelling in both legs, but one side is definitely more swollen (Exception: longstanding difference between legs)    Additional Information    Negative: Chest pain    Negative: Small area of swelling and followed an insect bite to the area    Negative: Followed a knee injury    Negative: Ankle or foot injury    Negative: Pregnant with leg swelling or edema    Negative: Difficulty breathing at rest    Negative: Entire foot is cool or blue in comparison to other side    Negative: Thigh, calf, or ankle swelling in only one leg    Negative: Thigh or calf pain and only 1 side and present > 1 hour    Negative: SEVERE swelling (e.g., swelling extends above knee, entire leg is swollen, weeping fluid)    Protocols used: LEG SWELLING AND EDEMA-A-OH

## 2021-06-21 ENCOUNTER — OFFICE VISIT (OUTPATIENT)
Dept: FAMILY MEDICINE | Facility: CLINIC | Age: 66
End: 2021-06-21
Payer: COMMERCIAL

## 2021-06-21 VITALS
DIASTOLIC BLOOD PRESSURE: 62 MMHG | SYSTOLIC BLOOD PRESSURE: 140 MMHG | HEART RATE: 84 BPM | WEIGHT: 253 LBS | HEIGHT: 67 IN | RESPIRATION RATE: 14 BRPM | TEMPERATURE: 98 F | OXYGEN SATURATION: 99 % | BODY MASS INDEX: 39.71 KG/M2

## 2021-06-21 DIAGNOSIS — R22.43 LOCALIZED SWELLING OF BOTH LOWER LEGS: Primary | ICD-10-CM

## 2021-06-21 PROCEDURE — 99213 OFFICE O/P EST LOW 20 MIN: CPT | Performed by: NURSE PRACTITIONER

## 2021-06-21 RX ORDER — FUROSEMIDE 40 MG
40 TABLET ORAL DAILY
Qty: 3 TABLET | Refills: 0 | Status: SHIPPED | OUTPATIENT
Start: 2021-06-21 | End: 2021-08-18

## 2021-06-21 ASSESSMENT — MIFFLIN-ST. JEOR: SCORE: 1730.23

## 2021-06-21 NOTE — PROGRESS NOTES
"    Assessment & Plan   Problem List Items Addressed This Visit     None      Visit Diagnoses     Localized swelling of both lower legs    -  Primary    Relevant Medications    furosemide (LASIX) 40 MG tablet         Appears to be lower leg edema given exam and presentation especially in light of IVF and low albumin during hospitalization. Discussed adequate water intake, up protein in diet and trial 3 days lasix. If ongoing swelling notify clinic. Discussed red flags.    Prescription drug management  I spent a total of 16 minutes on the day of the visit.   Time spent doing chart review, history and exam, documentation and further activities per the note       See Patient Instructions    No follow-ups on file.    Ting Rodrigues, CNP  [facility] [place]    Nadja Coughlin is a 66 year old who presents for the following health issues     HPI     Concern - Leg swelling  Onset: started after back surgery on the June 2 2021 -   saw surgeon on Friday he told her to see PCP   Description: bilateral leg swelling RT worse per pt   Progression of Symptoms:  improving  Accompanying Signs & Symptoms: pain in rt leg - feels like pins and needles   Previous history of similar problem: with knee surgery   Therapies tried and outcome:  none     Legs appear equally swollen mid calf to ankle. Feel tight. Right feels a bit worse. No pain except radicular pain in right leg post op. Patient states they did more work on right side due to arthritis.     No redness and no localized pain in right or left leg.         Review of Systems   Constitutional, HEENT, cardiovascular, pulmonary, GI, , musculoskeletal, neuro, skin, endocrine and psych systems are negative, except as otherwise noted.      Objective    BP (!) 140/62   Pulse 84   Temp 98  F (36.7  C) (Tympanic)   Resp 14   Ht 1.702 m (5' 7\")   Wt 114.8 kg (253 lb)   SpO2 99%   BMI 39.63 kg/m    Body mass index is 39.63 kg/m .  Physical Exam   GENERAL: healthy, " alert and no distress  NECK: no adenopathy, no asymmetry, masses, or scars and thyroid normal to palpation  RESP: lungs clear to auscultation - no rales, rhonchi or wheezes  CV: regular rate and rhythm, normal S1 S2, no S3 or S4, no murmur, click or rub, no peripheral edema and peripheral pulses strong  MS: +2 edema to left and right lower legs and ankles.   SKIN: no suspicious lesions or rashes    Admission on 06/02/2021, Discharged on 06/07/2021   Component Date Value Ref Range Status     Glucose 06/02/2021 96  70 - 99 mg/dL Final     Lactic Acid 06/02/2021 0.8  0.7 - 2.0 mmol/L Final     pH Arterial 06/02/2021 7.39  7.35 - 7.45 pH Final     pCO2 Arterial 06/02/2021 42  35 - 45 mm Hg Final     pO2 Arterial 06/02/2021 152* 80 - 105 mm Hg Final     Bicarbonate Arterial 06/02/2021 26  21 - 28 mmol/L Final     Base Excess Art 06/02/2021 0.5  mmol/L Final    Reference range:  -9.0 to 1.8     FIO2 06/02/2021 31   Final     Sodium 06/02/2021 139  133 - 144 mmol/L Final     Potassium 06/02/2021 4.1  3.4 - 5.3 mmol/L Final     Hemoglobin 06/02/2021 11.9  11.7 - 15.7 g/dL Final     Glucose 06/02/2021 128* 70 - 99 mg/dL Final     Calcium Ionized Whole Blood 06/02/2021 4.8  4.4 - 5.2 mg/dL Final     pH Arterial 06/02/2021 7.41  7.35 - 7.45 pH Final     pCO2 Arterial 06/02/2021 40  35 - 45 mm Hg Final     pO2 Arterial 06/02/2021 132* 80 - 105 mm Hg Final     Bicarbonate Arterial 06/02/2021 25  21 - 28 mmol/L Final     Base Excess Art 06/02/2021 0.3  mmol/L Final    Reference range:  -9.0 to 1.8     FIO2 06/02/2021 30   Final     Sodium 06/02/2021 139  133 - 144 mmol/L Final     Potassium 06/02/2021 4.3  3.4 - 5.3 mmol/L Final     Hemoglobin 06/02/2021 10.9* 11.7 - 15.7 g/dL Final     Glucose 06/02/2021 130* 70 - 99 mg/dL Final     Calcium Ionized Whole Blood 06/02/2021 4.5  4.4 - 5.2 mg/dL Final     Lactic Acid 06/02/2021 1.0  0.7 - 2.0 mmol/L Final     Lactic Acid 06/02/2021 1.0  0.7 - 2.0 mmol/L Final     pH Arterial  06/02/2021 7.41  7.35 - 7.45 pH Final     pCO2 Arterial 06/02/2021 39  35 - 45 mm Hg Final     pO2 Arterial 06/02/2021 162* 80 - 105 mm Hg Final     Bicarbonate Arterial 06/02/2021 24  21 - 28 mmol/L Final     Base Deficit Art 06/02/2021 0.2  mmol/L Final    Reference range:  -9.0 to 1.8     FIO2 06/02/2021 29   Final     Sodium 06/02/2021 138  133 - 144 mmol/L Final     Potassium 06/02/2021 4.2  3.4 - 5.3 mmol/L Final     Hemoglobin 06/02/2021 10.4* 11.7 - 15.7 g/dL Final     Glucose 06/02/2021 152* 70 - 99 mg/dL Final     Calcium Ionized Whole Blood 06/02/2021 4.4  4.4 - 5.2 mg/dL Final     pH Arterial 06/02/2021 7.41  7.35 - 7.45 pH Final     pCO2 Arterial 06/02/2021 38  35 - 45 mm Hg Final     pO2 Arterial 06/02/2021 136* 80 - 105 mm Hg Final     Bicarbonate Arterial 06/02/2021 24  21 - 28 mmol/L Final     Base Deficit Art 06/02/2021 0.6  mmol/L Final    Reference range:  -9.0 to 1.8     FIO2 06/02/2021 30   Final     Sodium 06/02/2021 138  133 - 144 mmol/L Final     Potassium 06/02/2021 4.1  3.4 - 5.3 mmol/L Final     Hemoglobin 06/02/2021 10.0* 11.7 - 15.7 g/dL Final     Glucose 06/02/2021 170* 70 - 99 mg/dL Final     Calcium Ionized Whole Blood 06/02/2021 4.3* 4.4 - 5.2 mg/dL Final     Lactic Acid 06/02/2021 1.0  0.7 - 2.0 mmol/L Final     R time until clot forms 06/02/2021 4.1* 5 - 10 Minute Final    Comment: TEG Thrombelastograph Coagulation Analyser TEG - 5000 Series testing is   indicated for use with adult patients where an evaluation of their blood   coagulation properties is desired.  Coagulation evaluations are commonly used to assess clinical conditions such   as post-operative hemorrhage and/or thrombosis during and following   cardiovascular surgery, organ transplantation, trauma, and cardiology   procedures.       K time to spec clot strength 06/02/2021 1.0  1 - 3 Minute Final     Angle rate of clot strength 06/02/2021 74.9* 53 - 72 Degrees Final     MA maximum clot strength 06/02/2021 68.8  50 -  70 mm Final     CI hypercoagulation index 06/02/2021 3.3* 0.0 - 3.0 Ratio Final     G actual clot strength 06/02/2021 11.0  4.5 - 11.0 Kd/sc Final     LY30 lysis at 30 minutes 06/02/2021 0.3  0 - 8 % Final     LY60 lysis at 60 minutes 06/02/2021 2.4  0 - 15 % Final     pH Arterial 06/02/2021 7.39  7.35 - 7.45 pH Final     pCO2 Arterial 06/02/2021 39  35 - 45 mm Hg Final     pO2 Arterial 06/02/2021 126* 80 - 105 mm Hg Final     Bicarbonate Arterial 06/02/2021 24  21 - 28 mmol/L Final     Base Deficit Art 06/02/2021 1.0  mmol/L Final    Reference range:  -9.0 to 1.8     FIO2 06/02/2021 FIO2 30%   Final     Sodium 06/02/2021 136  133 - 144 mmol/L Final     Potassium 06/02/2021 4.5  3.4 - 5.3 mmol/L Final     Hemoglobin 06/02/2021 10.8* 11.7 - 15.7 g/dL Final     Glucose 06/02/2021 183* 70 - 99 mg/dL Final     Calcium Ionized Whole Blood 06/02/2021 4.5  4.4 - 5.2 mg/dL Final     Fibrinogen 06/02/2021 214  200 - 420 mg/dL Final     Hematocrit 06/02/2021 32.3* 35.0 - 47.0 % Final     INR 06/02/2021 1.17* 0.86 - 1.14 Final     Platelet Count 06/02/2021 225  150 - 450 10e9/L Final     PTT 06/02/2021 26  22 - 37 sec Final     pH Arterial 06/02/2021 7.39  7.35 - 7.45 pH Final     pCO2 Arterial 06/02/2021 38  35 - 45 mm Hg Final     pO2 Arterial 06/02/2021 132* 80 - 105 mm Hg Final     Bicarbonate Arterial 06/02/2021 23  21 - 28 mmol/L Final     Base Deficit Art 06/02/2021 1.6  mmol/L Final    Reference range:  -9.0 to 1.8     FIO2 06/02/2021 FIO2 30%   Final     Sodium 06/02/2021 136  133 - 144 mmol/L Final     Potassium 06/02/2021 4.5  3.4 - 5.3 mmol/L Final     Hemoglobin 06/02/2021 11.0* 11.7 - 15.7 g/dL Final     Glucose 06/02/2021 171* 70 - 99 mg/dL Final     Calcium Ionized Whole Blood 06/02/2021 4.6  4.4 - 5.2 mg/dL Final     Hematocrit 06/02/2021 33.9* 35.0 - 47.0 % Final     Glucose 06/02/2021 170* 70 - 99 mg/dL Final     Glucose 06/02/2021 163* 70 - 99 mg/dL Final     Glucose 06/02/2021 127* 70 - 99 mg/dL Final      WBC 06/02/2021 14.7* 4.0 - 11.0 10e9/L Final     RBC Count 06/02/2021 3.66* 3.8 - 5.2 10e12/L Final     Hemoglobin 06/02/2021 10.7* 11.7 - 15.7 g/dL Final     Hematocrit 06/02/2021 32.9* 35.0 - 47.0 % Final     MCV 06/02/2021 90  78 - 100 fl Final     MCH 06/02/2021 29.2  26.5 - 33.0 pg Final     MCHC 06/02/2021 32.5  31.5 - 36.5 g/dL Final     RDW 06/02/2021 13.2  10.0 - 15.0 % Final     Platelet Count 06/02/2021 161  150 - 450 10e9/L Final     Sodium 06/03/2021 134  133 - 144 mmol/L Final     Potassium 06/03/2021 4.9  3.4 - 5.3 mmol/L Final     Chloride 06/03/2021 101  94 - 109 mmol/L Final     Carbon Dioxide 06/03/2021 24  20 - 32 mmol/L Final     Anion Gap 06/03/2021 9  3 - 14 mmol/L Final     Glucose 06/03/2021 137* 70 - 99 mg/dL Final     Urea Nitrogen 06/03/2021 17  7 - 30 mg/dL Final     Creatinine 06/03/2021 0.71  0.52 - 1.04 mg/dL Final     GFR Estimate 06/03/2021 >90  >60 mL/min/[1.73_m2] Final    Comment: Non  GFR Calc  Starting 12/18/2018, serum creatinine based estimated GFR (eGFR) will be   calculated using the Chronic Kidney Disease Epidemiology Collaboration   (CKD-EPI) equation.       GFR Estimate If Black 06/03/2021 >90  >60 mL/min/[1.73_m2] Final    Comment:  GFR Calc  Starting 12/18/2018, serum creatinine based estimated GFR (eGFR) will be   calculated using the Chronic Kidney Disease Epidemiology Collaboration   (CKD-EPI) equation.       Calcium 06/03/2021 8.0* 8.5 - 10.1 mg/dL Final     WBC 06/03/2021 9.7  4.0 - 11.0 10e9/L Final     RBC Count 06/03/2021 3.12* 3.8 - 5.2 10e12/L Final     Hemoglobin 06/03/2021 9.1* 11.7 - 15.7 g/dL Final     Hematocrit 06/03/2021 28.3* 35.0 - 47.0 % Final     MCV 06/03/2021 91  78 - 100 fl Final     MCH 06/03/2021 29.2  26.5 - 33.0 pg Final     MCHC 06/03/2021 32.2  31.5 - 36.5 g/dL Final     RDW 06/03/2021 13.4  10.0 - 15.0 % Final     Platelet Count 06/03/2021 146* 150 - 450 10e9/L Final     Diff Method 06/03/2021 Automated  Method   Final     % Neutrophils 06/03/2021 73.0  % Final     % Lymphocytes 06/03/2021 16.0  % Final     % Monocytes 06/03/2021 10.2  % Final     % Eosinophils 06/03/2021 0.0  % Final     % Basophils 06/03/2021 0.2  % Final     % Immature Granulocytes 06/03/2021 0.6  % Final     Nucleated RBCs 06/03/2021 0  0 /100 Final     Absolute Neutrophil 06/03/2021 7.1  1.6 - 8.3 10e9/L Final     Absolute Lymphocytes 06/03/2021 1.6  0.8 - 5.3 10e9/L Final     Absolute Monocytes 06/03/2021 1.0  0.0 - 1.3 10e9/L Final     Absolute Eosinophils 06/03/2021 0.0  0.0 - 0.7 10e9/L Final     Absolute Basophils 06/03/2021 0.0  0.0 - 0.2 10e9/L Final     Abs Immature Granulocytes 06/03/2021 0.1  0 - 0.4 10e9/L Final     Absolute Nucleated RBC 06/03/2021 0.0   Final     Hemoglobin 06/03/2021 8.8* 11.7 - 15.7 g/dL Final     WBC 06/04/2021 6.6  4.0 - 11.0 10e9/L Final     RBC Count 06/04/2021 2.49* 3.8 - 5.2 10e12/L Final     Hemoglobin 06/04/2021 7.3* 11.7 - 15.7 g/dL Final     Hematocrit 06/04/2021 22.3* 35.0 - 47.0 % Final     MCV 06/04/2021 90  78 - 100 fl Final     MCH 06/04/2021 29.3  26.5 - 33.0 pg Final     MCHC 06/04/2021 32.7  31.5 - 36.5 g/dL Final     RDW 06/04/2021 13.2  10.0 - 15.0 % Final     Platelet Count 06/04/2021 111* 150 - 450 10e9/L Final     Diff Method 06/04/2021 Automated Method   Final     % Neutrophils 06/04/2021 73.8  % Final     % Lymphocytes 06/04/2021 14.6  % Final     % Monocytes 06/04/2021 10.5  % Final     % Eosinophils 06/04/2021 0.2  % Final     % Basophils 06/04/2021 0.3  % Final     % Immature Granulocytes 06/04/2021 0.6  % Final     Nucleated RBCs 06/04/2021 0  0 /100 Final     Absolute Neutrophil 06/04/2021 4.9  1.6 - 8.3 10e9/L Final     Absolute Lymphocytes 06/04/2021 1.0  0.8 - 5.3 10e9/L Final     Absolute Monocytes 06/04/2021 0.7  0.0 - 1.3 10e9/L Final     Absolute Eosinophils 06/04/2021 0.0  0.0 - 0.7 10e9/L Final     Absolute Basophils 06/04/2021 0.0  0.0 - 0.2 10e9/L Final     Abs Immature  Granulocytes 06/04/2021 0.0  0 - 0.4 10e9/L Final     Absolute Nucleated RBC 06/04/2021 0.0   Final     Sodium 06/04/2021 138  133 - 144 mmol/L Final     Potassium 06/04/2021 4.2  3.4 - 5.3 mmol/L Final     Chloride 06/04/2021 105  94 - 109 mmol/L Final     Carbon Dioxide 06/04/2021 30  20 - 32 mmol/L Final     Anion Gap 06/04/2021 3  3 - 14 mmol/L Final     Glucose 06/04/2021 108* 70 - 99 mg/dL Final     Urea Nitrogen 06/04/2021 8  7 - 30 mg/dL Final     Creatinine 06/04/2021 0.56  0.52 - 1.04 mg/dL Final     GFR Estimate 06/04/2021 >90  >60 mL/min/[1.73_m2] Final    Comment: Non  GFR Calc  Starting 12/18/2018, serum creatinine based estimated GFR (eGFR) will be   calculated using the Chronic Kidney Disease Epidemiology Collaboration   (CKD-EPI) equation.       GFR Estimate If Black 06/04/2021 >90  >60 mL/min/[1.73_m2] Final    Comment:  GFR Calc  Starting 12/18/2018, serum creatinine based estimated GFR (eGFR) will be   calculated using the Chronic Kidney Disease Epidemiology Collaboration   (CKD-EPI) equation.       Calcium 06/04/2021 8.0* 8.5 - 10.1 mg/dL Final     Bilirubin Total 06/04/2021 1.1  0.2 - 1.3 mg/dL Final     Albumin 06/04/2021 2.3* 3.4 - 5.0 g/dL Final     Protein Total 06/04/2021 4.9* 6.8 - 8.8 g/dL Final     Alkaline Phosphatase 06/04/2021 50  40 - 150 U/L Final     ALT 06/04/2021 30  0 - 50 U/L Final     AST 06/04/2021 64* 0 - 45 U/L Final     Hemoglobin 06/04/2021 7.5* 11.7 - 15.7 g/dL Final     Hemoglobin 06/06/2021 7.5* 11.7 - 15.7 g/dL Final     Hemoglobin 06/07/2021 7.5* 11.7 - 15.7 g/dL Final

## 2021-06-29 ENCOUNTER — THERAPY VISIT (OUTPATIENT)
Dept: PHYSICAL THERAPY | Facility: CLINIC | Age: 66
End: 2021-06-29
Attending: ORTHOPAEDIC SURGERY
Payer: COMMERCIAL

## 2021-06-29 DIAGNOSIS — Z98.1 S/P SPINAL FUSION: ICD-10-CM

## 2021-06-29 DIAGNOSIS — M41.50 DEGENERATIVE SCOLIOSIS IN ADULT PATIENT: ICD-10-CM

## 2021-06-29 DIAGNOSIS — M54.16 LUMBAR RADICULOPATHY: ICD-10-CM

## 2021-06-29 PROCEDURE — 97110 THERAPEUTIC EXERCISES: CPT | Mod: GP | Performed by: PHYSICAL THERAPIST

## 2021-06-29 PROCEDURE — 97161 PT EVAL LOW COMPLEX 20 MIN: CPT | Mod: GP | Performed by: PHYSICAL THERAPIST

## 2021-06-29 NOTE — PROGRESS NOTES
Physical Therapy Initial Evaluation: Subjective History  Date of Surgery: 6/2/21.    Surgical Procedure/Limb: s/p T12-S1 fusion  Surgeon Name: Dr. 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    Patient has the following significant findings with corresponding treatment plan.                Diagnosis 1:  S/p T12-S1 fusion    Pain -  hot/cold therapy, manual therapy, splint/taping/bracing/orthotics, self management, education and home program  Decreased ROM/flexibility - manual therapy and therapeutic exercise  Decreased joint mobility - manual therapy and therapeutic exercise  Decreased strength - therapeutic exercise and therapeutic activities  Impaired balance - neuro re-education and therapeutic activities  Decreased proprioception - neuro re-education and therapeutic activities  Impaired gait - gait training  Impaired muscle performance - neuro re-education  Decreased function - therapeutic activities    Therapy Evaluation Codes:   1) History comprised of:   Personal factors that impact the plan of care:      None.     Comorbidity factors that impact the plan of care are:      None.     Medications impacting care: None.  2) Examination of Body Systems comprised of:   Body structures and functions that impact the plan of care:      Lumbar spine, Pelvis, Sacral illiac joint and Thoracic Spine.   Activity limitations that impact the plan of care are:      Dressing, Lifting, Sitting, Squatting/kneeling, Stairs, Standing and Walking.  3) Clinical presentation characteristics are:   Stable/Uncomplicated.  4) Decision-Making    Low complexity using standardized patient assessment instrument and/or measureable assessment of functional outcome.  Cumulative Therapy Evaluation is: Low complexity.    Previous and current functional limitations:  (See Goal Flow Sheet for this information)    Short term and Long term goals: (See Goal Flow Sheet for this information)     Communication ability:  Patient appears to be able to clearly communicate and understand verbal and written communication and follow directions correctly.  Treatment Explanation - The following has been discussed with the patient:   RX ordered/plan of care  Anticipated outcomes  Possible risks and side effects  This patient would benefit from PT intervention to resume normal activities.   Rehab potential is good.    Frequency:  1 X week, once daily  Duration:  for 8 weeks  Discharge Plan:  Achieve all LTG.  Independent in home treatment program.  Reach maximal therapeutic benefit.    Please refer to the daily flowsheet for treatment today, total treatment time and time spent performing 1:1 timed codes.

## 2021-07-06 ENCOUNTER — THERAPY VISIT (OUTPATIENT)
Dept: PHYSICAL THERAPY | Facility: CLINIC | Age: 66
End: 2021-07-06
Payer: COMMERCIAL

## 2021-07-06 DIAGNOSIS — Z98.1 S/P SPINAL FUSION: Primary | ICD-10-CM

## 2021-07-06 DIAGNOSIS — M99.43 CONNECTIVE TISSUE STENOSIS OF NEURAL CANAL OF LUMBAR REGION: ICD-10-CM

## 2021-07-06 DIAGNOSIS — G89.29 CHRONIC LEFT-SIDED LOW BACK PAIN WITHOUT SCIATICA: ICD-10-CM

## 2021-07-06 DIAGNOSIS — M54.50 CHRONIC LEFT-SIDED LOW BACK PAIN WITHOUT SCIATICA: ICD-10-CM

## 2021-07-06 DIAGNOSIS — M54.16 LUMBAR RADICULOPATHY: ICD-10-CM

## 2021-07-06 DIAGNOSIS — M41.50 DEGENERATIVE SCOLIOSIS IN ADULT PATIENT: ICD-10-CM

## 2021-07-06 PROCEDURE — 97110 THERAPEUTIC EXERCISES: CPT | Mod: GP | Performed by: PHYSICAL THERAPY ASSISTANT

## 2021-07-06 PROCEDURE — 97112 NEUROMUSCULAR REEDUCATION: CPT | Mod: GP | Performed by: PHYSICAL THERAPY ASSISTANT

## 2021-07-06 PROCEDURE — 97530 THERAPEUTIC ACTIVITIES: CPT | Mod: GP | Performed by: PHYSICAL THERAPY ASSISTANT

## 2021-07-08 DIAGNOSIS — Z98.1 S/P SPINAL FUSION: Primary | ICD-10-CM

## 2021-07-13 ENCOUNTER — THERAPY VISIT (OUTPATIENT)
Dept: PHYSICAL THERAPY | Facility: CLINIC | Age: 66
End: 2021-07-13
Payer: COMMERCIAL

## 2021-07-13 ENCOUNTER — ANCILLARY PROCEDURE (OUTPATIENT)
Dept: GENERAL RADIOLOGY | Facility: CLINIC | Age: 66
End: 2021-07-13
Attending: ORTHOPAEDIC SURGERY
Payer: COMMERCIAL

## 2021-07-13 ENCOUNTER — OFFICE VISIT (OUTPATIENT)
Dept: ORTHOPEDICS | Facility: CLINIC | Age: 66
End: 2021-07-13
Payer: COMMERCIAL

## 2021-07-13 VITALS — WEIGHT: 253 LBS | BODY MASS INDEX: 39.71 KG/M2 | HEIGHT: 67 IN

## 2021-07-13 DIAGNOSIS — M54.16 LUMBAR RADICULOPATHY: Primary | ICD-10-CM

## 2021-07-13 DIAGNOSIS — Z98.1 S/P SPINAL FUSION: Primary | ICD-10-CM

## 2021-07-13 PROCEDURE — 97112 NEUROMUSCULAR REEDUCATION: CPT | Mod: GP | Performed by: PHYSICAL THERAPY ASSISTANT

## 2021-07-13 PROCEDURE — 72082 X-RAY EXAM ENTIRE SPI 2/3 VW: CPT | Performed by: STUDENT IN AN ORGANIZED HEALTH CARE EDUCATION/TRAINING PROGRAM

## 2021-07-13 PROCEDURE — 97530 THERAPEUTIC ACTIVITIES: CPT | Mod: GP | Performed by: PHYSICAL THERAPY ASSISTANT

## 2021-07-13 PROCEDURE — 97110 THERAPEUTIC EXERCISES: CPT | Mod: GP | Performed by: PHYSICAL THERAPY ASSISTANT

## 2021-07-13 PROCEDURE — 77073 BONE LENGTH STUDIES: CPT | Performed by: STUDENT IN AN ORGANIZED HEALTH CARE EDUCATION/TRAINING PROGRAM

## 2021-07-13 PROCEDURE — 99024 POSTOP FOLLOW-UP VISIT: CPT | Mod: GC | Performed by: ORTHOPAEDIC SURGERY

## 2021-07-13 ASSESSMENT — MIFFLIN-ST. JEOR: SCORE: 1730.23

## 2021-07-13 NOTE — NURSING NOTE
"Reason For Visit:   Chief Complaint   Patient presents with     RECHECK     6 week POP T11- Pelvis DOS 6/2/21       Primary MD: Ga Ken Jr.  Ref. MD: Self     ?  No  Date of surgery: 6/2/21   Type of surgery: Thoracic 11 to Pelvis posterior instrumented fusion with Lumbar 2 to 4 and 5 to 1  interbody fusion with lee stoddard osteotomy 4-5 and 3 column osteotomy 2-3 and 5-1 with O-Arm/Stealth Navigation, use of allograft, local autograft, bone morphogenic protein,.  Smoker: No  Request smoking cessation information: No    Ht 1.702 m (5' 7\")   Wt 114.8 kg (253 lb)   BMI 39.63 kg/m           Oswestry (MARYSOL) Questionnaire    OSWESTRY DISABILITY INDEX 6/29/2021   Count 4   Sum 11   Oswestry Score (%) 55            Neck Disability Index (NDI) Questionnaire    No flowsheet data found.                Promis 10 Assessment    PROMIS 10 4/9/2021   In general, would you say your health is: Fair   In general, would you say your quality of life is: Poor   In general, how would you rate your physical health? Poor   In general, how would you rate your mental health, including your mood and your ability to think? Fair   In general, how would you rate your satisfaction with your social activities and relationships? Poor   In general, please rate how well you carry out your usual social activities and roles Poor   To what extent are you able to carry out your everyday physical activities such as walking, climbing stairs, carrying groceries, or moving a chair? A little   How often have you been bothered by emotional problems such as feeling anxious, depressed or irritable? Often   How would you rate your fatigue on average? Moderate   How would you rate your pain on average?   0 = No Pain  to  10 = Worst Imaginable Pain 8   In general, would you say your health is: 2   In general, would you say your quality of life is: 1   In general, how would you rate your physical health? 1   In general, how would you rate " your mental health, including your mood and your ability to think? 2   In general, how would you rate your satisfaction with your social activities and relationships? 1   In general, please rate how well you carry out your usual social activities and roles. (This includes activities at home, at work and in your community, and responsibilities as a parent, child, spouse, employee, friend, etc.) 1   To what extent are you able to carry out your everyday physical activities such as walking, climbing stairs, carrying groceries, or moving a chair? 2   In the past 7 days, how often have you been bothered by emotional problems such as feeling anxious, depressed, or irritable? 4   In the past 7 days, how would you rate your fatigue on average? 3   In the past 7 days, how would you rate your pain on average, where 0 means no pain, and 10 means worst imaginable pain? 8   Global Mental Health Score 6   Global Physical Health Score 8   PROMIS TOTAL - SUBSCORES 14   Some recent data might be hidden                Marcos Burnham ATC

## 2021-07-13 NOTE — LETTER
7/13/2021       RE: Madyson Murray  [address]  [place] [address]      Dear Colleague,    Thank you for referring your patient, Madyson Murray, to the [facility]. Please see a copy of my visit note below.    Spine Surgery Return Clinic Visit      Chief Complaint:   RECHECK (6 week POP T11- Pelvis DOS 6/2/21)      Interval HPI:  Symptom Profile Including: location of symptoms, onset, severity, exacerbating/alleviating factors, previous treatments:        Madyson Murray is a 66 year old female who is 6 weeks s/p Thoracic 11 to Pelvis posterior instrumented fusion with Lumbar 2 to 4 and 5 to 1  interbody fusion with lee stoddard osteotomy 4-5 and 3 column osteotomy 2-3 and 5-1 with O-Arm/Stealth Navigation, use of allograft, local autograft, bone morphogenic protein on 6/2/2021. Since surgery she is endorsing new right leg numbness encompassing the entire leg that started fairly immediately after surgery but is improving.  Associated with this is some weakness of the right lower extremity, which she also feels to be improving.  Overall however she is experiencing significant improvement in her low back pain and leg pain that she was experiencing prior to surgery. She has started physical therapy for her legs but has not started physical therapy yet for her back.             Past Medical History:     Past Medical History:   Diagnosis Date     Chronic bilateral low back pain without sciatica      Degenerative scoliosis      Lumbar radiculopathy      Obesity      DAE (obstructive sleep apnea)      Pulmonary hypertension (H)     last echo normal     Tricuspid regurgitation      Venous (peripheral) insufficiency             Past Surgical History:     Past Surgical History:   Procedure Laterality Date     JOINT REPLACEMENT Right      JOINT REPLACEMENT Left      OPTICAL TRACKING SYSTEM FUSION SPINE POSTERIOR LUMBAR THREE+ LEVELS N/A 6/2/2021    Procedure: Thoracic 11  "to Pelvis posterior instrumented fusion with Lumbar 2 to 4 and 5 to 1  interbody fusion with lee stoddard osteotomy 4-5 and 3 column osteotomy 2-3 and 5-1 with O-Arm/Stealth Navigation, use of allograft, local autograft, bone morphogenic protein,;  Surgeon: Gab León MD;  Location: [facility] OR     ORTHOPEDIC SURGERY      2010 and 2012, total knees     TUBAL LIGATION              Social History:     Social History     Tobacco Use     Smoking status: Former Smoker     Packs/day: 1.00     Years: 15.00     Pack years: 15.00     Types: Cigarettes     Smokeless tobacco: Never Used     Tobacco comment: quit around age 32   Substance Use Topics     Alcohol use: Yes     Comment: 2-4 drinks couple times per week            Family History:     Family History   Problem Relation Age of Onset     Hypertension Mother      Diabetes Father      Hypertension Father      Parkinsonism Father      Diabetes Sister      Hypertension Sister      Sleep Apnea Sister      Sleep Apnea Brother             Allergies:     Allergies   Allergen Reactions     Adhesive [Liquid Adhesive]             Medications:     Current Outpatient Medications   Medication     acetaminophen (TYLENOL) 325 MG tablet     furosemide (LASIX) 40 MG tablet     losartan (COZAAR) 25 MG tablet     methocarbamol (ROBAXIN) 750 MG tablet     oxyCODONE (ROXICODONE) 5 MG tablet     polyethylene glycol (MIRALAX) 17 g packet     senna-docusate (SENOKOT-S/PERICOLACE) 8.6-50 MG tablet     No current facility-administered medications for this visit.             Review of Systems:   A focused musculoskeletal and neurologic ROS was performed with pertinent positives and negatives noted in the HPI.  Additional systems were also reviewed and are documented at the bottom of the note.         Physical Exam:   Vitals: Ht 1.702 m (5' 7\")   Wt 114.8 kg (253 lb)   BMI 39.63 kg/m    Musculoskeletal, Neurologic, and Spine:     Thoracolumbar surgical incision is healing well with " approximated skin no wound breakdown, no erythema, no exudate no purulence, and no discharge     Lumbar Spine:    Appearance - No gross stepoffs or deformities    Motor -     L2-3: Hip flexion R 4/5  And L 5/5 strength          L3/4:  Knee extension R 5/5 and L 5/5 strength         L4/5:  Foot dorsiflexion R 4/5 L 5/5 and       EHL dorsiflexion R 4/5 L 4/5 strength         S1:  Plantarflexion  R 5/5 and L 5/5 strength    Sensation: intact to light touch L3-S1 distribution BLE, some diminished sensation in the plantar surface of the foot on the right side compared to the left      Neurologic:      REFLEXES Right Left   Patella 2+ 2+   Ankle jerk 2+ 2+   Babinski No upgoing great toe No upgoing great toe   Clonus 0 beats 0 beats       Alignment:  Patient stands with a neutral standing sagittal balance.             Imaging:   We ordered and independently reviewed new radiographs at this clinic visit. The results were discussed with the patient. Findings include:     Complete spine x-rays obtained today show intact hardware with no evidence of loosening as well as good global sagittal balance.         Assessment and Plan:     66 year old female with who returns to clinic for postsurgical evaluation following T11 to pelvis posterior instrumented fusion with L2-L4 and L5-S1 interbody fusion with SPO as well as 3 column osteotomies.  She is healing well, incision looks good, and has started physical therapy.  She has had some postoperative numbness in the right lower extremity that does not follow a single dermatomal distribution and is improving with an increased level of activity and physical therapy dedicated to the leg.     Plan:  Continue to advance with PT, gait training, wean off of walker as able, follow up in 3 months    Patient seen and discussed with Dr. León,  Who agrees with the plan noted above    Kenji Motta MD  Orthopedic Surgery PGY-1    Attending MD (Dr. Gab León) :  I reviewed and  verified the history and physical exam of the patient and discussed the patient's management with the other clinical providers involved in this patient's care including any involved residents or physicians assistants. I reviewed the above note and agree with the documented findings and plan of care, which were communicated to the patient.      Gab León MD

## 2021-07-13 NOTE — PROGRESS NOTES
Spine Surgery Return Clinic Visit      Chief Complaint:   RECHECK (6 week POP T11- Pelvis DOS 6/2/21)      Interval HPI:  Symptom Profile Including: location of symptoms, onset, severity, exacerbating/alleviating factors, previous treatments:        Madyson Murray is a 66 year old female who is 6 weeks s/p Thoracic 11 to Pelvis posterior instrumented fusion with Lumbar 2 to 4 and 5 to 1  interbody fusion with lee stoddard osteotomy 4-5 and 3 column osteotomy 2-3 and 5-1 with O-Arm/Stealth Navigation, use of allograft, local autograft, bone morphogenic protein on 6/2/2021. Since surgery she is endorsing new right leg numbness encompassing the entire leg that started fairly immediately after surgery but is improving.  Associated with this is some weakness of the right lower extremity, which she also feels to be improving.  Overall however she is experiencing significant improvement in her low back pain and leg pain that she was experiencing prior to surgery. She has started physical therapy for her legs but has not started physical therapy yet for her back.             Past Medical History:     Past Medical History:   Diagnosis Date     Chronic bilateral low back pain without sciatica      Degenerative scoliosis      Lumbar radiculopathy      Obesity      DAE (obstructive sleep apnea)      Pulmonary hypertension (H)     last echo normal     Tricuspid regurgitation      Venous (peripheral) insufficiency             Past Surgical History:     Past Surgical History:   Procedure Laterality Date     JOINT REPLACEMENT Right      JOINT REPLACEMENT Left      OPTICAL TRACKING SYSTEM FUSION SPINE POSTERIOR LUMBAR THREE+ LEVELS N/A 6/2/2021    Procedure: Thoracic 11 to Pelvis posterior instrumented fusion with Lumbar 2 to 4 and 5 to 1  interbody fusion with lee stoddard osteotomy 4-5 and 3 column osteotomy 2-3 and 5-1 with O-Arm/Stealth Navigation, use of allograft, local autograft, bone morphogenic protein,;  Surgeon:  "Gab León MD;  Location: [facility] OR     ORTHOPEDIC SURGERY      2010 and 2012, total knees     TUBAL LIGATION              Social History:     Social History     Tobacco Use     Smoking status: Former Smoker     Packs/day: 1.00     Years: 15.00     Pack years: 15.00     Types: Cigarettes     Smokeless tobacco: Never Used     Tobacco comment: quit around age 32   Substance Use Topics     Alcohol use: Yes     Comment: 2-4 drinks couple times per week            Family History:     Family History   Problem Relation Age of Onset     Hypertension Mother      Diabetes Father      Hypertension Father      Parkinsonism Father      Diabetes Sister      Hypertension Sister      Sleep Apnea Sister      Sleep Apnea Brother             Allergies:     Allergies   Allergen Reactions     Adhesive [Liquid Adhesive]             Medications:     Current Outpatient Medications   Medication     acetaminophen (TYLENOL) 325 MG tablet     furosemide (LASIX) 40 MG tablet     losartan (COZAAR) 25 MG tablet     methocarbamol (ROBAXIN) 750 MG tablet     oxyCODONE (ROXICODONE) 5 MG tablet     polyethylene glycol (MIRALAX) 17 g packet     senna-docusate (SENOKOT-S/PERICOLACE) 8.6-50 MG tablet     No current facility-administered medications for this visit.             Review of Systems:   A focused musculoskeletal and neurologic ROS was performed with pertinent positives and negatives noted in the HPI.  Additional systems were also reviewed and are documented at the bottom of the note.         Physical Exam:   Vitals: Ht 1.702 m (5' 7\")   Wt 114.8 kg (253 lb)   BMI 39.63 kg/m    Musculoskeletal, Neurologic, and Spine:     Thoracolumbar surgical incision is healing well with approximated skin no wound breakdown, no erythema, no exudate no purulence, and no discharge     Lumbar Spine:    Appearance - No gross stepoffs or deformities    Motor -     L2-3: Hip flexion R 4/5  And L 5/5 strength          L3/4:  Knee extension R 5/5 and L " 5/5 strength         L4/5:  Foot dorsiflexion R 4/5 L 5/5 and       EHL dorsiflexion R 4/5 L 4/5 strength         S1:  Plantarflexion  R 5/5 and L 5/5 strength    Sensation: intact to light touch L3-S1 distribution BLE, some diminished sensation in the plantar surface of the foot on the right side compared to the left      Neurologic:      REFLEXES Right Left   Patella 2+ 2+   Ankle jerk 2+ 2+   Babinski No upgoing great toe No upgoing great toe   Clonus 0 beats 0 beats       Alignment:  Patient stands with a neutral standing sagittal balance.             Imaging:   We ordered and independently reviewed new radiographs at this clinic visit. The results were discussed with the patient. Findings include:     Complete spine x-rays obtained today show intact hardware with no evidence of loosening as well as good global sagittal balance.         Assessment and Plan:     66 year old female with who returns to clinic for postsurgical evaluation following T11 to pelvis posterior instrumented fusion with L2-L4 and L5-S1 interbody fusion with SPO as well as 3 column osteotomies.  She is healing well, incision looks good, and has started physical therapy.  She has had some postoperative numbness in the right lower extremity that does not follow a single dermatomal distribution and is improving with an increased level of activity and physical therapy dedicated to the leg.     Plan:  Continue to advance with PT, gait training, wean off of walker as able, follow up in 3 months    Patient seen and discussed with Dr. León,  Who agrees with the plan noted above    Kenji Motta MD  Orthopedic Surgery PGY-1    Attending MD (Dr. Gab León) :  I reviewed and verified the history and physical exam of the patient and discussed the patient's management with the other clinical providers involved in this patient's care including any involved residents or physicians assistants. I reviewed the above note and agree with the  documented findings and plan of care, which were communicated to the patient.      Gab León MD

## 2021-07-20 ENCOUNTER — THERAPY VISIT (OUTPATIENT)
Dept: PHYSICAL THERAPY | Facility: CLINIC | Age: 66
End: 2021-07-20
Payer: COMMERCIAL

## 2021-07-20 DIAGNOSIS — M99.43 CONNECTIVE TISSUE STENOSIS OF NEURAL CANAL OF LUMBAR REGION: ICD-10-CM

## 2021-07-20 DIAGNOSIS — Z98.1 S/P SPINAL FUSION: Primary | ICD-10-CM

## 2021-07-20 DIAGNOSIS — M54.16 LUMBAR RADICULOPATHY: ICD-10-CM

## 2021-07-20 DIAGNOSIS — G89.29 CHRONIC LEFT-SIDED LOW BACK PAIN WITHOUT SCIATICA: ICD-10-CM

## 2021-07-20 DIAGNOSIS — M54.50 CHRONIC LEFT-SIDED LOW BACK PAIN WITHOUT SCIATICA: ICD-10-CM

## 2021-07-20 DIAGNOSIS — M41.50 DEGENERATIVE SCOLIOSIS IN ADULT PATIENT: ICD-10-CM

## 2021-07-20 PROCEDURE — 97112 NEUROMUSCULAR REEDUCATION: CPT | Mod: GP | Performed by: PHYSICAL THERAPY ASSISTANT

## 2021-07-20 PROCEDURE — 97530 THERAPEUTIC ACTIVITIES: CPT | Mod: GP | Performed by: PHYSICAL THERAPY ASSISTANT

## 2021-07-20 PROCEDURE — 97110 THERAPEUTIC EXERCISES: CPT | Mod: GP | Performed by: PHYSICAL THERAPY ASSISTANT

## 2021-07-28 ENCOUNTER — THERAPY VISIT (OUTPATIENT)
Dept: PHYSICAL THERAPY | Facility: CLINIC | Age: 66
End: 2021-07-28
Payer: COMMERCIAL

## 2021-07-28 DIAGNOSIS — Z98.1 S/P SPINAL FUSION: Primary | ICD-10-CM

## 2021-07-28 PROCEDURE — 97110 THERAPEUTIC EXERCISES: CPT | Mod: GP | Performed by: PHYSICAL THERAPIST

## 2021-07-28 PROCEDURE — 97112 NEUROMUSCULAR REEDUCATION: CPT | Mod: GP | Performed by: PHYSICAL THERAPIST

## 2021-08-17 ENCOUNTER — THERAPY VISIT (OUTPATIENT)
Dept: PHYSICAL THERAPY | Facility: CLINIC | Age: 66
End: 2021-08-17
Payer: COMMERCIAL

## 2021-08-17 DIAGNOSIS — M54.16 LUMBAR RADICULOPATHY: ICD-10-CM

## 2021-08-17 DIAGNOSIS — M54.50 CHRONIC LEFT-SIDED LOW BACK PAIN WITHOUT SCIATICA: ICD-10-CM

## 2021-08-17 DIAGNOSIS — M99.43 CONNECTIVE TISSUE STENOSIS OF NEURAL CANAL OF LUMBAR REGION: ICD-10-CM

## 2021-08-17 DIAGNOSIS — Z98.1 S/P SPINAL FUSION: Primary | ICD-10-CM

## 2021-08-17 DIAGNOSIS — M41.50 DEGENERATIVE SCOLIOSIS IN ADULT PATIENT: ICD-10-CM

## 2021-08-17 DIAGNOSIS — G89.29 CHRONIC LEFT-SIDED LOW BACK PAIN WITHOUT SCIATICA: ICD-10-CM

## 2021-08-17 PROCEDURE — 97112 NEUROMUSCULAR REEDUCATION: CPT | Mod: GP | Performed by: PHYSICAL THERAPY ASSISTANT

## 2021-08-17 PROCEDURE — 97110 THERAPEUTIC EXERCISES: CPT | Mod: GP | Performed by: PHYSICAL THERAPY ASSISTANT

## 2021-08-18 ENCOUNTER — OFFICE VISIT (OUTPATIENT)
Dept: FAMILY MEDICINE | Facility: CLINIC | Age: 66
End: 2021-08-18
Payer: COMMERCIAL

## 2021-08-18 VITALS
BODY MASS INDEX: 40.49 KG/M2 | RESPIRATION RATE: 16 BRPM | SYSTOLIC BLOOD PRESSURE: 132 MMHG | HEART RATE: 91 BPM | OXYGEN SATURATION: 98 % | DIASTOLIC BLOOD PRESSURE: 78 MMHG | HEIGHT: 67 IN | TEMPERATURE: 97.1 F | WEIGHT: 258 LBS

## 2021-08-18 DIAGNOSIS — Z12.31 ENCOUNTER FOR SCREENING MAMMOGRAM FOR BREAST CANCER: ICD-10-CM

## 2021-08-18 DIAGNOSIS — E66.01 MORBID OBESITY (H): ICD-10-CM

## 2021-08-18 DIAGNOSIS — Z00.00 ROUTINE GENERAL MEDICAL EXAMINATION AT A HEALTH CARE FACILITY: Primary | ICD-10-CM

## 2021-08-18 DIAGNOSIS — D64.9 POSTOPERATIVE ANEMIA: ICD-10-CM

## 2021-08-18 DIAGNOSIS — I10 HYPERTENSION GOAL BP (BLOOD PRESSURE) < 140/90: ICD-10-CM

## 2021-08-18 LAB
BASOPHILS # BLD AUTO: 0 10E3/UL (ref 0–0.2)
BASOPHILS NFR BLD AUTO: 1 %
EOSINOPHIL # BLD AUTO: 0.1 10E3/UL (ref 0–0.7)
EOSINOPHIL NFR BLD AUTO: 1 %
ERYTHROCYTE [DISTWIDTH] IN BLOOD BY AUTOMATED COUNT: 14.4 % (ref 10–15)
HCT VFR BLD AUTO: 36.9 % (ref 35–47)
HGB BLD-MCNC: 11.4 G/DL (ref 11.7–15.7)
IMM GRANULOCYTES # BLD: 0 10E3/UL
IMM GRANULOCYTES NFR BLD: 0 %
LYMPHOCYTES # BLD AUTO: 1.5 10E3/UL (ref 0.8–5.3)
LYMPHOCYTES NFR BLD AUTO: 28 %
MCH RBC QN AUTO: 25.6 PG (ref 26.5–33)
MCHC RBC AUTO-ENTMCNC: 30.9 G/DL (ref 31.5–36.5)
MCV RBC AUTO: 83 FL (ref 78–100)
MONOCYTES # BLD AUTO: 0.4 10E3/UL (ref 0–1.3)
MONOCYTES NFR BLD AUTO: 7 %
NEUTROPHILS # BLD AUTO: 3.3 10E3/UL (ref 1.6–8.3)
NEUTROPHILS NFR BLD AUTO: 63 %
PLATELET # BLD AUTO: 250 10E3/UL (ref 150–450)
RBC # BLD AUTO: 4.45 10E6/UL (ref 3.8–5.2)
WBC # BLD AUTO: 5.2 10E3/UL (ref 4–11)

## 2021-08-18 PROCEDURE — 82947 ASSAY GLUCOSE BLOOD QUANT: CPT | Performed by: FAMILY MEDICINE

## 2021-08-18 PROCEDURE — 36415 COLL VENOUS BLD VENIPUNCTURE: CPT | Performed by: FAMILY MEDICINE

## 2021-08-18 PROCEDURE — 99396 PREV VISIT EST AGE 40-64: CPT | Performed by: FAMILY MEDICINE

## 2021-08-18 PROCEDURE — 99213 OFFICE O/P EST LOW 20 MIN: CPT | Mod: 25 | Performed by: FAMILY MEDICINE

## 2021-08-18 PROCEDURE — 85025 COMPLETE CBC W/AUTO DIFF WBC: CPT | Performed by: FAMILY MEDICINE

## 2021-08-18 RX ORDER — LOSARTAN POTASSIUM 50 MG/1
50 TABLET ORAL DAILY
Qty: 90 TABLET | Refills: 3 | Status: SHIPPED | OUTPATIENT
Start: 2021-08-18 | End: 2022-09-26

## 2021-08-18 ASSESSMENT — MIFFLIN-ST. JEOR: SCORE: 1752.91

## 2021-08-18 NOTE — RESULT ENCOUNTER NOTE
Ms. Murray,    -Normal (almost) red blood cell (hgb) levels, normal white blood cell count and normal platelet levels.  This is MUCH better than it was right after surgery Madyson.    If you have further questions about the interpretation of your labs, [email or web address] is a good website to check out for further information.    Please contact the clinic if you have additional questions.  Thank you.    Sincerely,    Ga Ken MD

## 2021-08-18 NOTE — PROGRESS NOTES
SUBJECTIVE:   CC: Madyson Murray is an 66 year old woman who presents for preventive health visit.       Patient has been advised of split billing requirements and indicates understanding: Yes  Healthy Habits:    Getting at least 3 servings of Calcium per day:  Yes    Bi-annual eye exam:  Yes    Dental care twice a year:  NO (only once a year)    Sleep apnea or symptoms of sleep apnea:  Sleep apnea    Diet:  Other (Keto Diet)    Frequency of exercise:  6-7 days/week    Duration of exercise:  45-60 minutes    Taking medications regularly:  No    Barriers to taking medications:  Not applicable    Medication side effects:  Not applicable    PHQ-2 Total Score:    Additional concerns today:  No         Hypertension Follow-up      Do you check your blood pressure regularly outside of the clinic? Yes     Are you following a low salt diet? Yes    Are your blood pressures ever more than 140 on the top number (systolic) OR more   than 90 on the bottom number (diastolic), for example 140/90? Yes      Today's PHQ-2 Score:   PHQ-2 ( 1999 [employer]) 5/21/2021   Q1: Little interest or pleasure in doing things 0   Q2: Feeling down, depressed or hopeless 0   PHQ-2 Score 0       Abuse: Current or Past (Physical, Sexual or Emotional) - No  Do you feel safe in your environment? Yes    Have you ever done Advance Care Planning? (For example, a Health Directive, POLST, or a discussion with a medical provider or your loved ones about your wishes): No, advance care planning information given to patient to review.  Patient declined advance care planning discussion at this time.    Social History     Tobacco Use     Smoking status: Former Smoker     Packs/day: 1.00     Years: 15.00     Pack years: 15.00     Types: Cigarettes     Smokeless tobacco: Former User     Quit date: 8/17/1988     Tobacco comment: quit around age 32   Substance Use Topics     Alcohol use: Yes     Comment: 2-4 drinks couple times per week     If you drink alcohol do  "you typically have >3 drinks per day or >7 drinks per week? No    Alcohol Use 8/18/2021   Prescreen: >3 drinks/day or >7 drinks/week? No       Reviewed orders with patient.  Reviewed health maintenance and updated orders accordingly - Yes      Breast Cancer Screening:  Any new diagnosis of family breast, ovarian, or bowel cancer? No    FHS-7: No flowsheet data found.    Mammogram Screening: Recommended mammography every 1-2 years with patient discussion and risk factor consideration  Pertinent mammograms are reviewed under the imaging tab.    History of abnormal Pap smear: NO - age 30-65 PAP every 5 years with negative HPV co-testing recommended  PAP / HPV Latest Ref Rng & Units 3/5/2018   PAP (Historical) - NIL   HPV16 NEG:Negative Negative   HPV18 NEG:Negative Negative   HRHPV NEG:Negative Negative     Reviewed and updated as needed this visit by clinical staff  Tobacco  Allergies  Meds  Problems             Reviewed and updated as needed this visit by Provider  Tobacco    Problems                Review of Systems  CONSTITUTIONAL: NEGATIVE for fever, chills, change in weight  INTEGUMENTARY/SKIN: NEGATIVE for worrisome rashes, moles or lesions  EYES: NEGATIVE for vision changes or irritation  ENT: NEGATIVE for ear, mouth and throat problems  RESP: NEGATIVE for significant cough or SOB  BREAST: NEGATIVE for masses, tenderness or discharge  CV: NEGATIVE for chest pain, palpitations or peripheral edema  GI: NEGATIVE for nausea, abdominal pain, heartburn, or change in bowel habits  : NEGATIVE for unusual urinary or vaginal symptoms. No vaginal bleeding.  MUSCULOSKELETAL: NEGATIVE for significant arthralgias or myalgia  NEURO: NEGATIVE for weakness, dizziness or paresthesias  PSYCHIATRIC: NEGATIVE for changes in mood or affect      OBJECTIVE:   /78   Pulse 91   Temp 97.1  F (36.2  C)   Resp 16   Ht 1.702 m (5' 7\")   Wt 117 kg (258 lb)   SpO2 98%   BMI 40.41 kg/m    Physical Exam  GENERAL APPEARANCE: " healthy, alert and no distress  EYES: Eyes grossly normal to inspection, PERRL and conjunctivae and sclerae normal  HENT: ear canals and TM's normal, nose and mouth without ulcers or lesions, oropharynx clear and oral mucous membranes moist  NECK: no adenopathy, no asymmetry, masses, or scars and thyroid normal to palpation  RESP: lungs clear to auscultation - no rales, rhonchi or wheezes  CV: regular rate and rhythm, normal S1 S2, no S3 or S4, no murmur, click or rub, no peripheral edema and peripheral pulses strong  ABDOMEN: soft, nontender, no hepatosplenomegaly, no masses and bowel sounds normal  MS: no musculoskeletal defects are noted and gait is age appropriate without ataxia  SKIN: no suspicious lesions or rashes  NEURO: Normal strength and tone, sensory exam grossly normal, mentation intact and speech normal  PSYCH: mentation appears normal and affect normal/bright    Diagnostic Test Results:  Labs reviewed in Epic  Results for orders placed or performed in visit on 08/18/21   GLUCOSE     Status: None   Result Value Ref Range    Glucose 75 70 - 99 mg/dL    Patient Fasting > 8hrs? Yes    CBC with platelets and differential     Status: Abnormal    Narrative    The following orders were created for panel order CBC with platelets and differential.  Procedure                               Abnormality         Status                     ---------                               -----------         ------                     CBC with platelets and d...[[id number]]  Abnormal            Final result                 Please view results for these tests on the individual orders.   CBC with platelets and differential     Status: Abnormal   Result Value Ref Range    WBC Count 5.2 4.0 - 11.0 10e3/uL    RBC Count 4.45 3.80 - 5.20 10e6/uL    Hemoglobin 11.4 (L) 11.7 - 15.7 g/dL    Hematocrit 36.9 35.0 - 47.0 %    MCV 83 78 - 100 fL    MCH 25.6 (L) 26.5 - 33.0 pg    MCHC 30.9 (L) 31.5 - 36.5 g/dL    RDW 14.4 10.0 - 15.0 %     "Platelet Count 250 150 - 450 10e3/uL    % Neutrophils 63 %    % Lymphocytes 28 %    % Monocytes 7 %    % Eosinophils 1 %    % Basophils 1 %    % Immature Granulocytes 0 %    Absolute Neutrophils 3.3 1.6 - 8.3 10e3/uL    Absolute Lymphocytes 1.5 0.8 - 5.3 10e3/uL    Absolute Monocytes 0.4 0.0 - 1.3 10e3/uL    Absolute Eosinophils 0.1 0.0 - 0.7 10e3/uL    Absolute Basophils 0.0 0.0 - 0.2 10e3/uL    Absolute Immature Granulocytes 0.0 <=0.0 10e3/uL       ASSESSMENT/PLAN:   1. Routine general medical examination at a health care facility  up to date with preventative care measures.  Emphasized importance of healthy lifestyle including regular exercise and lower fat & lower sodium diet - praised for 30 lb weight loss.    2. Hypertension goal BP (blood pressure) < 140/90  Blood pressure at goal.  Reinforced how her weight loss has contributed to improved blood pressure control.  - losartan (COZAAR) 50 MG tablet; Take 1 tablet (50 mg) by mouth daily  Dispense: 90 tablet; Refill: 3    3. Encounter for screening mammogram for breast cancer  Due for this.  - *MA Screening Digital Bilateral; Future    4. Postoperative anemia  Hgb has risen appropriately post-operatively.  No need to recheck.  - CBC with platelets and differential; Future  - CBC with platelets and differential    5. Morbid obesity (H)  Praised for weight loss and encouraged continued weight loss.  - GLUCOSE; Future  - GLUCOSE    Patient has been advised of split billing requirements and indicates understanding: Yes  COUNSELING:  Reviewed preventive health counseling, as reflected in patient instructions       Healthy diet/nutrition    Estimated body mass index is 40.41 kg/m  as calculated from the following:    Height as of this encounter: 1.702 m (5' 7\").    Weight as of this encounter: 117 kg (258 lb).    Weight management plan: Discussed healthy diet and exercise guidelines    She reports that she has quit smoking. Her smoking use included cigarettes. She has " a 15.00 pack-year smoking history. She quit smokeless tobacco use about 33 years ago.      Counseling Resources:  ATP IV Guidelines  Pooled Cohorts Equation Calculator  Breast Cancer Risk Calculator  BRCA-Related Cancer Risk Assessment: FHS-7 Tool  FRAX Risk Assessment  ICSI Preventive Guidelines  Dietary Guidelines for Americans, 2010  USDA's MyPlate  ASA Prophylaxis  Lung CA Screening    Ga Ken Jr, MD  [facility]

## 2021-08-19 LAB
FASTING STATUS PATIENT QL REPORTED: YES
GLUCOSE BLD-MCNC: 75 MG/DL (ref 70–99)

## 2021-08-19 NOTE — RESULT ENCOUNTER NOTE
Ms. Murray,    -Glucose (diabetic screening test) is normal.    If you have further questions about the interpretation of your labs, [email or web address] is a good website to check out for further information.    Please contact the clinic if you have additional questions.  Thank you.    Sincerely,    Ga Ken MD

## 2021-08-20 ENCOUNTER — ANCILLARY PROCEDURE (OUTPATIENT)
Dept: MAMMOGRAPHY | Facility: CLINIC | Age: 66
End: 2021-08-20
Attending: FAMILY MEDICINE
Payer: COMMERCIAL

## 2021-08-20 DIAGNOSIS — Z12.31 ENCOUNTER FOR SCREENING MAMMOGRAM FOR BREAST CANCER: ICD-10-CM

## 2021-08-20 PROCEDURE — 77067 SCR MAMMO BI INCL CAD: CPT | Mod: TC | Performed by: RADIOLOGY

## 2021-09-14 ENCOUNTER — THERAPY VISIT (OUTPATIENT)
Dept: PHYSICAL THERAPY | Facility: CLINIC | Age: 66
End: 2021-09-14
Payer: COMMERCIAL

## 2021-09-14 DIAGNOSIS — M41.50 DEGENERATIVE SCOLIOSIS IN ADULT PATIENT: ICD-10-CM

## 2021-09-14 DIAGNOSIS — Z98.1 S/P SPINAL FUSION: Primary | ICD-10-CM

## 2021-09-14 DIAGNOSIS — M54.50 CHRONIC LEFT-SIDED LOW BACK PAIN WITHOUT SCIATICA: ICD-10-CM

## 2021-09-14 DIAGNOSIS — M54.16 LUMBAR RADICULOPATHY: ICD-10-CM

## 2021-09-14 DIAGNOSIS — G89.29 CHRONIC LEFT-SIDED LOW BACK PAIN WITHOUT SCIATICA: ICD-10-CM

## 2021-09-14 DIAGNOSIS — M99.43 CONNECTIVE TISSUE STENOSIS OF NEURAL CANAL OF LUMBAR REGION: ICD-10-CM

## 2021-09-14 PROCEDURE — 97112 NEUROMUSCULAR REEDUCATION: CPT | Mod: GP | Performed by: PHYSICAL THERAPY ASSISTANT

## 2021-09-14 PROCEDURE — 97110 THERAPEUTIC EXERCISES: CPT | Mod: GP | Performed by: PHYSICAL THERAPY ASSISTANT

## 2021-09-28 ENCOUNTER — THERAPY VISIT (OUTPATIENT)
Dept: PHYSICAL THERAPY | Facility: CLINIC | Age: 66
End: 2021-09-28
Payer: COMMERCIAL

## 2021-09-28 DIAGNOSIS — Z98.1 S/P SPINAL FUSION: Primary | ICD-10-CM

## 2021-09-28 DIAGNOSIS — M54.16 LUMBAR RADICULOPATHY: ICD-10-CM

## 2021-09-28 DIAGNOSIS — M41.50 DEGENERATIVE SCOLIOSIS IN ADULT PATIENT: ICD-10-CM

## 2021-09-28 PROCEDURE — 97110 THERAPEUTIC EXERCISES: CPT | Mod: GP | Performed by: PHYSICAL THERAPY ASSISTANT

## 2021-09-28 PROCEDURE — 97530 THERAPEUTIC ACTIVITIES: CPT | Mod: GP | Performed by: PHYSICAL THERAPY ASSISTANT

## 2021-10-01 DIAGNOSIS — M51.369 DEGENERATIVE DISC DISEASE, LUMBAR: Primary | ICD-10-CM

## 2021-10-10 ENCOUNTER — HEALTH MAINTENANCE LETTER (OUTPATIENT)
Age: 66
End: 2021-10-10

## 2021-10-12 ENCOUNTER — ANCILLARY PROCEDURE (OUTPATIENT)
Dept: GENERAL RADIOLOGY | Facility: CLINIC | Age: 66
End: 2021-10-12
Attending: ORTHOPAEDIC SURGERY
Payer: COMMERCIAL

## 2021-10-12 ENCOUNTER — OFFICE VISIT (OUTPATIENT)
Dept: ORTHOPEDICS | Facility: CLINIC | Age: 66
End: 2021-10-12
Payer: COMMERCIAL

## 2021-10-12 VITALS — WEIGHT: 258 LBS | HEIGHT: 67 IN | BODY MASS INDEX: 40.49 KG/M2

## 2021-10-12 DIAGNOSIS — M54.16 LUMBAR RADICULOPATHY: ICD-10-CM

## 2021-10-12 DIAGNOSIS — M41.50 DEGENERATIVE SCOLIOSIS IN ADULT PATIENT: Primary | ICD-10-CM

## 2021-10-12 DIAGNOSIS — Z98.1 S/P SPINAL FUSION: ICD-10-CM

## 2021-10-12 DIAGNOSIS — R07.9 CHEST PAIN RADIATING TO JAW: ICD-10-CM

## 2021-10-12 DIAGNOSIS — M54.14 THORACIC RADICULOPATHY: ICD-10-CM

## 2021-10-12 PROCEDURE — 77073 BONE LENGTH STUDIES: CPT | Performed by: STUDENT IN AN ORGANIZED HEALTH CARE EDUCATION/TRAINING PROGRAM

## 2021-10-12 PROCEDURE — 99214 OFFICE O/P EST MOD 30 MIN: CPT | Mod: GC | Performed by: ORTHOPAEDIC SURGERY

## 2021-10-12 PROCEDURE — 72082 X-RAY EXAM ENTIRE SPI 2/3 VW: CPT | Performed by: STUDENT IN AN ORGANIZED HEALTH CARE EDUCATION/TRAINING PROGRAM

## 2021-10-12 ASSESSMENT — MIFFLIN-ST. JEOR: SCORE: 1752.91

## 2021-10-12 NOTE — LETTER
Return to Work  2021     Seen today: yes    Patient:  Madyson Murray  :   1955  MRN:     [id number]  Physician: ROSA CERVANTES    Madyson Murray may return to work on Date: 10/13/21      The next clinic appointment is scheduled for 2022    Patient limitations: Patient can lift up to 50lbs at the waist but patient should only lift up to 30lbs off of the ground. Try to avoid repetitive or aggressive bending or twisting motion at the waist.        Electronically signed by Rosa Cervantes MD

## 2021-10-12 NOTE — NURSING NOTE
"Reason For Visit:   Chief Complaint   Patient presents with     RECHECK     3 month follow up T11 to pelvis fusion DOS 6/2/21       Primary MD: Ga Ken Jr.  Ref. MD: Jesus Manuel     ?  No  Date of surgery: Dr. León   Type of surgery: Dr. León .  Smoker: No  Request smoking cessation information: No    Ht 1.702 m (5' 7\")   Wt 117 kg (258 lb)   BMI 40.41 kg/m           Oswestry (MARYSOL) Questionnaire    OSWESTRY DISABILITY INDEX 6/29/2021   Count 4   Sum 11   Oswestry Score (%) 55            Neck Disability Index (NDI) Questionnaire    No flowsheet data found.                Promis 10 Assessment    PROMIS 10 4/9/2021   In general, would you say your health is: Fair   In general, would you say your quality of life is: Poor   In general, how would you rate your physical health? Poor   In general, how would you rate your mental health, including your mood and your ability to think? Fair   In general, how would you rate your satisfaction with your social activities and relationships? Poor   In general, please rate how well you carry out your usual social activities and roles Poor   To what extent are you able to carry out your everyday physical activities such as walking, climbing stairs, carrying groceries, or moving a chair? A little   How often have you been bothered by emotional problems such as feeling anxious, depressed or irritable? Often   How would you rate your fatigue on average? Moderate   How would you rate your pain on average?   0 = No Pain  to  10 = Worst Imaginable Pain 8   In general, would you say your health is: 2   In general, would you say your quality of life is: 1   In general, how would you rate your physical health? 1   In general, how would you rate your mental health, including your mood and your ability to think? 2   In general, how would you rate your satisfaction with your social activities and relationships? 1   In general, please rate how well you carry out your " usual social activities and roles. (This includes activities at home, at work and in your community, and responsibilities as a parent, child, spouse, employee, friend, etc.) 1   To what extent are you able to carry out your everyday physical activities such as walking, climbing stairs, carrying groceries, or moving a chair? 2   In the past 7 days, how often have you been bothered by emotional problems such as feeling anxious, depressed, or irritable? 4   In the past 7 days, how would you rate your fatigue on average? 3   In the past 7 days, how would you rate your pain on average, where 0 means no pain, and 10 means worst imaginable pain? 8   Global Mental Health Score 6   Global Physical Health Score 8   PROMIS TOTAL - SUBSCORES 14   Some recent data might be hidden                Marcos Burnham ATC

## 2021-10-12 NOTE — LETTER
10/12/2021         RE: Madyson Murray  [address]  [place] [address]        Dear Colleague,    Thank you for referring your patient, Madyson Murray, to the [facility] [place]. Please see a copy of my visit note below.    Spine Surgery Return Clinic Visit      Chief Complaint:   No chief complaint on file.      Interval HPI:  Symptom Profile Including: location of symptoms, onset, severity, exacerbating/alleviating factors, previous treatments:        Madyson Murray is a 66 year old female who presents for 3 month follow-up s/p T11 to pelvis fusion on 6/2/21 with Dr. León. At 6 week follow-up she reported new right leg numbness and weakness since undergoing surgery, today she reports her numbness and weakness have improved, with only some minor residual pins and needle sensation over the outside and posterior aspect of her leg and thigh (L5-S1 distribution).  She reports her strength has improved with initiation of physical therapy.  She would like to return to work at [employer], she states she has to lift anywhere from 30 to 50 pounds, and is wondering if she might be able to return.            Past Medical History:     Past Medical History:   Diagnosis Date     Chronic bilateral low back pain without sciatica      Degenerative scoliosis      Lumbar radiculopathy      Obesity      DAE (obstructive sleep apnea)      Pulmonary hypertension (H)     last echo normal     Tricuspid regurgitation      Venous (peripheral) insufficiency             Past Surgical History:     Past Surgical History:   Procedure Laterality Date     JOINT REPLACEMENT Right      JOINT REPLACEMENT Left      OPTICAL TRACKING SYSTEM FUSION SPINE POSTERIOR LUMBAR THREE+ LEVELS N/A 6/2/2021    Procedure: Thoracic 11 to Pelvis posterior instrumented fusion with Lumbar 2 to 4 and 5 to 1  interbody fusion with lee stoddard osteotomy 4-5 and 3 column osteotomy 2-3 and 5-1 with O-Arm/Stealth Navigation,  use of allograft, local autograft, bone morphogenic protein,;  Surgeon: Gab León MD;  Location: [facility] OR     ORTHOPEDIC SURGERY      2010 and 2012, total knees     TUBAL LIGATION              Social History:     Social History     Tobacco Use     Smoking status: Former Smoker     Packs/day: 1.00     Years: 15.00     Pack years: 15.00     Types: Cigarettes     Smokeless tobacco: Former User     Quit date: 8/17/1988     Tobacco comment: quit around age 32   Substance Use Topics     Alcohol use: Yes     Comment: 2-4 drinks couple times per week            Family History:     Family History   Problem Relation Age of Onset     Hypertension Mother      Diabetes Father      Hypertension Father      Parkinsonism Father      Diabetes Sister      Hypertension Sister      Sleep Apnea Sister      Sleep Apnea Brother             Allergies:     Allergies   Allergen Reactions     Adhesive [Liquid Adhesive]             Medications:     Current Outpatient Medications   Medication     losartan (COZAAR) 50 MG tablet     No current facility-administered medications for this visit.             Review of Systems:   A focused musculoskeletal and neurologic ROS was performed with pertinent positives and negatives noted in the HPI.  Additional systems were also reviewed and are documented at the bottom of the note.         Physical Exam:   Vitals: There were no vitals taken for this visit.  Musculoskeletal, Neurologic, and Spine:    Lumbar Spine:    Appearance - No gross stepoffs or deformities    Motor -     L2-3: Hip flexion 4/5 R and 5/5 L strength          L3/4:  Knee extension R 5/5 and L 5/5 strength         L4/5:  Foot dorsiflexion R 5/5 L 5/5 and       EHL dorsiflexion R 4/5 L 4/5 strength         S1:  Plantarflexion/Peroneal Muscles  R 5/5 and L 5/5 strength    Sensation: intact to light touch L3-S1 distribution BLE, dysthesia over L5-S1 distribution      Alignment:  Patient stands with a neutral standing sagittal  balance.           Imaging:   We ordered and independently reviewed new radiographs at this clinic visit. The results were discussed with the patient.     10/1/2021 XR spine was reviewed and compared to prior imaging 7/13/2021 XR spine, imaging reviewed stable hardware without signs of loosening or failure, appropriate sagittal balance.       Assessment and Plan:     66 year old female with who returns to clinic for postsurgical evaluation following T11 to pelvis posterior instrumented fusion with L2-L4 and L5-S1 interbody fusion with SPO as well as 3 column osteotomies.   Patient is doing well postoperatively with improvement in weakness and numbness noted at prior visit and presents today with questions about restrictions and the possibility of return to work.  Restrictions were discussed with her and include no lifting greater than 50 lbs at the waist and no bending over at the waist to lift greater than 30 lbs off the floor.       Mike Ludwig MD   Orthopaedic Surgery Resident   Pager [phone]    Respectfully,  Gab León MD  Spine Surgery  [facility]    Attending MD (Dr. Gab León) :  I reviewed and verified the history and physical exam of the patient and discussed the patient's management with the other clinical providers involved in this patient's care including any involved residents or physicians assistants. I reviewed the above note and agree with the documented findings and plan of care, which were communicated to the patient.      Gab León MD

## 2021-10-12 NOTE — PROGRESS NOTES
Spine Surgery Return Clinic Visit      Chief Complaint:   No chief complaint on file.      Interval HPI:  Symptom Profile Including: location of symptoms, onset, severity, exacerbating/alleviating factors, previous treatments:        Madyson Murray is a 66 year old female who presents for 3 month follow-up s/p T11 to pelvis fusion on 6/2/21 with Dr. León. At 6 week follow-up she reported new right leg numbness and weakness since undergoing surgery, today she reports her numbness and weakness have improved, with only some minor residual pins and needle sensation over the outside and posterior aspect of her leg and thigh (L5-S1 distribution).  She reports her strength has improved with initiation of physical therapy.  She would like to return to work at [employer], she states she has to lift anywhere from 30 to 50 pounds, and is wondering if she might be able to return.            Past Medical History:     Past Medical History:   Diagnosis Date     Chronic bilateral low back pain without sciatica      Degenerative scoliosis      Lumbar radiculopathy      Obesity      DAE (obstructive sleep apnea)      Pulmonary hypertension (H)     last echo normal     Tricuspid regurgitation      Venous (peripheral) insufficiency             Past Surgical History:     Past Surgical History:   Procedure Laterality Date     JOINT REPLACEMENT Right      JOINT REPLACEMENT Left      OPTICAL TRACKING SYSTEM FUSION SPINE POSTERIOR LUMBAR THREE+ LEVELS N/A 6/2/2021    Procedure: Thoracic 11 to Pelvis posterior instrumented fusion with Lumbar 2 to 4 and 5 to 1  interbody fusion with lee stoddard osteotomy 4-5 and 3 column osteotomy 2-3 and 5-1 with O-Arm/Stealth Navigation, use of allograft, local autograft, bone morphogenic protein,;  Surgeon: Gab León MD;  Location: [facility] OR     ORTHOPEDIC SURGERY      2010 and 2012, total knees     TUBAL LIGATION              Social History:     Social History     Tobacco Use     Smoking  status: Former Smoker     Packs/day: 1.00     Years: 15.00     Pack years: 15.00     Types: Cigarettes     Smokeless tobacco: Former User     Quit date: 8/17/1988     Tobacco comment: quit around age 32   Substance Use Topics     Alcohol use: Yes     Comment: 2-4 drinks couple times per week            Family History:     Family History   Problem Relation Age of Onset     Hypertension Mother      Diabetes Father      Hypertension Father      Parkinsonism Father      Diabetes Sister      Hypertension Sister      Sleep Apnea Sister      Sleep Apnea Brother             Allergies:     Allergies   Allergen Reactions     Adhesive [Liquid Adhesive]             Medications:     Current Outpatient Medications   Medication     losartan (COZAAR) 50 MG tablet     No current facility-administered medications for this visit.             Review of Systems:   A focused musculoskeletal and neurologic ROS was performed with pertinent positives and negatives noted in the HPI.  Additional systems were also reviewed and are documented at the bottom of the note.         Physical Exam:   Vitals: There were no vitals taken for this visit.  Musculoskeletal, Neurologic, and Spine:    Lumbar Spine:    Appearance - No gross stepoffs or deformities    Motor -     L2-3: Hip flexion 4/5 R and 5/5 L strength          L3/4:  Knee extension R 5/5 and L 5/5 strength         L4/5:  Foot dorsiflexion R 5/5 L 5/5 and       EHL dorsiflexion R 4/5 L 4/5 strength         S1:  Plantarflexion/Peroneal Muscles  R 5/5 and L 5/5 strength    Sensation: intact to light touch L3-S1 distribution BLE, dysthesia over L5-S1 distribution      Alignment:  Patient stands with a neutral standing sagittal balance.           Imaging:   We ordered and independently reviewed new radiographs at this clinic visit. The results were discussed with the patient.     10/1/2021 XR spine was reviewed and compared to prior imaging 7/13/2021 XR spine, imaging reviewed stable hardware  without signs of loosening or failure, appropriate sagittal balance.       Assessment and Plan:     66 year old female with who returns to clinic for postsurgical evaluation following T11 to pelvis posterior instrumented fusion with L2-L4 and L5-S1 interbody fusion with SPO as well as 3 column osteotomies.   Patient is doing well postoperatively with improvement in weakness and numbness noted at prior visit and presents today with questions about restrictions and the possibility of return to work.  Restrictions were discussed with her and include no lifting greater than 50 lbs at the waist and no bending over at the waist to lift greater than 30 lbs off the floor.       Mike Ludwig MD   Orthopaedic Surgery Resident   Pager [phone]    Respectfully,  Gab León MD  Spine Surgery  [facility]    Attending MD (Dr. Gab León) :  I reviewed and verified the history and physical exam of the patient and discussed the patient's management with the other clinical providers involved in this patient's care including any involved residents or physicians assistants. I reviewed the above note and agree with the documented findings and plan of care, which were communicated to the patient.      Gab León MD

## 2021-12-09 ENCOUNTER — IMMUNIZATION (OUTPATIENT)
Dept: NURSING | Facility: CLINIC | Age: 66
End: 2021-12-09
Payer: COMMERCIAL

## 2021-12-09 PROCEDURE — 0064A COVID-19,PF,MODERNA (18+ YRS BOOSTER .25ML): CPT

## 2021-12-09 PROCEDURE — 91306 COVID-19,PF,MODERNA (18+ YRS BOOSTER .25ML): CPT

## 2022-03-08 ENCOUNTER — ANCILLARY PROCEDURE (OUTPATIENT)
Dept: CT IMAGING | Facility: CLINIC | Age: 67
End: 2022-03-08
Attending: ORTHOPAEDIC SURGERY
Payer: COMMERCIAL

## 2022-03-08 DIAGNOSIS — M54.16 LUMBAR RADICULOPATHY: ICD-10-CM

## 2022-03-08 DIAGNOSIS — M41.50 DEGENERATIVE SCOLIOSIS IN ADULT PATIENT: ICD-10-CM

## 2022-03-08 DIAGNOSIS — M54.14 THORACIC RADICULOPATHY: ICD-10-CM

## 2022-03-08 DIAGNOSIS — Z98.1 S/P SPINAL FUSION: ICD-10-CM

## 2022-03-08 PROCEDURE — 72131 CT LUMBAR SPINE W/O DYE: CPT | Mod: GC | Performed by: RADIOLOGY

## 2022-03-08 PROCEDURE — 72128 CT CHEST SPINE W/O DYE: CPT | Mod: GC | Performed by: RADIOLOGY

## 2022-03-14 ENCOUNTER — VIRTUAL VISIT (OUTPATIENT)
Dept: ORTHOPEDICS | Facility: CLINIC | Age: 67
End: 2022-03-14
Payer: COMMERCIAL

## 2022-03-14 DIAGNOSIS — M54.16 LUMBAR RADICULOPATHY: Primary | ICD-10-CM

## 2022-03-14 PROCEDURE — 99212 OFFICE O/P EST SF 10 MIN: CPT | Mod: TEL | Performed by: ORTHOPAEDIC SURGERY

## 2022-03-14 NOTE — PROGRESS NOTES
Madyson is a 66 year old who is being evaluated via a billable telephone visit.      What phone number would you like to be contacted at? Home  How would you like to obtain your AVS? Sonia  Phone call duration: 13 minutes    Diagnosis: Status post lumbar fusion surgery    I called Madyson to review her recent CT scan.  She states the surgery has helped her and she has less back pain than she did before.  She still struggles occasionally with balance problems.  She is working on this and has been through therapy.  She has exercises to do on her own.  I reviewed her CT scan which seems to show a solid fusion.    I told her to let me know if symptoms worsen in the future and I had be happy to talk with her at any time.  I would feel need to get updated spine imaging unless there were new symptoms.  With regards to the balance problems we discussed observation versus referral to neurology versus updated MRIs of the cervical and thoracic spine, and at this time she and I elected to continue observation.  She will let me know if anything changes or which she wishes to pursue further work-up.    Gab León MD

## 2022-03-14 NOTE — LETTER
3/14/2022         RE: Madyson Murray  [address]  [place] [address]        Dear Colleague,    Thank you for referring your patient, Madyson Murray, to the [facility] [place]. Please see a copy of my visit note below.    Madyson is a 66 year old who is being evaluated via a billable telephone visit.      What phone number would you like to be contacted at? Home  How would you like to obtain your AVS? MyChart  Phone call duration: 13 minutes    Diagnosis: Status post lumbar fusion surgery    I called Madyson to review her recent CT scan.  She states the surgery has helped her and she has less back pain than she did before.  She still struggles occasionally with balance problems.  She is working on this and has been through therapy.  She has exercises to do on her own.  I reviewed her CT scan which seems to show a solid fusion.    I told her to let me know if symptoms worsen in the future and I had be happy to talk with her at any time.  I would feel need to get updated spine imaging unless there were new symptoms.  With regards to the balance problems we discussed observation versus referral to neurology versus updated MRIs of the cervical and thoracic spine, and at this time she and I elected to continue observation.  She will let me know if anything changes or which she wishes to pursue further work-up.    Gab León MD

## 2022-06-07 DIAGNOSIS — G47.33 OBSTRUCTIVE SLEEP APNEA (ADULT) (PEDIATRIC): Primary | ICD-10-CM

## 2022-09-17 ENCOUNTER — HEALTH MAINTENANCE LETTER (OUTPATIENT)
Age: 67
End: 2022-09-17

## 2022-09-26 DIAGNOSIS — I10 HYPERTENSION GOAL BP (BLOOD PRESSURE) < 140/90: ICD-10-CM

## 2022-09-26 RX ORDER — LOSARTAN POTASSIUM 50 MG/1
50 TABLET ORAL DAILY
Qty: 90 TABLET | Refills: 3 | Status: SHIPPED | OUTPATIENT
Start: 2022-09-26

## 2022-09-26 NOTE — TELEPHONE ENCOUNTER
Patient calls, she has moved to [place]. She is trying to get appointment with new provider in [place]. However, they need to get her records before they can see her and told her to allow 2 weeks for them to be received. She will be out of medication before then and asks if Dr. Ken can provide a 30 day supply of her Losartan while she waits. Please notify patient if prescription cannot be filled.    Routing refill request to provider for review/approval because:  Patient has moved, requesting madeleine refill to [facility].    Nicolasa Crawford RN  [facility]

## 2023-01-28 ENCOUNTER — HEALTH MAINTENANCE LETTER (OUTPATIENT)
Age: 68
End: 2023-01-28

## 2023-12-17 ENCOUNTER — HEALTH MAINTENANCE LETTER (OUTPATIENT)
Age: 68
End: 2023-12-17

## 2024-02-25 ENCOUNTER — HEALTH MAINTENANCE LETTER (OUTPATIENT)
Age: 69
End: 2024-02-25

## 2025-03-09 ENCOUNTER — HEALTH MAINTENANCE LETTER (OUTPATIENT)
Age: 70
End: 2025-03-09

## (undated) DEVICE — SPONGE COTTONOID 1X1" 80-1403

## (undated) DEVICE — SU VICRYL 2-0 CT-2 8X18" UND D8144

## (undated) DEVICE — NDL COUNTER 20CT 31142493

## (undated) DEVICE — SPONGE COTTONOID 1X3" 80-1408

## (undated) DEVICE — BRUSH SURGICAL SCRUB W/4% CHLORHEXIDINE GLUCONATE SOL 4458A

## (undated) DEVICE — GOWN XLG DISP 9545

## (undated) DEVICE — POSITIONER ARMBOARD FOAM 1PAIR LF FP-ARMB1

## (undated) DEVICE — ESU ELEC BLADE 6" COATED/INSULATED E1455-6

## (undated) DEVICE — LINEN TOWEL PACK X30 5481

## (undated) DEVICE — PACK SPINE INSTRUMENT DRAPE 76091-ACM7820

## (undated) DEVICE — LINEN GOWN X4 5410

## (undated) DEVICE — DRAPE O ARM TUBE 9732722

## (undated) DEVICE — SU MONOCRYL 3-0 PS-2 18" UND Y497G

## (undated) DEVICE — LINEN BACK PACK 5440

## (undated) DEVICE — DRSG TEGADERM 4X10" 1627

## (undated) DEVICE — DRSG TEGADERM 4X4 3/4" 1626W

## (undated) DEVICE — SPONGE COTTONOID NEURO 1/2"X1/2" 30-054

## (undated) DEVICE — DRAPE POUCH INSTRUMENT 1018

## (undated) DEVICE — GLOVE PROTEXIS MICRO 7.0  2D73PM70

## (undated) DEVICE — DRAIN JACKSON PRATT ROUND W/TROCAR 15FR LF JP-HUR151

## (undated) DEVICE — GLOVE PROTEXIS BLUE W/NEU-THERA 7.5  2D73EB75

## (undated) DEVICE — SU PROLENE 0 CT-1 30" 8424H

## (undated) DEVICE — NDL COUNTER 40CT  31142311

## (undated) DEVICE — MARKER SPHERES PASSIVE MEDT PACK 5 8801075

## (undated) DEVICE — SPECIMEN CONTAINER 5OZ STERILE 2600SA

## (undated) DEVICE — ESU ELEC BLADE 2.75" COATED/INSULATED E1455

## (undated) DEVICE — STPL SKIN 35W ROTATING HEAD PRW35

## (undated) DEVICE — CATH TRAY FOLEY SURESTEP 16FR WDRAIN BAG STLK LATEX A300316A

## (undated) DEVICE — GLOVE PROTEXIS W/NEU-THERA 7.5  2D73TE75

## (undated) DEVICE — SU PROLENE 0 CTX 30" 8454H

## (undated) DEVICE — DRAIN JACKSON PRATT RESERVOIR 100ML SU130-1305

## (undated) DEVICE — BONE WAX 2.5GM W31G

## (undated) DEVICE — SU PROLENE 6-0 BV-1 DA 24" 8805H

## (undated) DEVICE — DRSG GAUZE 4X8" NON21842

## (undated) DEVICE — SPONGE SURGIFOAM 100 1974

## (undated) DEVICE — BASIN SET MAJOR

## (undated) DEVICE — ESU PENCIL W/HOLSTER E2350H

## (undated) DEVICE — DRSG STERI STRIP 1/2X4" R1547

## (undated) DEVICE — DRSG KERLIX FLUFFS X5

## (undated) DEVICE — SUCTION MINISQUAIR SMOKE EVAC CAPTURE DEVICE SQ20012-01

## (undated) DEVICE — SU VICRYL 1 CT-1 36" J347H

## (undated) DEVICE — Device

## (undated) DEVICE — BLADE BONE MILL STRK 5.0MM MED 5400-701-000

## (undated) DEVICE — SOL WATER IRRIG 1000ML BOTTLE 2F7114

## (undated) DEVICE — SU ETHILON 3-0 PS-1 18" 1663H

## (undated) DEVICE — SU VICRYL 1 CT-1 CR 8X18" J741D

## (undated) DEVICE — DRAIN ROUND W/RESERV KIT JACKSON PRATT 10FR 400ML SU130-402D

## (undated) DEVICE — DRSG DRAIN 2X2" 7087

## (undated) DEVICE — IOM SUPPLIES/CASE FEE

## (undated) DEVICE — ESU GROUND PAD UNIVERSAL W/O CORD

## (undated) DEVICE — KIT PATIENT CARE PROAXIS SYSTEM 6988-PV-ACP

## (undated) DEVICE — IMP SCR SET MEDT SOLERA BREAK OFF 5.5MM TI 5540030: Type: IMPLANTABLE DEVICE | Site: SPINE LUMBAR | Status: NON-FUNCTIONAL

## (undated) DEVICE — IMP SPACER MEDT CAPSTONE CONTROL 14X22MM 18DEG 4021422: Type: IMPLANTABLE DEVICE | Site: SPINE LUMBAR | Status: NON-FUNCTIONAL

## (undated) DEVICE — SPONGE KITTNER 31001010

## (undated) DEVICE — GOWN IMPERVIOUS SPECIALTY XLG/XLONG 32474

## (undated) DEVICE — SOL ISOPROPYL RUBBING ALCOHOL USP 70% 4OZ HDX-20 I0020

## (undated) DEVICE — ESU CORD BIPOLAR GREEN 10-4000

## (undated) DEVICE — SOL NACL 0.9% IRRIG 1000ML BOTTLE 2F7124

## (undated) DEVICE — GLOVE PROTEXIS BLUE W/NEU-THERA 8.0  2D73EB80

## (undated) DEVICE — WIPES FOLEY CARE SURESTEP PROVON DFC100

## (undated) DEVICE — CELL SAVER

## (undated) DEVICE — SYR 03ML LL W/O NDL 309657

## (undated) DEVICE — PREP CHLORAPREP 26ML TINTED HI-LITE ORANGE 930815

## (undated) DEVICE — TOOL DISSECT MIDAS MR8 14CM MATCH HEAD 3MM MR8-14MH30

## (undated) RX ORDER — EPHEDRINE SULFATE 50 MG/ML
INJECTION, SOLUTION INTRAMUSCULAR; INTRAVENOUS; SUBCUTANEOUS
Status: DISPENSED
Start: 2021-06-02

## (undated) RX ORDER — FENTANYL CITRATE 50 UG/ML
INJECTION, SOLUTION INTRAMUSCULAR; INTRAVENOUS
Status: DISPENSED
Start: 2021-06-02

## (undated) RX ORDER — ACETAMINOPHEN 325 MG/1
TABLET ORAL
Status: DISPENSED
Start: 2021-06-02

## (undated) RX ORDER — VANCOMYCIN HYDROCHLORIDE 1 G/20ML
INJECTION, POWDER, LYOPHILIZED, FOR SOLUTION INTRAVENOUS
Status: DISPENSED
Start: 2021-06-02

## (undated) RX ORDER — GABAPENTIN 300 MG/1
CAPSULE ORAL
Status: DISPENSED
Start: 2021-06-02

## (undated) RX ORDER — DEXAMETHASONE SODIUM PHOSPHATE 4 MG/ML
INJECTION, SOLUTION INTRA-ARTICULAR; INTRALESIONAL; INTRAMUSCULAR; INTRAVENOUS; SOFT TISSUE
Status: DISPENSED
Start: 2021-06-02

## (undated) RX ORDER — LIDOCAINE HYDROCHLORIDE 20 MG/ML
INJECTION, SOLUTION EPIDURAL; INFILTRATION; INTRACAUDAL; PERINEURAL
Status: DISPENSED
Start: 2021-06-02

## (undated) RX ORDER — CEFAZOLIN SODIUM 1 G/3ML
INJECTION, POWDER, FOR SOLUTION INTRAMUSCULAR; INTRAVENOUS
Status: DISPENSED
Start: 2021-06-02

## (undated) RX ORDER — FENTANYL CITRATE-0.9 % NACL/PF 10 MCG/ML
PLASTIC BAG, INJECTION (ML) INTRAVENOUS
Status: DISPENSED
Start: 2021-06-02

## (undated) RX ORDER — HYDROMORPHONE HYDROCHLORIDE 1 MG/ML
INJECTION, SOLUTION INTRAMUSCULAR; INTRAVENOUS; SUBCUTANEOUS
Status: DISPENSED
Start: 2021-06-02

## (undated) RX ORDER — PROPOFOL 10 MG/ML
INJECTION, EMULSION INTRAVENOUS
Status: DISPENSED
Start: 2021-06-02

## (undated) RX ORDER — DEXAMETHASONE SODIUM PHOSPHATE 10 MG/ML
INJECTION, SOLUTION INTRAMUSCULAR; INTRAVENOUS
Status: DISPENSED
Start: 2019-10-30

## (undated) RX ORDER — CEFAZOLIN SODIUM 2 G/100ML
INJECTION, SOLUTION INTRAVENOUS
Status: DISPENSED
Start: 2021-06-02

## (undated) RX ORDER — TRIAMCINOLONE ACETONIDE 40 MG/ML
INJECTION, SUSPENSION INTRA-ARTICULAR; INTRAMUSCULAR
Status: DISPENSED
Start: 2019-10-30

## (undated) RX ORDER — BUPIVACAINE HYDROCHLORIDE 2.5 MG/ML
INJECTION, SOLUTION EPIDURAL; INFILTRATION; INTRACAUDAL
Status: DISPENSED
Start: 2019-10-30

## (undated) RX ORDER — ONDANSETRON 2 MG/ML
INJECTION INTRAMUSCULAR; INTRAVENOUS
Status: DISPENSED
Start: 2021-06-02

## (undated) RX ORDER — GLYCOPYRROLATE 0.2 MG/ML
INJECTION INTRAMUSCULAR; INTRAVENOUS
Status: DISPENSED
Start: 2021-06-02

## (undated) RX ORDER — OXYCODONE HYDROCHLORIDE 5 MG/1
TABLET ORAL
Status: DISPENSED
Start: 2021-06-02

## (undated) RX ORDER — NEOSTIGMINE METHYLSULFATE 1 MG/ML
VIAL (ML) INJECTION
Status: DISPENSED
Start: 2021-06-02

## (undated) RX ORDER — CALCIUM CHLORIDE 100 MG/ML
INJECTION INTRAVENOUS; INTRAVENTRICULAR
Status: DISPENSED
Start: 2021-06-02

## (undated) RX ORDER — LIDOCAINE HYDROCHLORIDE 10 MG/ML
INJECTION, SOLUTION EPIDURAL; INFILTRATION; INTRACAUDAL; PERINEURAL
Status: DISPENSED
Start: 2019-10-30